# Patient Record
Sex: MALE | Race: WHITE | NOT HISPANIC OR LATINO | Employment: FULL TIME | URBAN - METROPOLITAN AREA
[De-identification: names, ages, dates, MRNs, and addresses within clinical notes are randomized per-mention and may not be internally consistent; named-entity substitution may affect disease eponyms.]

---

## 2019-01-07 ENCOUNTER — HOSPITAL ENCOUNTER (OUTPATIENT)
Dept: URGENT CARE | Facility: CLINIC | Age: 69
Discharge: HOME | End: 2019-01-07
Attending: FAMILY MEDICINE | Admitting: FAMILY MEDICINE

## 2019-09-01 ENCOUNTER — HOSPITAL ENCOUNTER (EMERGENCY)
Facility: HOSPITAL | Age: 69
Discharge: HOME | End: 2019-09-01
Admitting: EMERGENCY MEDICINE

## 2019-09-01 ENCOUNTER — APPOINTMENT (OUTPATIENT)
Dept: CT IMAGING | Facility: HOSPITAL | Age: 69
End: 2019-09-01

## 2019-09-01 VITALS
OXYGEN SATURATION: 98 % | HEIGHT: 75 IN | TEMPERATURE: 97.9 F | BODY MASS INDEX: 29.22 KG/M2 | WEIGHT: 235 LBS | HEART RATE: 70 BPM | DIASTOLIC BLOOD PRESSURE: 82 MMHG | SYSTOLIC BLOOD PRESSURE: 152 MMHG | RESPIRATION RATE: 18 BRPM

## 2019-09-01 DIAGNOSIS — K40.90 UNILATERAL INGUINAL HERNIA WITHOUT OBSTRUCTION OR GANGRENE, RECURRENCE NOT SPECIFIED: Primary | ICD-10-CM

## 2019-09-01 LAB
ALBUMIN SERPL-MCNC: 4.2 G/DL (ref 3.5–4.8)
ALBUMIN/GLOB SERPL: 1.6 G/DL (ref 1–1.7)
ALP SERPL-CCNC: 64 U/L (ref 32–91)
ALT SERPL W P-5'-P-CCNC: 19 U/L (ref 17–63)
ANION GAP SERPL CALCULATED.3IONS-SCNC: 15.8 MMOL/L (ref 5–15)
AST SERPL-CCNC: 23 U/L (ref 15–41)
BASOPHILS # BLD AUTO: 0.1 10*3/MM3 (ref 0–0.2)
BASOPHILS NFR BLD AUTO: 1.3 % (ref 0–1.5)
BILIRUB SERPL-MCNC: 0.9 MG/DL (ref 0.3–1.2)
BILIRUB UR QL STRIP: NEGATIVE
BUN BLD-MCNC: 12 MG/DL (ref 8–20)
BUN/CREAT SERPL: 12 (ref 6.2–20.3)
CALCIUM SPEC-SCNC: 8.8 MG/DL (ref 8.9–10.3)
CHLORIDE SERPL-SCNC: 103 MMOL/L (ref 101–111)
CLARITY UR: CLEAR
CO2 SERPL-SCNC: 23 MMOL/L (ref 22–32)
COLOR UR: YELLOW
CREAT BLD-MCNC: 1 MG/DL (ref 0.7–1.2)
DEPRECATED RDW RBC AUTO: 45.1 FL (ref 37–54)
EOSINOPHIL # BLD AUTO: 0.1 10*3/MM3 (ref 0–0.4)
EOSINOPHIL NFR BLD AUTO: 2.1 % (ref 0.3–6.2)
ERYTHROCYTE [DISTWIDTH] IN BLOOD BY AUTOMATED COUNT: 14.1 % (ref 12.3–15.4)
GFR SERPL CREATININE-BSD FRML MDRD: 74 ML/MIN/1.73
GLOBULIN UR ELPH-MCNC: 2.6 GM/DL (ref 2.5–3.8)
GLUCOSE BLD-MCNC: 106 MG/DL (ref 65–99)
GLUCOSE UR STRIP-MCNC: NEGATIVE MG/DL
HCT VFR BLD AUTO: 44.4 % (ref 37.5–51)
HGB BLD-MCNC: 15.3 G/DL (ref 13–17.7)
HGB UR QL STRIP.AUTO: NEGATIVE
HOLD SPECIMEN: NORMAL
HOLD SPECIMEN: NORMAL
KETONES UR QL STRIP: NEGATIVE
LEUKOCYTE ESTERASE UR QL STRIP.AUTO: NEGATIVE
LIPASE SERPL-CCNC: 31 U/L (ref 22–51)
LYMPHOCYTES # BLD AUTO: 1.5 10*3/MM3 (ref 0.7–3.1)
LYMPHOCYTES NFR BLD AUTO: 22.1 % (ref 19.6–45.3)
MCH RBC QN AUTO: 31.1 PG (ref 26.6–33)
MCHC RBC AUTO-ENTMCNC: 34.4 G/DL (ref 31.5–35.7)
MCV RBC AUTO: 90.6 FL (ref 79–97)
MONOCYTES # BLD AUTO: 0.5 10*3/MM3 (ref 0.1–0.9)
MONOCYTES NFR BLD AUTO: 7.6 % (ref 5–12)
NEUTROPHILS # BLD AUTO: 4.5 10*3/MM3 (ref 1.7–7)
NEUTROPHILS NFR BLD AUTO: 66.9 % (ref 42.7–76)
NITRITE UR QL STRIP: NEGATIVE
NRBC BLD AUTO-RTO: 0.1 /100 WBC (ref 0–0.2)
PH UR STRIP.AUTO: 7 [PH] (ref 5–8)
PLATELET # BLD AUTO: 194 10*3/MM3 (ref 140–450)
PMV BLD AUTO: 7.6 FL (ref 6–12)
POTASSIUM BLD-SCNC: 3.8 MMOL/L (ref 3.6–5.1)
PROT SERPL-MCNC: 6.8 G/DL (ref 6.1–7.9)
PROT UR QL STRIP: NEGATIVE
RBC # BLD AUTO: 4.91 10*6/MM3 (ref 4.14–5.8)
SODIUM BLD-SCNC: 138 MMOL/L (ref 136–144)
SP GR UR STRIP: 1.03 (ref 1–1.03)
UROBILINOGEN UR QL STRIP: NORMAL
WBC NRBC COR # BLD: 6.7 10*3/MM3 (ref 3.4–10.8)
WHOLE BLOOD HOLD SPECIMEN: NORMAL
WHOLE BLOOD HOLD SPECIMEN: NORMAL

## 2019-09-01 PROCEDURE — 85025 COMPLETE CBC W/AUTO DIFF WBC: CPT | Performed by: NURSE PRACTITIONER

## 2019-09-01 PROCEDURE — 74177 CT ABD & PELVIS W/CONTRAST: CPT

## 2019-09-01 PROCEDURE — 83690 ASSAY OF LIPASE: CPT | Performed by: NURSE PRACTITIONER

## 2019-09-01 PROCEDURE — 81003 URINALYSIS AUTO W/O SCOPE: CPT | Performed by: NURSE PRACTITIONER

## 2019-09-01 PROCEDURE — 80053 COMPREHEN METABOLIC PANEL: CPT | Performed by: NURSE PRACTITIONER

## 2019-09-01 PROCEDURE — 0 IOPAMIDOL PER 1 ML: Performed by: NURSE PRACTITIONER

## 2019-09-01 PROCEDURE — 99283 EMERGENCY DEPT VISIT LOW MDM: CPT

## 2019-09-01 RX ORDER — SODIUM CHLORIDE 0.9 % (FLUSH) 0.9 %
10 SYRINGE (ML) INJECTION AS NEEDED
Status: DISCONTINUED | OUTPATIENT
Start: 2019-09-01 | End: 2019-09-01 | Stop reason: HOSPADM

## 2019-09-01 RX ADMIN — IOPAMIDOL 100 ML: 755 INJECTION, SOLUTION INTRAVENOUS at 15:38

## 2019-09-01 NOTE — ED PROVIDER NOTES
"Keanu   Is a 69-year-old male that states he woke and noticed a \"bulge\" in his right lower quadrant.  He states it is not painful.  He states that he called Dr. Curtis's office and was sent here for evaluation.  He has no pain no nausea no vomiting no fever no chills.            Review of Systems   Constitutional: Negative for chills, fatigue and fever.   HENT: Negative for congestion, tinnitus and trouble swallowing.    Eyes: Negative for photophobia, discharge and redness.   Respiratory: Negative for cough and shortness of breath.    Cardiovascular: Negative for chest pain and palpitations.   Gastrointestinal: Negative for abdominal pain, diarrhea, nausea and vomiting.        Right lower quadrant abdominal wall bulge   Genitourinary: Negative for dysuria, frequency and urgency.   Musculoskeletal: Negative for back pain, joint swelling and myalgias.   Skin: Negative for rash.   Neurological: Negative for dizziness and headaches.   Psychiatric/Behavioral: Negative for confusion.   All other systems reviewed and are negative.      Past Medical History:   Diagnosis Date   • Vertigo        Allergies   Allergen Reactions   • Codeine Paresthesia       Past Surgical History:   Procedure Laterality Date   • ABDOMINAL SURGERY     • COLON SURGERY     • HERNIA REPAIR         History reviewed. No pertinent family history.    Social History     Socioeconomic History   • Marital status:      Spouse name: Not on file   • Number of children: Not on file   • Years of education: Not on file   • Highest education level: Not on file   Tobacco Use   • Smoking status: Former Smoker     Last attempt to quit: 2009     Years since quitting: 10.6   • Smokeless tobacco: Never Used   Substance and Sexual Activity   • Alcohol use: No     Frequency: Never   • Drug use: No           Objective   Physical Exam   Constitutional: He is oriented to person, place, and time. He appears well-developed and well-nourished.   HENT:   Head: " Normocephalic and atraumatic.   Eyes: Conjunctivae and EOM are normal. Pupils are equal, round, and reactive to light.   Neck: Normal range of motion. Neck supple.   Cardiovascular: Normal rate, regular rhythm, normal heart sounds and intact distal pulses.   Pulmonary/Chest: Effort normal and breath sounds normal.   Abdominal: Soft. Bowel sounds are normal.       Musculoskeletal: Normal range of motion.   Neurological: He is alert and oriented to person, place, and time. GCS eye subscore is 4. GCS verbal subscore is 5. GCS motor subscore is 6.   Skin: Skin is warm and dry.   Psychiatric: He has a normal mood and affect.   Vitals reviewed.      Procedures           ED Course  ED Course as of Sep 01 1622   Sun Sep 01, 2019   1619 Dr. Perry happened to be in the emergency room and the case was discussed with her and she reviewed the CAT scan and the patient will be discharged home to follow-up in the office  [KW]      ED Course User Index  [KW] Hattie Ramirez, MALENA      Labs Reviewed   COMPREHENSIVE METABOLIC PANEL - Abnormal; Notable for the following components:       Result Value    Glucose 106 (*)     Calcium 8.8 (*)     Anion Gap 15.8 (*)     All other components within normal limits    Narrative:     The MDRD GFR formula is only valid for adults with stable renal function between ages 18 and 70.   LIPASE - Normal   URINALYSIS W/ MICROSCOPIC IF INDICATED (NO CULTURE) - Normal    Narrative:     Urine microscopic not indicated.   CBC WITH AUTO DIFFERENTIAL - Normal   RAINBOW DRAW    Narrative:     The following orders were created for panel order [place] Draw.  Procedure                               Abnormality         Status                     ---------                               -----------         ------                     Light Blue Top[[id number]]                                   Final result               Green Top (Gel)[[id number]]                                  Final result                Lavender Top[[id number]]                                     Final result               Gold Top - SST[[id number]]                                   Final result                 Please view results for these tests on the individual orders.   CBC AND DIFFERENTIAL    Narrative:     The following orders were created for panel order CBC & Differential.  Procedure                               Abnormality         Status                     ---------                               -----------         ------                     CBC Auto Differential[[id number]]        Normal              Final result                 Please view results for these tests on the individual orders.   LIGHT BLUE TOP   GREEN TOP   LAVENDER TOP   GOLD TOP - SST     Medications   sodium chloride 0.9 % flush 10 mL (not administered)   iopamidol (ISOVUE-370) 76 % injection 100 mL (100 mL Intravenous Given 9/1/19 1538)     Ct Abdomen Pelvis With Contrast    Result Date: 9/1/2019  1. 1. Patient reports bulging in the right side today. CT shows no corresponding finding. 2. There is a small fat-containing left inguinal hernia. 3. There is a fluid-filled loop of bowel that measures 4 cm in the right pelvis, which appears to be a distended segment of ileum. There are surgical sutures around this, in the appearance may be due to prior surgery. There are also surgical sutures around the sigmoid colon. 4. Small adrenal nodules are statistically most likely adenomas. 5. Incidental 4findings as reported above.  Electronically Signed By-Yusra Del Angel On:9/1/2019 4:10 PM This report was finalized on [id number] by  Yusra Del Angel .                [facility]      Final diagnoses:   Unilateral inguinal hernia without obstruction or gangrene, recurrence not specified            Hattie Ramirez, APRN  09/01/19 [id number]

## 2019-09-01 NOTE — DISCHARGE INSTRUCTIONS
Follow-up with Dr. Perry in the office for further evaluation treatment    Return for increased pain nausea vomiting fever chills or worsening symptoms

## 2019-11-01 ENCOUNTER — TRANSCRIBE ORDERS (OUTPATIENT)
Dept: ADMINISTRATIVE | Facility: HOSPITAL | Age: 69
End: 2019-11-01

## 2019-11-01 DIAGNOSIS — R42 DIZZINESS AND GIDDINESS: Primary | ICD-10-CM

## 2019-11-06 ENCOUNTER — HOSPITAL ENCOUNTER (OUTPATIENT)
Dept: RESPIRATORY THERAPY | Facility: HOSPITAL | Age: 69
Discharge: HOME | End: 2019-11-06
Admitting: INTERNAL MEDICINE

## 2019-11-06 DIAGNOSIS — R42 DIZZINESS AND GIDDINESS: ICD-10-CM

## 2019-11-06 LAB
BH CV STRESS BP STAGE 1: NORMAL
BH CV STRESS BP STAGE 3: NORMAL
BH CV STRESS DURATION MIN STAGE 1: 3
BH CV STRESS DURATION MIN STAGE 2: 2
BH CV STRESS DURATION MIN STAGE 3: 2
BH CV STRESS DURATION SEC STAGE 1: 0
BH CV STRESS DURATION SEC STAGE 2: 0
BH CV STRESS DURATION SEC STAGE 3: 0
BH CV STRESS GRADE STAGE 1: 10
BH CV STRESS GRADE STAGE 2: 12
BH CV STRESS GRADE STAGE 3: 14
BH CV STRESS HR STAGE 1: 127
BH CV STRESS HR STAGE 2: 139
BH CV STRESS HR STAGE 3: 162
BH CV STRESS METS STAGE 1: 5
BH CV STRESS METS STAGE 2: 7.5
BH CV STRESS METS STAGE 3: 10
BH CV STRESS PROTOCOL 1: NORMAL
BH CV STRESS RECOVERY BP: NORMAL MMHG
BH CV STRESS RECOVERY HR: 112 BPM
BH CV STRESS SPEED STAGE 1: 1.7
BH CV STRESS SPEED STAGE 2: 2.5
BH CV STRESS SPEED STAGE 3: 3.4
BH CV STRESS STAGE 1: 1
BH CV STRESS STAGE 2: 2
BH CV STRESS STAGE 3: 3
MAXIMAL PREDICTED HEART RATE: 149 BPM
PERCENT MAX PREDICTED HR: 108.72 %
STRESS BASELINE BP: NORMAL MMHG
STRESS BASELINE HR: 106 BPM
STRESS PERCENT HR: 128 %
STRESS POST ESTIMATED WORKLOAD: 10.1 METS
STRESS POST EXERCISE DUR MIN: 7 MIN
STRESS POST EXERCISE DUR SEC: 0 SEC
STRESS POST PEAK BP: NORMAL MMHG
STRESS POST PEAK HR: 162 BPM
STRESS TARGET HR: 127 BPM

## 2019-11-06 PROCEDURE — 93017 CV STRESS TEST TRACING ONLY: CPT

## 2019-11-06 PROCEDURE — 93016 CV STRESS TEST SUPVJ ONLY: CPT | Performed by: INTERNAL MEDICINE

## 2019-11-06 PROCEDURE — 93018 CV STRESS TEST I&R ONLY: CPT | Performed by: INTERNAL MEDICINE

## 2020-03-05 ENCOUNTER — APPOINTMENT (OUTPATIENT)
Dept: GENERAL RADIOLOGY | Facility: HOSPITAL | Age: 70
End: 2020-03-05

## 2020-03-05 ENCOUNTER — HOSPITAL ENCOUNTER (OUTPATIENT)
Facility: HOSPITAL | Age: 70
Setting detail: OBSERVATION
Discharge: HOME | End: 2020-03-06
Attending: EMERGENCY MEDICINE | Admitting: HOSPITALIST

## 2020-03-05 DIAGNOSIS — I48.91 ATRIAL FIBRILLATION WITH RAPID VENTRICULAR RESPONSE (HCC): Primary | ICD-10-CM

## 2020-03-05 PROBLEM — E03.9 HYPOTHYROIDISM (ACQUIRED): Status: ACTIVE | Noted: 2020-03-05

## 2020-03-05 PROBLEM — I10 ESSENTIAL HYPERTENSION: Chronic | Status: ACTIVE | Noted: 2020-03-05

## 2020-03-05 LAB
ANION GAP SERPL CALCULATED.3IONS-SCNC: 16 MMOL/L (ref 5–15)
BASOPHILS # BLD AUTO: 0.1 10*3/MM3 (ref 0–0.2)
BASOPHILS NFR BLD AUTO: 0.8 % (ref 0–1.5)
BUN BLD-MCNC: 11 MG/DL (ref 8–23)
BUN/CREAT SERPL: 11.5 (ref 7–25)
CALCIUM SPEC-SCNC: 9.6 MG/DL (ref 8.6–10.5)
CHLORIDE SERPL-SCNC: 105 MMOL/L (ref 98–107)
CO2 SERPL-SCNC: 22 MMOL/L (ref 22–29)
CREAT BLD-MCNC: 0.96 MG/DL (ref 0.76–1.27)
DEPRECATED RDW RBC AUTO: 42.4 FL (ref 37–54)
EOSINOPHIL # BLD AUTO: 0.1 10*3/MM3 (ref 0–0.4)
EOSINOPHIL NFR BLD AUTO: 1.1 % (ref 0.3–6.2)
ERYTHROCYTE [DISTWIDTH] IN BLOOD BY AUTOMATED COUNT: 13.5 % (ref 12.3–15.4)
GFR SERPL CREATININE-BSD FRML MDRD: 77 ML/MIN/1.73
GLUCOSE BLD-MCNC: 151 MG/DL (ref 65–99)
GLUCOSE BLDC GLUCOMTR-MCNC: 113 MG/DL (ref 70–105)
HCT VFR BLD AUTO: 51.2 % (ref 37.5–51)
HGB BLD-MCNC: 17.6 G/DL (ref 13–17.7)
HOLD SPECIMEN: NORMAL
HOLD SPECIMEN: NORMAL
LYMPHOCYTES # BLD AUTO: 1.5 10*3/MM3 (ref 0.7–3.1)
LYMPHOCYTES NFR BLD AUTO: 16.8 % (ref 19.6–45.3)
MCH RBC QN AUTO: 31.2 PG (ref 26.6–33)
MCHC RBC AUTO-ENTMCNC: 34.3 G/DL (ref 31.5–35.7)
MCV RBC AUTO: 90.9 FL (ref 79–97)
MONOCYTES # BLD AUTO: 0.7 10*3/MM3 (ref 0.1–0.9)
MONOCYTES NFR BLD AUTO: 7.4 % (ref 5–12)
NEUTROPHILS # BLD AUTO: 6.7 10*3/MM3 (ref 1.7–7)
NEUTROPHILS NFR BLD AUTO: 73.9 % (ref 42.7–76)
NRBC BLD AUTO-RTO: 0.1 /100 WBC (ref 0–0.2)
PLATELET # BLD AUTO: 246 10*3/MM3 (ref 140–450)
PMV BLD AUTO: 8.5 FL (ref 6–12)
POTASSIUM BLD-SCNC: 3.6 MMOL/L (ref 3.5–5.2)
RBC # BLD AUTO: 5.63 10*6/MM3 (ref 4.14–5.8)
SODIUM BLD-SCNC: 143 MMOL/L (ref 136–145)
T4 FREE SERPL-MCNC: 1.31 NG/DL (ref 0.93–1.7)
TROPONIN T SERPL-MCNC: <0.01 NG/ML (ref 0–0.03)
TSH SERPL DL<=0.05 MIU/L-ACNC: 4.67 UIU/ML (ref 0.27–4.2)
WBC NRBC COR # BLD: 9.1 10*3/MM3 (ref 3.4–10.8)
WHOLE BLOOD HOLD SPECIMEN: NORMAL
WHOLE BLOOD HOLD SPECIMEN: NORMAL

## 2020-03-05 PROCEDURE — 93005 ELECTROCARDIOGRAM TRACING: CPT

## 2020-03-05 PROCEDURE — 84439 ASSAY OF FREE THYROXINE: CPT | Performed by: EMERGENCY MEDICINE

## 2020-03-05 PROCEDURE — 96365 THER/PROPH/DIAG IV INF INIT: CPT

## 2020-03-05 PROCEDURE — 99285 EMERGENCY DEPT VISIT HI MDM: CPT

## 2020-03-05 PROCEDURE — 25010000002 ENOXAPARIN PER 10 MG: Performed by: EMERGENCY MEDICINE

## 2020-03-05 PROCEDURE — 99219 PR INITIAL OBSERVATION CARE/DAY 50 MINUTES: CPT | Performed by: PHYSICIAN ASSISTANT

## 2020-03-05 PROCEDURE — 93005 ELECTROCARDIOGRAM TRACING: CPT | Performed by: EMERGENCY MEDICINE

## 2020-03-05 PROCEDURE — G0378 HOSPITAL OBSERVATION PER HR: HCPCS

## 2020-03-05 PROCEDURE — 71045 X-RAY EXAM CHEST 1 VIEW: CPT

## 2020-03-05 PROCEDURE — 96366 THER/PROPH/DIAG IV INF ADDON: CPT

## 2020-03-05 PROCEDURE — 84484 ASSAY OF TROPONIN QUANT: CPT | Performed by: EMERGENCY MEDICINE

## 2020-03-05 PROCEDURE — 85025 COMPLETE CBC W/AUTO DIFF WBC: CPT | Performed by: EMERGENCY MEDICINE

## 2020-03-05 PROCEDURE — 80048 BASIC METABOLIC PNL TOTAL CA: CPT | Performed by: EMERGENCY MEDICINE

## 2020-03-05 PROCEDURE — 96376 TX/PRO/DX INJ SAME DRUG ADON: CPT

## 2020-03-05 PROCEDURE — 84443 ASSAY THYROID STIM HORMONE: CPT | Performed by: EMERGENCY MEDICINE

## 2020-03-05 PROCEDURE — 96372 THER/PROPH/DIAG INJ SC/IM: CPT

## 2020-03-05 PROCEDURE — 82962 GLUCOSE BLOOD TEST: CPT

## 2020-03-05 RX ORDER — ONDANSETRON 4 MG/1
4 TABLET, FILM COATED ORAL EVERY 6 HOURS PRN
Status: DISCONTINUED | OUTPATIENT
Start: 2020-03-05 | End: 2020-03-06 | Stop reason: HOSPADM

## 2020-03-05 RX ORDER — ONDANSETRON 2 MG/ML
4 INJECTION INTRAMUSCULAR; INTRAVENOUS EVERY 6 HOURS PRN
Status: DISCONTINUED | OUTPATIENT
Start: 2020-03-05 | End: 2020-03-06 | Stop reason: HOSPADM

## 2020-03-05 RX ORDER — CALCIUM GLUCONATE 20 MG/ML
2 INJECTION, SOLUTION INTRAVENOUS AS NEEDED
Status: DISCONTINUED | OUTPATIENT
Start: 2020-03-05 | End: 2020-03-06 | Stop reason: HOSPADM

## 2020-03-05 RX ORDER — MAGNESIUM SULFATE HEPTAHYDRATE 40 MG/ML
2 INJECTION, SOLUTION INTRAVENOUS AS NEEDED
Status: DISCONTINUED | OUTPATIENT
Start: 2020-03-05 | End: 2020-03-06 | Stop reason: HOSPADM

## 2020-03-05 RX ORDER — LEVOTHYROXINE SODIUM 0.03 MG/1
25 TABLET ORAL EVERY EVENING
COMMUNITY
End: 2020-11-01 | Stop reason: SDUPTHER

## 2020-03-05 RX ORDER — SODIUM CHLORIDE 0.9 % (FLUSH) 0.9 %
10 SYRINGE (ML) INJECTION EVERY 12 HOURS SCHEDULED
Status: DISCONTINUED | OUTPATIENT
Start: 2020-03-06 | End: 2020-03-06 | Stop reason: HOSPADM

## 2020-03-05 RX ORDER — NITROGLYCERIN 0.4 MG/1
0.4 TABLET SUBLINGUAL
Status: DISCONTINUED | OUTPATIENT
Start: 2020-03-05 | End: 2020-03-06 | Stop reason: HOSPADM

## 2020-03-05 RX ORDER — DOCUSATE SODIUM 100 MG/1
100 CAPSULE, LIQUID FILLED ORAL 2 TIMES DAILY PRN
Status: DISCONTINUED | OUTPATIENT
Start: 2020-03-05 | End: 2020-03-06 | Stop reason: HOSPADM

## 2020-03-05 RX ORDER — SODIUM CHLORIDE 0.9 % (FLUSH) 0.9 %
10 SYRINGE (ML) INJECTION AS NEEDED
Status: DISCONTINUED | OUTPATIENT
Start: 2020-03-05 | End: 2020-03-06 | Stop reason: HOSPADM

## 2020-03-05 RX ORDER — LISINOPRIL 5 MG/1
10 TABLET ORAL EVERY EVENING
Status: DISCONTINUED | OUTPATIENT
Start: 2020-03-06 | End: 2020-03-06

## 2020-03-05 RX ORDER — ACETAMINOPHEN 325 MG/1
650 TABLET ORAL EVERY 4 HOURS PRN
Status: DISCONTINUED | OUTPATIENT
Start: 2020-03-05 | End: 2020-03-06 | Stop reason: HOSPADM

## 2020-03-05 RX ORDER — KETOROLAC TROMETHAMINE 15 MG/ML
15 INJECTION, SOLUTION INTRAMUSCULAR; INTRAVENOUS EVERY 6 HOURS PRN
Status: DISCONTINUED | OUTPATIENT
Start: 2020-03-05 | End: 2020-03-06 | Stop reason: HOSPADM

## 2020-03-05 RX ORDER — CALCIUM GLUCONATE 20 MG/ML
1 INJECTION, SOLUTION INTRAVENOUS AS NEEDED
Status: DISCONTINUED | OUTPATIENT
Start: 2020-03-05 | End: 2020-03-06 | Stop reason: HOSPADM

## 2020-03-05 RX ORDER — POTASSIUM CHLORIDE 20 MEQ/1
40 TABLET, EXTENDED RELEASE ORAL AS NEEDED
Status: DISCONTINUED | OUTPATIENT
Start: 2020-03-05 | End: 2020-03-06 | Stop reason: HOSPADM

## 2020-03-05 RX ORDER — DILTIAZEM HCL IN NACL,ISO-OSM 125 MG/125
5-15 PLASTIC BAG, INJECTION (ML) INTRAVENOUS CONTINUOUS
Status: DISCONTINUED | OUTPATIENT
Start: 2020-03-05 | End: 2020-03-06 | Stop reason: HOSPADM

## 2020-03-05 RX ORDER — POTASSIUM CHLORIDE 1.5 G/1.77G
40 POWDER, FOR SOLUTION ORAL AS NEEDED
Status: DISCONTINUED | OUTPATIENT
Start: 2020-03-05 | End: 2020-03-06 | Stop reason: HOSPADM

## 2020-03-05 RX ORDER — ACETAMINOPHEN 160 MG/5ML
650 SOLUTION ORAL EVERY 4 HOURS PRN
Status: DISCONTINUED | OUTPATIENT
Start: 2020-03-05 | End: 2020-03-06 | Stop reason: HOSPADM

## 2020-03-05 RX ORDER — LEVOTHYROXINE SODIUM 0.03 MG/1
25 TABLET ORAL EVERY EVENING
Status: DISCONTINUED | OUTPATIENT
Start: 2020-03-06 | End: 2020-03-06 | Stop reason: HOSPADM

## 2020-03-05 RX ORDER — CHOLECALCIFEROL (VITAMIN D3) 125 MCG
5 CAPSULE ORAL NIGHTLY PRN
Status: DISCONTINUED | OUTPATIENT
Start: 2020-03-05 | End: 2020-03-06 | Stop reason: HOSPADM

## 2020-03-05 RX ORDER — ACETAMINOPHEN 650 MG/1
650 SUPPOSITORY RECTAL EVERY 4 HOURS PRN
Status: DISCONTINUED | OUTPATIENT
Start: 2020-03-05 | End: 2020-03-06 | Stop reason: HOSPADM

## 2020-03-05 RX ORDER — MAGNESIUM SULFATE HEPTAHYDRATE 40 MG/ML
4 INJECTION, SOLUTION INTRAVENOUS AS NEEDED
Status: DISCONTINUED | OUTPATIENT
Start: 2020-03-05 | End: 2020-03-06 | Stop reason: HOSPADM

## 2020-03-05 RX ORDER — ALUMINA, MAGNESIA, AND SIMETHICONE 2400; 2400; 240 MG/30ML; MG/30ML; MG/30ML
15 SUSPENSION ORAL EVERY 6 HOURS PRN
Status: DISCONTINUED | OUTPATIENT
Start: 2020-03-05 | End: 2020-03-06 | Stop reason: HOSPADM

## 2020-03-05 RX ADMIN — ENOXAPARIN SODIUM 110 MG: 120 INJECTION SUBCUTANEOUS at 18:23

## 2020-03-05 RX ADMIN — SODIUM CHLORIDE 5 MG/HR: 900 INJECTION, SOLUTION INTRAVENOUS at 17:43

## 2020-03-05 RX ADMIN — SODIUM CHLORIDE 5 MG/HR: 900 INJECTION, SOLUTION INTRAVENOUS at 14:44

## 2020-03-05 RX ADMIN — SODIUM CHLORIDE 500 ML: 900 INJECTION, SOLUTION INTRAVENOUS at 17:46

## 2020-03-05 NOTE — H&P
"      [facility] Medicine Services      Patient Name: Filipe Altamirano  : 1950  MRN: [id number]  Primary Care Physician: Frantz Saleem MD  Date of admission: 3/5/2020    Patient Care Team:  Frantz Saleem MD as PCP - General (Internal Medicine)          Subjective   History Present Illness     Chief Complaint:   Chief Complaint   Patient presents with   • Palpitations       Mr. Altamirano is a 70 y.o. male with past medical history of hypertension and hypothyroidism who with past medical history of presents to [facility] complaining of palpitations.  Patient reports that over the past 10 days he has been experiencing symptoms of lightheadedness and \"feeling off\".  He notes that around 1400 hrs. on 3/5/2020 he began experience a sensation of tachycardia and palpitations without any specific pain.  Patient states he was not exerting himself at the time of onset.  He proceeded to the urgent care where he had an EKG which showed A. fib with RVR and was sent to the ED.  Patient denies any shortness of air, nausea, vomiting or recent fever.  He confirms compliance with all of his medications including his thyroid replacement therapy which he started in 2020.    In the ED patient found to have labs significant for a troponin of 0.010, remainder of CMP and CBC generally unremarkable.  TSH: 4.670 with free T4: 1.31.  Chest x-ray shows no acute cardiopulmonary abnormality.  EKG shows A. fib with RVR with no obvious ST changes.  Stress test from 2019 shows patient did report some dyspnea during the test however no evidence of myocardial ischemia is reported by EKG.         History of Present Illness    Review of Systems   Constitution: Negative.   HENT: Negative.    Eyes: Negative.    Cardiovascular: Positive for palpitations.   Respiratory: Negative.    Endocrine: Negative.    Skin: Negative.    Musculoskeletal: Negative.    Gastrointestinal: Negative.    Genitourinary: " Negative.    Neurological: Negative.    Psychiatric/Behavioral: Negative.            Personal History     Past Medical History:   Past Medical History:   Diagnosis Date   • Disease of thyroid gland    • Hyperlipidemia    • Hypertension    • Vertigo        Surgical History:      Past Surgical History:   Procedure Laterality Date   • ABDOMINAL SURGERY     • COLON SURGERY     • HERNIA REPAIR             Family History: family history is not on file. Otherwise pertinent FHx was reviewed and unremarkable.     Social History:  reports that he quit smoking about 11 years ago. He has never used smokeless tobacco. He reports that he does not drink alcohol or use drugs.      Medications:  Prior to Admission medications    Medication Sig Start Date End Date Taking? Authorizing Provider   levothyroxine (SYNTHROID, LEVOTHROID) 25 MCG tablet Take 25 mcg by mouth Every Evening.   Yes Provider, MD Austin   lisinopril (PRINIVIL,ZESTRIL) 10 MG tablet Take 10 mg by mouth Every Evening.   Yes Emergency, Nurse VANDANA Hatfield   levothyroxine (SYNTHROID, LEVOTHROID) 25 MCG tablet levothyroxine 25 mcg tablet   Take 1 tablet every day by oral route.   take at night, the same time, on an empty stomach with a full 8oz glass of water  3/5/20  Emergency, Nurse Liu RN       Allergies:    Allergies   Allergen Reactions   • Codeine Paresthesia       Objective   Objective     Vital Signs  Temp:  [97.8 °F (36.6 °C)-98.4 °F (36.9 °C)] 98.4 °F (36.9 °C)  Heart Rate:  [] 110  Resp:  [18-20] 18  BP: (101-160)/(60-84) 115/60  SpO2:  [93 %-98 %] 97 %  on   ;      Body mass index is 28.87 kg/m².    Physical Exam   Constitutional: He is oriented to person, place, and time. He appears well-developed and well-nourished. No distress.   HENT:   Head: Normocephalic and atraumatic.   Right Ear: External ear normal.   Left Ear: External ear normal.   Nose: Nose normal.   Eyes: Conjunctivae and EOM are normal.   Neck: Normal range of motion. No JVD present.     Cardiovascular: Normal rate, normal heart sounds and intact distal pulses.   Pulmonary/Chest: Effort normal and breath sounds normal.   Abdominal: Soft. Bowel sounds are normal.   Musculoskeletal: Normal range of motion.   Neurological: He is alert and oriented to person, place, and time.   Skin: Skin is warm and dry.   Psychiatric: He has a normal mood and affect. His behavior is normal. Judgment and thought content normal.       Results Review:  I have personally reviewed most recent cardiac tracings, lab results and radiology images and interpretations and agree with findings, most notably: Troponin, TSH, chest x-ray and EKG.    Results from last 7 days   Lab Units 03/05/20  1550   WBC 10*3/mm3 9.10   HEMOGLOBIN g/dL 17.6   HEMATOCRIT % 51.2*   PLATELETS 10*3/mm3 246     Results from last 7 days   Lab Units 03/05/20  1550   SODIUM mmol/L 143   POTASSIUM mmol/L 3.6   CHLORIDE mmol/L 105   CO2 mmol/L 22.0   BUN mg/dL 11   CREATININE mg/dL 0.96   GLUCOSE mg/dL 151*   CALCIUM mg/dL 9.6   TROPONIN T ng/mL <0.010     Estimated Creatinine Clearance: 91.2 mL/min (by C-G formula based on SCr of 0.96 mg/dL).  Brief Urine Lab Results  (Last result in the past 365 days)      Color   Clarity   Blood   Leuk Est   Nitrite   Protein   CREAT   Urine HCG        09/01/19 1458 Yellow Clear Negative Negative Negative Negative               Microbiology Results (last 10 days)     ** No results found for the last 240 hours. **          ECG/EMG Results (most recent)     Procedure Component Value Units Date/Time    ECG 12 Lead [[id number]] Collected:  03/05/20 1531     Updated:  03/05/20 1533    Narrative:       HEART RATE= 142  bpm  RR Interval= 423  ms  NJ Interval=   ms  P Horizontal Axis=   deg  P Front Axis=   deg  QRSD Interval= 100  ms  QT Interval= 319  ms  QRS Axis= 66  deg  T Wave Axis= -50  deg  - ABNORMAL ECG -  Atrial fibrillation  Repolarization abnormality, prob rate related  No previous ECG available for  comparison  Electronically Signed By:   Date and Time of Study: 2020-03-05 15:31:42                    Xr Chest 1 View    Result Date: 3/5/2020  No acute cardiopulmonary abnormality.  Electronically Signed By-Krishan Ritchie On:3/5/2020 5:50 PM This report was finalized on [id number] by  Krishan Ritchie, .        Estimated Creatinine Clearance: 91.2 mL/min (by C-G formula based on SCr of 0.96 mg/dL).    Assessment/Plan   Assessment/Plan       Active Hospital Problems    Diagnosis  POA   • **Atrial fibrillation with rapid ventricular response (CMS/HCC) [I48.91]  Yes     Priority: High   • Essential hypertension [I10]  Yes     Priority: Medium   • Hypothyroidism (acquired) [E03.9]  Yes     Priority: Low      Resolved Hospital Problems   No resolved problems to display.     1.  A. fib with RVR  -EKG shows A. fib with RVR with no obvious ST changes.   -Chest x-ray shows no acute cardiopulmonary changes.  -Cardizem started in the ED with improvement of rate to between , continue  -TSH: 4.670  -Check INR and PT  -Lovenox ordered in the ED pharmacy to dose, continue  -Continue cardiac monitoring  -Cardiology consulted    2.  Hypertension  -Well controlled with a blood pressure on admission of 134/78  - Continue Lisinopril  - Monitor while admittied    3.  Hypothyroidism  -TSH: 4.670, free T4: 1.31  -Continue levothyroxine            VTE Prophylaxis -full dose Lovenox  Mechanical Order History:     None      Pharmalogical Order History:     Ordered     Dose Route Frequency Stop    03/05/20 1800  enoxaparin (LOVENOX) syringe 110 mg      1 mg/kg SC Once 03/05/20 1823          CODE STATUS: Full  Code Status and Medical Interventions:   Ordered at: 03/05/20 1920     Level Of Support Discussed With:    Patient     Code Status:    CPR     Medical Interventions (Level of Support Prior to Arrest):    Full       Admission Status:  I believe this patient meets observation criteria.      I discussed the patient's findings  and my recommendations with patient.        Electronically signed by Emilio Basilio PA-C, 03/05/20, 6:55 PM.  [facility] Hospitalist Team

## 2020-03-05 NOTE — ED NOTES
Pt HR has improved, BP has dropped but within normal limits     Kathi Godfrey, RN  03/05/20 5568

## 2020-03-05 NOTE — ED PROVIDER NOTES
Subjective   History of Present Illness  Palpitations  70-year-old male complains of heart racing palpitations over the last 2 hours.  He states he felt slightly lightheaded off and on over last few days.  He denies chest pain or shortness of breath.  He reports no relieving or exacerbating factors.  States the palpitations came on at rest.  Review of Systems   Constitutional: Negative.    HENT: Negative.    Eyes: Negative.    Respiratory: Negative.    Cardiovascular: Positive for palpitations.   Gastrointestinal: Negative.    Genitourinary: Negative.    Musculoskeletal: Negative.    Skin: Negative.    Neurological: Negative.    Hematological: Negative.    Psychiatric/Behavioral: Negative.        Past Medical History:   Diagnosis Date   • Disease of thyroid gland    • Hyperlipidemia    • Hypertension    • Vertigo        Allergies   Allergen Reactions   • Codeine Paresthesia       Past Surgical History:   Procedure Laterality Date   • ABDOMINAL SURGERY     • COLON SURGERY     • HERNIA REPAIR         No family history on file.    Social History     Socioeconomic History   • Marital status:      Spouse name: Not on file   • Number of children: Not on file   • Years of education: Not on file   • Highest education level: Not on file   Tobacco Use   • Smoking status: Former Smoker     Last attempt to quit:      Years since quittin.1   • Smokeless tobacco: Never Used   Substance and Sexual Activity   • Alcohol use: No     Frequency: Never   • Drug use: No     Prior to Admission medications    Medication Sig Start Date End Date Taking? Authorizing Provider   levothyroxine (SYNTHROID, LEVOTHROID) 25 MCG tablet levothyroxine 25 mcg tablet   Take 1 tablet every day by oral route.   take at night, the same time, on an empty stomach with a full 8oz glass of water    Emergency, Nurse Liu, RN   lisinopril (PRINIVIL,ZESTRIL) 10 MG tablet lisinopril 10 mg tablet   TAKE ONE TABLET BY MOUTH DAILY    Emergency, Nurse  "Epic, RN     /69   Pulse 112   Temp 98.4 °F (36.9 °C) (Oral)   Resp 18   Ht 190.5 cm (75\")   Wt 105 kg (231 lb)   SpO2 93%   BMI 28.87 kg/m²       Objective   Physical Exam  General: Well-developed well-appearing, no acute distress, alert and appropriate  Eyes: Pupils round , sclera nonicteric  HEENT: Mucous membranes moist, no mucosal swelling  Neck: Supple, no nuchal rigidity, no lymphadenopathy  Respirations: Respirations nonlabored, equal breath sounds bilaterally, clear lungs  Heart rapid rate, irregular rhythm, no murmurs rubs or gallops,   Abdomen soft nontender nondistended, no hepatosplenomegaly, no hernia,   Extremities no clubbing cyanosis or edema, calves are symmetric and nontender  Neuro cranial nerves grossly intact, no focal limb deficits  Psych oriented, pleasant affect  Skin no rash, brisk cap refill  Procedures           ED Course      My EKG interpretation, atrial fibrillation with rapid ventricular response rate 142           Results for orders placed or performed during the hospital encounter of 03/05/20   Basic Metabolic Panel   Result Value Ref Range    Glucose 151 (H) 65 - 99 mg/dL    BUN 11 8 - 23 mg/dL    Creatinine 0.96 0.76 - 1.27 mg/dL    Sodium 143 136 - 145 mmol/L    Potassium 3.6 3.5 - 5.2 mmol/L    Chloride 105 98 - 107 mmol/L    CO2 22.0 22.0 - 29.0 mmol/L    Calcium 9.6 8.6 - 10.5 mg/dL    eGFR Non African Amer 77 >60 mL/min/1.73    BUN/Creatinine Ratio 11.5 7.0 - 25.0    Anion Gap 16.0 (H) 5.0 - 15.0 mmol/L   Troponin   Result Value Ref Range    Troponin T <0.010 0.000 - 0.030 ng/mL   TSH   Result Value Ref Range    TSH 4.670 (H) 0.270 - 4.200 uIU/mL   T4, Free   Result Value Ref Range    Free T4 1.31 0.93 - 1.70 ng/dL   CBC Auto Differential   Result Value Ref Range    WBC 9.10 3.40 - 10.80 10*3/mm3    RBC 5.63 4.14 - 5.80 10*6/mm3    Hemoglobin 17.6 13.0 - 17.7 g/dL    Hematocrit 51.2 (H) 37.5 - 51.0 %    MCV 90.9 79.0 - 97.0 fL    MCH 31.2 26.6 - 33.0 pg    MCHC " 34.3 31.5 - 35.7 g/dL    RDW 13.5 12.3 - 15.4 %    RDW-SD 42.4 37.0 - 54.0 fl    MPV 8.5 6.0 - 12.0 fL    Platelets 246 140 - 450 10*3/mm3    Neutrophil % 73.9 42.7 - 76.0 %    Lymphocyte % 16.8 (L) 19.6 - 45.3 %    Monocyte % 7.4 5.0 - 12.0 %    Eosinophil % 1.1 0.3 - 6.2 %    Basophil % 0.8 0.0 - 1.5 %    Neutrophils, Absolute 6.70 1.70 - 7.00 10*3/mm3    Lymphocytes, Absolute 1.50 0.70 - 3.10 10*3/mm3    Monocytes, Absolute 0.70 0.10 - 0.90 10*3/mm3    Eosinophils, Absolute 0.10 0.00 - 0.40 10*3/mm3    Basophils, Absolute 0.10 0.00 - 0.20 10*3/mm3    nRBC 0.1 0.0 - 0.2 /100 WBC   Light Blue Top   Result Value Ref Range    Extra Tube hold for add-on    Green Top (Gel)   Result Value Ref Range    Extra Tube Hold for add-ons.    Lavender Top   Result Value Ref Range    Extra Tube hold for add-on    Gold Top - SST   Result Value Ref Range    Extra Tube Hold for add-ons.      Xr Chest 1 View    Result Date: 3/5/2020  No acute cardiopulmonary abnormality.  Electronically Signed By-Krishan Ritchie On:3/5/2020 5:50 PM This report was finalized on [id number] by  Krishan Ritchie, .                                 MDM  Patient presents with palpitations that was found to be caused by atrial fibrillation with rapid ventricular response.  This is a new onset A. fib.  He was not having ischemic chest pain.  His troponin was normal.  No particular trigger was identified.  His TSH was mildly elevated he is on Synthroid.  Patient was started on Cardizem bolus and drip titrated to effect and his heart rate was improved.  He had persistent A. fib.  He was ordered subcu Lovenox.  Hospitalist service was paged and patient will be admitted for further care and evaluation.  Final diagnoses:   Atrial fibrillation with rapid ventricular response (CMS/HCC)            Finn Apodaca MD  03/05/20 5139

## 2020-03-06 VITALS
RESPIRATION RATE: 18 BRPM | BODY MASS INDEX: 29.25 KG/M2 | OXYGEN SATURATION: 97 % | HEART RATE: 67 BPM | DIASTOLIC BLOOD PRESSURE: 64 MMHG | TEMPERATURE: 98.1 F | WEIGHT: 235.23 LBS | HEIGHT: 75 IN | SYSTOLIC BLOOD PRESSURE: 115 MMHG

## 2020-03-06 LAB
ALBUMIN SERPL-MCNC: 4.2 G/DL (ref 3.5–5.2)
ALBUMIN/GLOB SERPL: 1.7 G/DL
ALP SERPL-CCNC: 65 U/L (ref 39–117)
ALT SERPL W P-5'-P-CCNC: 19 U/L (ref 1–41)
ANION GAP SERPL CALCULATED.3IONS-SCNC: 11 MMOL/L (ref 5–15)
AST SERPL-CCNC: 17 U/L (ref 1–40)
BASOPHILS # BLD AUTO: 0.1 10*3/MM3 (ref 0–0.2)
BASOPHILS NFR BLD AUTO: 1 % (ref 0–1.5)
BILIRUB SERPL-MCNC: 0.7 MG/DL (ref 0.2–1.2)
BUN BLD-MCNC: 12 MG/DL (ref 8–23)
BUN/CREAT SERPL: 11.9 (ref 7–25)
CALCIUM SPEC-SCNC: 9.4 MG/DL (ref 8.6–10.5)
CHLORIDE SERPL-SCNC: 107 MMOL/L (ref 98–107)
CHOLEST SERPL-MCNC: 187 MG/DL (ref 0–200)
CK SERPL-CCNC: 112 U/L (ref 20–200)
CO2 SERPL-SCNC: 26 MMOL/L (ref 22–29)
CREAT BLD-MCNC: 1.01 MG/DL (ref 0.76–1.27)
DEPRECATED RDW RBC AUTO: 44.2 FL (ref 37–54)
EOSINOPHIL # BLD AUTO: 0.2 10*3/MM3 (ref 0–0.4)
EOSINOPHIL NFR BLD AUTO: 2 % (ref 0.3–6.2)
ERYTHROCYTE [DISTWIDTH] IN BLOOD BY AUTOMATED COUNT: 13.9 % (ref 12.3–15.4)
GFR SERPL CREATININE-BSD FRML MDRD: 73 ML/MIN/1.73
GLOBULIN UR ELPH-MCNC: 2.5 GM/DL
GLUCOSE BLD-MCNC: 104 MG/DL (ref 65–99)
HCT VFR BLD AUTO: 47.3 % (ref 37.5–51)
HDLC SERPL-MCNC: 38 MG/DL (ref 40–60)
HGB BLD-MCNC: 16.2 G/DL (ref 13–17.7)
INR PPP: 1.08 (ref 0.9–1.1)
INR PPP: 1.11 (ref 0.9–1.1)
LDLC SERPL CALC-MCNC: 116 MG/DL (ref 0–100)
LDLC/HDLC SERPL: 3.06 {RATIO}
LYMPHOCYTES # BLD AUTO: 2.1 10*3/MM3 (ref 0.7–3.1)
LYMPHOCYTES NFR BLD AUTO: 26.5 % (ref 19.6–45.3)
MAGNESIUM SERPL-MCNC: 2.1 MG/DL (ref 1.6–2.4)
MCH RBC QN AUTO: 31.2 PG (ref 26.6–33)
MCHC RBC AUTO-ENTMCNC: 34.3 G/DL (ref 31.5–35.7)
MCV RBC AUTO: 91 FL (ref 79–97)
MONOCYTES # BLD AUTO: 0.7 10*3/MM3 (ref 0.1–0.9)
MONOCYTES NFR BLD AUTO: 9.1 % (ref 5–12)
NEUTROPHILS # BLD AUTO: 4.9 10*3/MM3 (ref 1.7–7)
NEUTROPHILS NFR BLD AUTO: 61.4 % (ref 42.7–76)
NRBC BLD AUTO-RTO: 0.1 /100 WBC (ref 0–0.2)
NT-PROBNP SERPL-MCNC: 211.5 PG/ML (ref 5–900)
PHOSPHATE SERPL-MCNC: 3.5 MG/DL (ref 2.5–4.5)
PLATELET # BLD AUTO: 226 10*3/MM3 (ref 140–450)
PMV BLD AUTO: 7.9 FL (ref 6–12)
POTASSIUM BLD-SCNC: 3.9 MMOL/L (ref 3.5–5.2)
POTASSIUM BLD-SCNC: 4.2 MMOL/L (ref 3.5–5.2)
PROT SERPL-MCNC: 6.7 G/DL (ref 6–8.5)
PROTHROMBIN TIME: 11.2 SECONDS (ref 9.6–11.7)
PROTHROMBIN TIME: 11.4 SECONDS (ref 9.6–11.7)
RBC # BLD AUTO: 5.2 10*6/MM3 (ref 4.14–5.8)
SODIUM BLD-SCNC: 144 MMOL/L (ref 136–145)
TRIGL SERPL-MCNC: 163 MG/DL (ref 0–150)
TROPONIN T SERPL-MCNC: <0.01 NG/ML (ref 0–0.03)
TROPONIN T SERPL-MCNC: <0.01 NG/ML (ref 0–0.03)
TSH SERPL DL<=0.05 MIU/L-ACNC: 3.82 UIU/ML (ref 0.27–4.2)
VLDLC SERPL-MCNC: 32.6 MG/DL
WBC NRBC COR # BLD: 8 10*3/MM3 (ref 3.4–10.8)

## 2020-03-06 PROCEDURE — 83735 ASSAY OF MAGNESIUM: CPT | Performed by: PHYSICIAN ASSISTANT

## 2020-03-06 PROCEDURE — 25010000002 INFLUENZA VAC SPLIT QUAD 0.5 ML SUSPENSION PREFILLED SYRINGE: Performed by: PHYSICIAN ASSISTANT

## 2020-03-06 PROCEDURE — 90686 IIV4 VACC NO PRSV 0.5 ML IM: CPT | Performed by: PHYSICIAN ASSISTANT

## 2020-03-06 PROCEDURE — 93005 ELECTROCARDIOGRAM TRACING: CPT | Performed by: HOSPITALIST

## 2020-03-06 PROCEDURE — 99203 OFFICE O/P NEW LOW 30 MIN: CPT | Performed by: INTERNAL MEDICINE

## 2020-03-06 PROCEDURE — 80053 COMPREHEN METABOLIC PANEL: CPT | Performed by: PHYSICIAN ASSISTANT

## 2020-03-06 PROCEDURE — 85610 PROTHROMBIN TIME: CPT | Performed by: PHYSICIAN ASSISTANT

## 2020-03-06 PROCEDURE — 84443 ASSAY THYROID STIM HORMONE: CPT | Performed by: PHYSICIAN ASSISTANT

## 2020-03-06 PROCEDURE — 83880 ASSAY OF NATRIURETIC PEPTIDE: CPT | Performed by: PHYSICIAN ASSISTANT

## 2020-03-06 PROCEDURE — G0008 ADMIN INFLUENZA VIRUS VAC: HCPCS | Performed by: PHYSICIAN ASSISTANT

## 2020-03-06 PROCEDURE — 84132 ASSAY OF SERUM POTASSIUM: CPT | Performed by: PHYSICIAN ASSISTANT

## 2020-03-06 PROCEDURE — 82550 ASSAY OF CK (CPK): CPT | Performed by: PHYSICIAN ASSISTANT

## 2020-03-06 PROCEDURE — 80061 LIPID PANEL: CPT | Performed by: PHYSICIAN ASSISTANT

## 2020-03-06 PROCEDURE — 84100 ASSAY OF PHOSPHORUS: CPT | Performed by: PHYSICIAN ASSISTANT

## 2020-03-06 PROCEDURE — 85025 COMPLETE CBC W/AUTO DIFF WBC: CPT | Performed by: PHYSICIAN ASSISTANT

## 2020-03-06 PROCEDURE — 99217 PR OBSERVATION CARE DISCHARGE MANAGEMENT: CPT | Performed by: HOSPITALIST

## 2020-03-06 PROCEDURE — G0378 HOSPITAL OBSERVATION PER HR: HCPCS

## 2020-03-06 PROCEDURE — 96372 THER/PROPH/DIAG INJ SC/IM: CPT

## 2020-03-06 PROCEDURE — 96366 THER/PROPH/DIAG IV INF ADDON: CPT

## 2020-03-06 PROCEDURE — 25010000002 ENOXAPARIN PER 10 MG: Performed by: PHYSICIAN ASSISTANT

## 2020-03-06 PROCEDURE — 84484 ASSAY OF TROPONIN QUANT: CPT | Performed by: PHYSICIAN ASSISTANT

## 2020-03-06 RX ORDER — LISINOPRIL 5 MG/1
5 TABLET ORAL EVERY EVENING
Qty: 30 TABLET | Refills: 2 | Status: SHIPPED | OUTPATIENT
Start: 2020-03-06 | End: 2020-07-22 | Stop reason: DRUGHIGH

## 2020-03-06 RX ORDER — WARFARIN SODIUM 5 MG/1
5 TABLET ORAL
Status: DISCONTINUED | OUTPATIENT
Start: 2020-03-06 | End: 2020-03-06 | Stop reason: HOSPADM

## 2020-03-06 RX ORDER — LISINOPRIL 5 MG/1
5 TABLET ORAL EVERY EVENING
Status: DISCONTINUED | OUTPATIENT
Start: 2020-03-06 | End: 2020-03-06 | Stop reason: HOSPADM

## 2020-03-06 RX ORDER — WARFARIN SODIUM 5 MG/1
TABLET ORAL
Qty: 20 TABLET | Refills: 0 | Status: SHIPPED | OUTPATIENT
Start: 2020-03-06 | End: 2020-08-06 | Stop reason: SDUPTHER

## 2020-03-06 RX ADMIN — Medication 10 ML: at 08:59

## 2020-03-06 RX ADMIN — ENOXAPARIN SODIUM 110 MG: 120 INJECTION SUBCUTANEOUS at 12:23

## 2020-03-06 RX ADMIN — INFLUENZA A VIRUS A/BRISBANE/02/2018 IVR-190 (H1N1) ANTIGEN (PROPIOLACTONE INACTIVATED), INFLUENZA A VIRUS A/KANSAS/14/2017 X-327 (H3N2) ANTIGEN (PROPIOLACTONE INACTIVATED), INFLUENZA B VIRUS B/MARYLAND/15/2016 ANTIGEN (PROPIOLACTONE INACTIVATED), INFLUENZA B VIRUS B/PHUKET/3073/2013 BVR-1B ANTIGEN (PROPIOLACTONE INACTIVATED) 0.5 ML: 15; 15; 15; 15 INJECTION, SUSPENSION INTRAMUSCULAR at 17:52

## 2020-03-06 NOTE — PROGRESS NOTES
"Pharmacy Consult - Lovenox    Filipe Altamirano has been consulted for pharmacy to dose enoxaparin for A. Fib with RVR         Relevant clinical data and objective history reviewed:  70 y.o. male 190.5 cm (75\") 107 kg (235 lb 3.7 oz)    Home Anticoagulation: none    Past Medical History:   Diagnosis Date    Disease of thyroid gland     Hyperlipidemia     Hypertension     Vertigo      is allergic to codeine.    Lab Results   Component Value Date    WBC 9.10 03/05/2020    HGB 17.6 03/05/2020    HCT 51.2 (H) 03/05/2020    MCV 90.9 03/05/2020     03/05/2020     Lab Results   Component Value Date    GLUCOSE 151 (H) 03/05/2020    CALCIUM 9.6 03/05/2020     03/05/2020    K 3.6 03/05/2020    CO2 22.0 03/05/2020     03/05/2020    BUN 11 03/05/2020    CREATININE 0.96 03/05/2020    EGFRIFNONA 77 03/05/2020    BCR 11.5 03/05/2020    ANIONGAP 16.0 (H) 03/05/2020       Estimated Creatinine Clearance: 92 mL/min (by C-G formula based on SCr of 0.96 mg/dL).    Active Inpatient Anticoagulation Orders: none    Assessment/Plan    1) Will start patient on Lovenox 110 mg (1 mg/kg) subcutaneous every 12 hours.     2) Will monitor patient's renal function every 24 hours, platelets at least every 3 days, and adjust as needed.     3) Pharmacy will discontinue the Pharmacy to Dose Lovenox consult at this time. Renal function will continue to be monitored and dosing adjustments will be made by pharmacy based on renal function if necessary     Thank you for allowing us to participate in this patient's care.      Bety Florentino, [facility]  03/06/20 12:34 AM    "

## 2020-03-06 NOTE — PROGRESS NOTES
Discharge Planning Assessment   Samuel     Patient Name: Filipe Altamirano  MRN: [id number]  Today's Date: 3/6/2020    Admit Date: 3/5/2020    Discharge Needs Assessment     Row Name 03/06/20 1451       Living Environment    Lives With  spouse    Name(s) of Who Lives With Patient  Patricia    Current Living Arrangements  home/apartment/condo    Primary Care Provided by  self    Able to Return to Prior Arrangements  yes       Resource/Environmental Concerns    Resource/Environmental Concerns  none    Transportation Concerns  car, none       Transition Planning    Patient/Family Anticipates Transition to  home with family    Patient/Family Anticipated Services at Transition  none    Transportation Anticipated  car, drives self;family or friend will provide       Discharge Needs Assessment    Readmission Within the Last 30 Days  no previous admission in last 30 days    Concerns to be Addressed  no discharge needs identified    Equipment Currently Used at Home  none    Anticipated Changes Related to Illness  none    Equipment Needed After Discharge  none        Discharge Plan     Row Name 03/06/20 1452       Plan    Plan  Routine d/c to home           Demographic Summary     Row Name 03/06/20 1450       General Information    Admission Type  observation    Arrived From  emergency department    Required Notices Provided  Observation Status Notice    Referral Source  admission list    Reason for Consult  discharge planning    Preferred Language  English        Functional Status     Row Name 03/06/20 1451       Functional Status, IADL    Medications  independent    Meal Preparation  independent    Housekeeping  independent    Laundry  independent    Shopping  independent        DC Barriers - Pending Cardiology Consult     [facility]  To check pricing of Eliquis 5 mg BID. Spoke to Karen and she states that cost to patient is $482. Checked on price of Xarelto 20mg daily and cost is the same.  Patient informed and secure chat message sent to MD Yulia Mondragon RN, CM  Office Phone [phone]  Cell [phone]

## 2020-03-06 NOTE — PLAN OF CARE
Patient reports no chest pain. He is off the cardizem drip and is in normal sinus rhythm.    Patient is steady on his feet and his skin is intact.    Our main plan for the day is to complete the cardiology consult that is ordered.

## 2020-03-10 ENCOUNTER — ANTICOAGULATION VISIT (OUTPATIENT)
Dept: CARDIOLOGY | Facility: CLINIC | Age: 70
End: 2020-03-10

## 2020-03-10 VITALS
BODY MASS INDEX: 31.12 KG/M2 | DIASTOLIC BLOOD PRESSURE: 70 MMHG | SYSTOLIC BLOOD PRESSURE: 130 MMHG | WEIGHT: 249 LBS | HEART RATE: 56 BPM

## 2020-03-10 DIAGNOSIS — Z79.01 LONG TERM (CURRENT) USE OF ANTICOAGULANTS: ICD-10-CM

## 2020-03-10 DIAGNOSIS — I48.0 PAROXYSMAL ATRIAL FIBRILLATION (HCC): ICD-10-CM

## 2020-03-10 PROBLEM — I48.91 ATRIAL FIBRILLATION: Status: ACTIVE | Noted: 2020-03-10

## 2020-03-10 LAB — INR PPP: 1.1 (ref 0.9–1.1)

## 2020-03-10 PROCEDURE — 36416 COLLJ CAPILLARY BLOOD SPEC: CPT | Performed by: INTERNAL MEDICINE

## 2020-03-10 PROCEDURE — 85610 PROTHROMBIN TIME: CPT | Performed by: INTERNAL MEDICINE

## 2020-03-13 ENCOUNTER — OFFICE VISIT (OUTPATIENT)
Dept: CARDIOLOGY | Facility: CLINIC | Age: 70
End: 2020-03-13

## 2020-03-13 ENCOUNTER — ANTICOAGULATION VISIT (OUTPATIENT)
Dept: CARDIOLOGY | Facility: CLINIC | Age: 70
End: 2020-03-13

## 2020-03-13 ENCOUNTER — TELEPHONE (OUTPATIENT)
Dept: CARDIOLOGY | Facility: CLINIC | Age: 70
End: 2020-03-13

## 2020-03-13 VITALS
DIASTOLIC BLOOD PRESSURE: 82 MMHG | HEART RATE: 80 BPM | BODY MASS INDEX: 30.71 KG/M2 | OXYGEN SATURATION: 98 % | SYSTOLIC BLOOD PRESSURE: 140 MMHG | HEIGHT: 75 IN | WEIGHT: 247 LBS

## 2020-03-13 VITALS
BODY MASS INDEX: 30.87 KG/M2 | DIASTOLIC BLOOD PRESSURE: 82 MMHG | SYSTOLIC BLOOD PRESSURE: 140 MMHG | HEART RATE: 80 BPM | WEIGHT: 247 LBS

## 2020-03-13 DIAGNOSIS — I48.0 PAROXYSMAL ATRIAL FIBRILLATION (HCC): ICD-10-CM

## 2020-03-13 DIAGNOSIS — I10 ESSENTIAL HYPERTENSION: Primary | Chronic | ICD-10-CM

## 2020-03-13 DIAGNOSIS — R00.2 PALPITATIONS: ICD-10-CM

## 2020-03-13 DIAGNOSIS — E03.9 HYPOTHYROIDISM (ACQUIRED): ICD-10-CM

## 2020-03-13 DIAGNOSIS — R06.09 DYSPNEA ON EXERTION: ICD-10-CM

## 2020-03-13 DIAGNOSIS — I48.91 ATRIAL FIBRILLATION WITH RAPID VENTRICULAR RESPONSE (HCC): ICD-10-CM

## 2020-03-13 DIAGNOSIS — Z79.01 LONG TERM (CURRENT) USE OF ANTICOAGULANTS: ICD-10-CM

## 2020-03-13 LAB — INR PPP: 1.5 (ref 0.9–1.1)

## 2020-03-13 PROCEDURE — 85610 PROTHROMBIN TIME: CPT | Performed by: INTERNAL MEDICINE

## 2020-03-13 PROCEDURE — 99213 OFFICE O/P EST LOW 20 MIN: CPT | Performed by: INTERNAL MEDICINE

## 2020-03-13 PROCEDURE — 93010 ELECTROCARDIOGRAM REPORT: CPT | Performed by: INTERNAL MEDICINE

## 2020-03-13 PROCEDURE — 36416 COLLJ CAPILLARY BLOOD SPEC: CPT | Performed by: INTERNAL MEDICINE

## 2020-03-13 RX ORDER — WARFARIN SODIUM 5 MG/1
TABLET ORAL
Qty: 45 TABLET | Refills: 2 | Status: SHIPPED | OUTPATIENT
Start: 2020-03-13 | End: 2020-06-11

## 2020-03-13 NOTE — TELEPHONE ENCOUNTER
Pt needs refill on Warfarin 5 mg tabs. Send to [facility]. #45 tabs. Will send in RX. [email or web address]

## 2020-03-13 NOTE — PROGRESS NOTES
"    Subjective:     Encounter Date:03/13/2020      Patient ID: Filipe Altamirano is a 70 y.o. male.    Chief Complaint: hosp follow up atrial fibrillation paroxysmal  History of Present Illness    70-year-old white male patient with previous history of hypertension dyslipidemia history of hypothyroidism no cardiac issues had a negative stress test November 2019 now admitted to the hospital with an episode of palpitation and found to have new onset atrial fibrillation with rapid ventricular rate 2020  Patient subsequently converted into sinus rhythm somewhat spontaneously  Patient is staying in sinus rhythm without any further symptoms denies of any chest pain shortness of breath syncopal episodes lower extremity edema PND orthopnea  Patient is currently taking low-dose beta-blockers and warfarin  We will schedule the echocardiogram follow-up in 3 months with EKG    The following portions of the patient's history were reviewed and updated as appropriate: Allergies current medications past family history past medical history past social history past surgical history problem list and review of systems  Past Medical History:   Diagnosis Date   • Disease of thyroid gland    • Hyperlipidemia    • Hypertension    • Vertigo      Past Surgical History:   Procedure Laterality Date   • ABDOMINAL SURGERY     • COLON SURGERY     • HERNIA REPAIR       /82 (BP Location: Left arm, Patient Position: Sitting, Cuff Size: Adult)   Pulse 80   Ht 190.5 cm (75\")   Wt 112 kg (247 lb)   SpO2 98%   BMI 30.87 kg/m²   History reviewed. No pertinent family history.    Current Outpatient Medications:   •  levothyroxine (SYNTHROID, LEVOTHROID) 25 MCG tablet, Take 25 mcg by mouth Every Evening., Disp: , Rfl:   •  lisinopril (PRINIVIL,ZESTRIL) 5 MG tablet, Take 1 tablet by mouth Every Evening., Disp: 30 tablet, Rfl: 2  •  metoprolol tartrate (LOPRESSOR) 25 MG tablet, Take 1 tablet by mouth Every 12 (Twelve) Hours., Disp: 60 tablet, Rfl: " 2  •  warfarin (COUMADIN) 5 MG tablet, Take daily, repeat INR in 5 days  Indications: Atrial Fibrillation, Disp: 20 tablet, Rfl: 0  Social History     Socioeconomic History   • Marital status:      Spouse name: Not on file   • Number of children: Not on file   • Years of education: Not on file   • Highest education level: Not on file   Tobacco Use   • Smoking status: Former Smoker     Last attempt to quit:      Years since quittin.2   • Smokeless tobacco: Never Used   Substance and Sexual Activity   • Alcohol use: No     Frequency: Never   • Drug use: No     Allergies   Allergen Reactions   • Codeine Paresthesia     Review of Systems   Constitution: Negative for fever and malaise/fatigue.   HENT: Negative for congestion and hearing loss.    Eyes: Negative for double vision and visual disturbance.   Cardiovascular: Negative for chest pain, claudication, dyspnea on exertion, leg swelling and syncope.   Respiratory: Negative for cough and shortness of breath.    Endocrine: Negative for cold intolerance.   Skin: Negative for color change and rash.   Musculoskeletal: Negative for arthritis and joint pain.   Gastrointestinal: Negative for abdominal pain and heartburn.   Genitourinary: Negative for hematuria.   Neurological: Negative for excessive daytime sleepiness and dizziness.   Psychiatric/Behavioral: Negative for depression. The patient is not nervous/anxious.    All other systems reviewed and are negative.             Objective:     Physical Exam   Constitutional: He is oriented to person, place, and time. He appears well-developed and well-nourished. He is cooperative.   HENT:   Head: Normocephalic and atraumatic.   Mouth/Throat: Uvula is midline and oropharynx is clear and moist. No oral lesions.   Eyes: Conjunctivae are normal. No scleral icterus.   Neck: Trachea normal. Neck supple. No JVD present. Carotid bruit is not present. No thyromegaly present.   Cardiovascular: Normal rate, regular  rhythm, S1 normal, S2 normal, normal heart sounds, intact distal pulses and normal pulses. PMI is not displaced. Exam reveals no gallop and no friction rub.   No murmur heard.  Pulmonary/Chest: Effort normal and breath sounds normal.   Abdominal: Soft. Bowel sounds are normal.   Musculoskeletal: Normal range of motion.   Neurological: He is alert and oriented to person, place, and time. He has normal strength.   No focal deficits   Skin: Skin is warm. No cyanosis.   Psychiatric: He has a normal mood and affect.       Procedures    Lab Review:       Assessment:          Diagnosis Plan   1. Essential hypertension  Adult Transthoracic Echo Complete W/ Cont if Necessary Per Protocol   2. Atrial fibrillation with rapid ventricular response (CMS/HCC)  Adult Transthoracic Echo Complete W/ Cont if Necessary Per Protocol   3. Hypothyroidism (acquired)  Adult Transthoracic Echo Complete W/ Cont if Necessary Per Protocol   4. Paroxysmal atrial fibrillation (CMS/HCC)  Adult Transthoracic Echo Complete W/ Cont if Necessary Per Protocol   5. Palpitations  Adult Transthoracic Echo Complete W/ Cont if Necessary Per Protocol   6. Dyspnea on exertion  Adult Transthoracic Echo Complete W/ Cont if Necessary Per Protocol          Plan:       Continue aggressive control of hypertension  Patient will continue anticoagulation currently in sinus rhythm we will continue beta-blockers  Check the echocardiogram follow-up in 3 months

## 2020-03-17 ENCOUNTER — HOSPITAL ENCOUNTER (OUTPATIENT)
Dept: CARDIOLOGY | Facility: HOSPITAL | Age: 70
Discharge: HOME | End: 2020-03-17
Admitting: INTERNAL MEDICINE

## 2020-03-17 VITALS
DIASTOLIC BLOOD PRESSURE: 62 MMHG | HEIGHT: 75 IN | SYSTOLIC BLOOD PRESSURE: 134 MMHG | WEIGHT: 231 LBS | BODY MASS INDEX: 28.72 KG/M2

## 2020-03-17 DIAGNOSIS — I10 ESSENTIAL HYPERTENSION: ICD-10-CM

## 2020-03-17 DIAGNOSIS — R06.09 DYSPNEA ON EXERTION: ICD-10-CM

## 2020-03-17 DIAGNOSIS — E03.9 HYPOTHYROIDISM (ACQUIRED): ICD-10-CM

## 2020-03-17 DIAGNOSIS — R00.2 PALPITATIONS: ICD-10-CM

## 2020-03-17 DIAGNOSIS — I48.0 PAROXYSMAL ATRIAL FIBRILLATION (HCC): ICD-10-CM

## 2020-03-17 DIAGNOSIS — I48.91 ATRIAL FIBRILLATION WITH RAPID VENTRICULAR RESPONSE (HCC): ICD-10-CM

## 2020-03-17 PROCEDURE — 93306 TTE W/DOPPLER COMPLETE: CPT

## 2020-03-18 LAB
BH CV ECHO MEAS - AI DEC SLOPE: 179.6 CM/SEC^2
BH CV ECHO MEAS - AI DEC TIME: 2.5 SEC
BH CV ECHO MEAS - AI MAX PG: 81.9 MMHG
BH CV ECHO MEAS - AI MAX VEL: 452.6 CM/SEC
BH CV ECHO MEAS - AI P1/2T: 738.2 MSEC
BH CV ECHO MEAS - AO MAX PG (FULL): 0.53 MMHG
BH CV ECHO MEAS - AO MAX PG: 6.2 MMHG
BH CV ECHO MEAS - AO MEAN PG (FULL): 0.58 MMHG
BH CV ECHO MEAS - AO MEAN PG: 3.7 MMHG
BH CV ECHO MEAS - AO ROOT AREA: 11.7 CM^2
BH CV ECHO MEAS - AO ROOT DIAM: 3.9 CM
BH CV ECHO MEAS - AO V2 MAX: 124.1 CM/SEC
BH CV ECHO MEAS - AO V2 MEAN: 92.6 CM/SEC
BH CV ECHO MEAS - AO V2 VTI: 30.8 CM
BH CV ECHO MEAS - AVA(I,A): 3.4 CM^2
BH CV ECHO MEAS - AVA(I,D): 3.4 CM^2
BH CV ECHO MEAS - AVA(V,A): 3.6 CM^2
BH CV ECHO MEAS - AVA(V,D): 3.6 CM^2
BH CV ECHO MEAS - EDV(CUBED): 116.2 ML
BH CV ECHO MEAS - EDV(MOD-SP4): 140.4 ML
BH CV ECHO MEAS - EDV(TEICH): 111.7 ML
BH CV ECHO MEAS - EF(CUBED): 56.9 %
BH CV ECHO MEAS - EF(MOD-SP4): 65.8 %
BH CV ECHO MEAS - EF(TEICH): 48.4 %
BH CV ECHO MEAS - ESV(CUBED): 50.1 ML
BH CV ECHO MEAS - ESV(MOD-SP4): 48 ML
BH CV ECHO MEAS - ESV(TEICH): 57.6 ML
BH CV ECHO MEAS - FS: 24.5 %
BH CV ECHO MEAS - IVS/LVPW: 1.2
BH CV ECHO MEAS - IVSD: 0.87 CM
BH CV ECHO MEAS - LA DIMENSION: 3.7 CM
BH CV ECHO MEAS - LA/AO: 0.95
BH CV ECHO MEAS - LV MASS(C)D: 129.6 GRAMS
BH CV ECHO MEAS - LV MAX PG: 5.6 MMHG
BH CV ECHO MEAS - LV MEAN PG: 3.1 MMHG
BH CV ECHO MEAS - LV V1 MAX: 118.6 CM/SEC
BH CV ECHO MEAS - LV V1 MEAN: 84.2 CM/SEC
BH CV ECHO MEAS - LV V1 VTI: 28 CM
BH CV ECHO MEAS - LVIDD: 4.9 CM
BH CV ECHO MEAS - LVIDS: 3.7 CM
BH CV ECHO MEAS - LVOT AREA: 3.7 CM^2
BH CV ECHO MEAS - LVOT DIAM: 2.2 CM
BH CV ECHO MEAS - LVPWD: 0.72 CM
BH CV ECHO MEAS - MV A MAX VEL: 93.1 CM/SEC
BH CV ECHO MEAS - MV DEC SLOPE: 506 CM/SEC^2
BH CV ECHO MEAS - MV DEC TIME: 0.17 SEC
BH CV ECHO MEAS - MV E MAX VEL: 84.6 CM/SEC
BH CV ECHO MEAS - MV E/A: 0.91
BH CV ECHO MEAS - MV MAX PG: 4.4 MMHG
BH CV ECHO MEAS - MV MEAN PG: 1.4 MMHG
BH CV ECHO MEAS - MV V2 MAX: 104.8 CM/SEC
BH CV ECHO MEAS - MV V2 MEAN: 54.9 CM/SEC
BH CV ECHO MEAS - MV V2 VTI: 27.8 CM
BH CV ECHO MEAS - MVA(VTI): 3.8 CM^2
BH CV ECHO MEAS - PULM DIAS VEL: 43.7 CM/SEC
BH CV ECHO MEAS - PULM S/D: 1.4
BH CV ECHO MEAS - PULM SYS VEL: 62.3 CM/SEC
BH CV ECHO MEAS - RAP SYSTOLE: 3 MMHG
BH CV ECHO MEAS - RVSP: 24.2 MMHG
BH CV ECHO MEAS - SV(AO): 361.2 ML
BH CV ECHO MEAS - SV(CUBED): 66.1 ML
BH CV ECHO MEAS - SV(LVOT): 104.2 ML
BH CV ECHO MEAS - SV(MOD-SP4): 92.4 ML
BH CV ECHO MEAS - SV(TEICH): 54.1 ML
BH CV ECHO MEAS - TR MAX VEL: 230.2 CM/SEC
LV EF 2D ECHO EST: 55 %
MAXIMAL PREDICTED HEART RATE: 148 BPM
STRESS TARGET HR: 126 BPM

## 2020-03-18 PROCEDURE — 93306 TTE W/DOPPLER COMPLETE: CPT | Performed by: INTERNAL MEDICINE

## 2020-03-26 NOTE — DISCHARGE SUMMARY
[facility] Medicine Services  DISCHARGE SUMMARY        Prepared For PCP:  Frantz Saleem MD    Patient Name: Filipe Altamirano  : 1950  MRN: [id number]      Date of Admission:   3/5/2020    Date of Discharge:  3/6/2020     Length of stay:  LOS: 0 days     Hospital Course     Presenting Problem:   Atrial fibrillation with rapid ventricular response (CMS/HCC) [I48.91]      Active Hospital Problems    Diagnosis  POA   • **Atrial fibrillation with rapid ventricular response (CMS/HCC) [I48.91]  Yes   • Essential hypertension [I10]  Yes   • Hypothyroidism (acquired) [E03.9]  Yes      Resolved Hospital Problems   No resolved problems to display.       Active Hospital Problems:  No notes have been filed under this hospital service.  Service: Hospitalist      Resolved Hospital Problems:  No notes have been filed under this hospital service.  Service: Hospitalist        Hospital Course:  Filipe Altamirano is a 70 y.o. male admitted with:     1.  A. fib with RVR  -EKG shows A. fib with RVR with no obvious ST changes.   -Chest x-ray shows no acute cardiopulmonary changes.  -Cardizem started in the ED with improvement of rate to between , continue  -TSH: 4.670  -Check INR and PT  -Lovenox ordered in the ED pharmacy to dose, continue  -Continue cardiac monitoring  -Cardiology consulted     2.  Hypertension  -Well controlled with a blood pressure on admission of 134/78  - Continue Lisinopril  - Monitor while admittied     3.  Hypothyroidism  -TSH: 4.670, free T4: 1.31  -Continue levothyroxine        VTE Prophylaxis -full dose Lovenox      Recommendation for Outpatient Providers:             Reasons For Change In Medications and Indications for New Medications:        Day of Discharge     HPI:   No complaints     Vital Signs:   Vitals:    20 1420   BP: 115/64   Pulse:    Resp: 18   Temp: 98.1 °F (36.7 °C)   SpO2:      Stable    Physical Exam:  Physical Exam  Unchanged from previously  documented physical exam by hospitalist provider     Pertinent  and/or Most Recent Results               Invalid input(s): PROT, LABALBU        Invalid input(s): TG, LDLCALC, LDLREALC        Brief Urine Lab Results  (Last result in the past 365 days)      Color   Clarity   Blood   Leuk Est   Nitrite   Protein   CREAT   Urine HCG        09/01/19 1458 Yellow Clear Negative Negative Negative Negative               Microbiology Results Abnormal     None          Xr Chest 1 View    Result Date: 3/5/2020  Impression: No acute cardiopulmonary abnormality.  Electronically Signed By-Krishan Ritchie On:3/5/2020 5:50 PM This report was finalized on [id number] by  Krishan Ritchie, .                             Test Results Pending at Discharge        Procedures Performed           Consults:   Consults     Date and Time Order Name Status Description    3/5/2020 4984 Inpatient Cardiology Consult Completed     3/5/2020 1801 Hospitalist (on-call MD unless specified) Completed             Discharge Details        Discharge Medications      New Medications      Instructions Start Date   metoprolol tartrate 25 MG tablet  Commonly known as:  LOPRESSOR   25 mg, Oral, Every 12 Hours Scheduled      warfarin 5 MG tablet  Commonly known as:  COUMADIN   Take daily, repeat INR in 5 days         Changes to Medications      Instructions Start Date   lisinopril 5 MG tablet  Commonly known as:  PRINIVIL,ZESTRIL  What changed:    · medication strength  · how much to take   5 mg, Oral, Every Evening         Continue These Medications      Instructions Start Date   levothyroxine 25 MCG tablet  Commonly known as:  SYNTHROID, LEVOTHROID   25 mcg, Oral, Every Evening             Allergies   Allergen Reactions   • Codeine Paresthesia         Discharge Disposition:  Home or Self Care    Diet:  Hospital:No active diet order        Discharge Activity:     Activity as tolerated     CODE STATUS:    Code Status and Medical Interventions:   Ordered at:  03/05/20 1920     Level Of Support Discussed With:    Patient     Code Status:    CPR     Medical Interventions (Level of Support Prior to Arrest):    Full         Follow-up Appointments  Future Appointments   Date Time Provider Department Center   3/26/2020  9:45 AM [facility]   7/10/2020 10:40 AM Fracisco Villareal MD [facility]             Condition on Discharge:      Stable          Electronically signed by Padmini Corrales MD, 03/26/20, 3:05 AM.    Time: I spent  30  minutes on this discharge activity which included face-to-face encounter with the patient/reviewing the data in the system/coordination of the care with the nursing staff as well as consultants/documentation/entering orders.

## 2020-05-13 ENCOUNTER — CLINICAL SUPPORT (OUTPATIENT)
Dept: CARDIOLOGY | Facility: CLINIC | Age: 70
End: 2020-05-13

## 2020-05-13 ENCOUNTER — TELEMEDICINE (OUTPATIENT)
Dept: CARDIOLOGY | Facility: CLINIC | Age: 70
End: 2020-05-13

## 2020-05-13 VITALS
DIASTOLIC BLOOD PRESSURE: 72 MMHG | HEIGHT: 74 IN | WEIGHT: 235 LBS | BODY MASS INDEX: 30.16 KG/M2 | SYSTOLIC BLOOD PRESSURE: 110 MMHG

## 2020-05-13 VITALS — DIASTOLIC BLOOD PRESSURE: 72 MMHG | SYSTOLIC BLOOD PRESSURE: 110 MMHG

## 2020-05-13 DIAGNOSIS — I10 ESSENTIAL HYPERTENSION: Chronic | ICD-10-CM

## 2020-05-13 DIAGNOSIS — I10 ESSENTIAL HYPERTENSION: ICD-10-CM

## 2020-05-13 DIAGNOSIS — I48.91 ATRIAL FIBRILLATION WITH RAPID VENTRICULAR RESPONSE (HCC): Primary | ICD-10-CM

## 2020-05-13 DIAGNOSIS — E03.9 HYPOTHYROIDISM (ACQUIRED): ICD-10-CM

## 2020-05-13 DIAGNOSIS — I48.0 PAROXYSMAL ATRIAL FIBRILLATION (HCC): Primary | ICD-10-CM

## 2020-05-13 PROCEDURE — 93000 ELECTROCARDIOGRAM COMPLETE: CPT | Performed by: INTERNAL MEDICINE

## 2020-05-13 PROCEDURE — 99214 OFFICE O/P EST MOD 30 MIN: CPT | Performed by: INTERNAL MEDICINE

## 2020-05-13 NOTE — PROGRESS NOTES
You have chosen to receive care through a telehealth visit.  Do you consent to use a video/audio connection for your medical care today? Yes      Subjective:     Encounter Date:05/13/2020      Patient ID: Filipe Altamirano is a 70 y.o. male.    Chief Complaint: Paroxysmal atrial fibrillation  History of Present Illness     70-year-old white male patient with previous history of hypertension dyslipidemia history of hypothyroidism no cardiac issues had a negative stress test November 2019 now admitted to the hospital with an episode of palpitation and found to have new onset atrial fibrillation with rapid ventricular rate 2020  Patient subsequently converted into sinus rhythm somewhat spontaneously  Patient is staying in sinus rhythm without any further symptoms denies of any chest pain shortness of breath syncopal episodes lower extremity edema PND orthopnea  Patient is currently taking low-dose beta-blockers and warfarin  Patient echocardiogram March 2020 EF 55% mild to moderate aortic insufficiency mild mitral insufficiency noted  Patient called me this morning he went into atrial fibrillation came to the office and EKG showed atrial fibrillation  Advised him to increase metoprolol to 25 mg 3 times daily and he did a video call today  During the video call he rechecked his Apple Watch and now he is back in sinus rhythm  I will follow him in 3 months I gave him the options of ablation he would like to continue current medications  Also discussed with the patient about options of possible antiarrhythmic medications  The following portions of the patient's history were reviewed and updated as appropriate: Allergies current medications past family history past medical history past social history past surgical history problem list and review of systems  Past Medical History:   Diagnosis Date   • Atrial fibrillation (CMS/HCC)    • Disease of thyroid gland    • Hyperlipidemia    • Hypertension    • Vertigo      Past Surgical  History:   Procedure Laterality Date   • ABDOMINAL SURGERY     • COLON SURGERY     • HERNIA REPAIR       /70   Family History   Problem Relation Age of Onset   • No Known Problems Mother    • No Known Problems Father    • Arrhythmia Brother        Current Outpatient Medications:   •  levothyroxine (SYNTHROID, LEVOTHROID) 25 MCG tablet, Take 25 mcg by mouth Every Evening., Disp: , Rfl:   •  lisinopril (PRINIVIL,ZESTRIL) 5 MG tablet, Take 1 tablet by mouth Every Evening. (Patient taking differently: Take 5 mg by mouth 2 (Two) Times a Day.), Disp: 30 tablet, Rfl: 2  •  metoprolol tartrate (LOPRESSOR) 25 MG tablet, Take 1 tablet by mouth Every 12 (Twelve) Hours., Disp: 60 tablet, Rfl: 2  •  warfarin (COUMADIN) 5 MG tablet, Take daily, repeat INR in 5 days  Indications: Atrial Fibrillation, Disp: 20 tablet, Rfl: 0  •  warfarin (COUMADIN) 5 MG tablet, Take 1 1/2 tabs by mouth daily or as directed., Disp: 45 tablet, Rfl: 2  Social History     Socioeconomic History   • Marital status:      Spouse name: Not on file   • Number of children: Not on file   • Years of education: Not on file   • Highest education level: Not on file   Tobacco Use   • Smoking status: Former Smoker     Last attempt to quit: 2009     Years since quittin.3   • Smokeless tobacco: Never Used   Substance and Sexual Activity   • Alcohol use: No     Frequency: Never   • Drug use: No   • Sexual activity: Defer     Allergies   Allergen Reactions   • Codeine Paresthesia     Review of Systems   Constitution: Negative for fever and malaise/fatigue.   HENT: Negative for congestion and hearing loss.    Eyes: Negative for double vision and visual disturbance.   Cardiovascular: Negative for chest pain, claudication, dyspnea on exertion, leg swelling and syncope.   Respiratory: Negative for cough and shortness of breath.    Endocrine: Negative for cold intolerance.   Skin: Negative for color change and rash.   Musculoskeletal: Negative for arthritis  and joint pain.   Gastrointestinal: Negative for abdominal pain and heartburn.   Genitourinary: Negative for hematuria.   Neurological: Negative for excessive daytime sleepiness and dizziness.   Psychiatric/Behavioral: Negative for depression. The patient is not nervous/anxious.    All other systems reviewed and are negative.             Objective:     Physical Exam  Patient is alert oriented not in any acute distress    ECG 12 Lead  Date/Time: 5/13/2020 1:22 PM  Performed by: Fracisco Villareal MD  Authorized by: Fracisco Villareal MD   Comments: EKG today atrial fibrillation with controlled ventricular rate compared to the last EKG atrial fibrillation is now present            Lab Review:       Assessment:          Diagnosis Plan   1. Paroxysmal atrial fibrillation (CMS/HCC)     2. Essential hypertension     3. Hypothyroidism (acquired)            Plan:       Atrial fibrillation with recurrent episodes I will check his electrolytes and TSH  Patient also will continue metoprolol increase the dose to 3 times daily continue anticoagulation  Continue Synthroid check the TSH

## 2020-05-14 ENCOUNTER — LAB (OUTPATIENT)
Dept: LAB | Facility: HOSPITAL | Age: 70
End: 2020-05-14

## 2020-05-14 DIAGNOSIS — I10 ESSENTIAL HYPERTENSION: ICD-10-CM

## 2020-05-14 DIAGNOSIS — I48.0 PAROXYSMAL ATRIAL FIBRILLATION (HCC): ICD-10-CM

## 2020-05-14 DIAGNOSIS — E03.9 HYPOTHYROIDISM (ACQUIRED): ICD-10-CM

## 2020-05-14 LAB
ANION GAP SERPL CALCULATED.3IONS-SCNC: 11.1 MMOL/L (ref 5–15)
BUN BLD-MCNC: 13 MG/DL (ref 8–23)
BUN/CREAT SERPL: 13.4 (ref 7–25)
CALCIUM SPEC-SCNC: 9.3 MG/DL (ref 8.6–10.5)
CHLORIDE SERPL-SCNC: 102 MMOL/L (ref 98–107)
CO2 SERPL-SCNC: 28.9 MMOL/L (ref 22–29)
CREAT BLD-MCNC: 0.97 MG/DL (ref 0.76–1.27)
GFR SERPL CREATININE-BSD FRML MDRD: 76 ML/MIN/1.73
GLUCOSE BLD-MCNC: 93 MG/DL (ref 65–99)
MAGNESIUM SERPL-MCNC: 2.3 MG/DL (ref 1.6–2.4)
POTASSIUM BLD-SCNC: 4.4 MMOL/L (ref 3.5–5.2)
SODIUM BLD-SCNC: 142 MMOL/L (ref 136–145)
TSH SERPL DL<=0.05 MIU/L-ACNC: 4.48 UIU/ML (ref 0.27–4.2)

## 2020-05-14 PROCEDURE — 84443 ASSAY THYROID STIM HORMONE: CPT

## 2020-05-14 PROCEDURE — 83735 ASSAY OF MAGNESIUM: CPT

## 2020-05-14 PROCEDURE — 80048 BASIC METABOLIC PNL TOTAL CA: CPT

## 2020-05-14 PROCEDURE — 36415 COLL VENOUS BLD VENIPUNCTURE: CPT

## 2020-06-11 RX ORDER — WARFARIN SODIUM 5 MG/1
TABLET ORAL
Qty: 45 TABLET | Refills: 1 | Status: SHIPPED | OUTPATIENT
Start: 2020-06-11 | End: 2020-08-06

## 2020-07-10 ENCOUNTER — OFFICE VISIT (OUTPATIENT)
Dept: CARDIOLOGY | Facility: CLINIC | Age: 70
End: 2020-07-10

## 2020-07-10 VITALS
HEART RATE: 65 BPM | OXYGEN SATURATION: 94 % | HEIGHT: 75 IN | BODY MASS INDEX: 30.96 KG/M2 | SYSTOLIC BLOOD PRESSURE: 122 MMHG | WEIGHT: 249 LBS | DIASTOLIC BLOOD PRESSURE: 78 MMHG

## 2020-07-10 DIAGNOSIS — E03.9 HYPOTHYROIDISM (ACQUIRED): ICD-10-CM

## 2020-07-10 DIAGNOSIS — Z79.01 LONG TERM (CURRENT) USE OF ANTICOAGULANTS: ICD-10-CM

## 2020-07-10 DIAGNOSIS — I10 ESSENTIAL HYPERTENSION: Primary | Chronic | ICD-10-CM

## 2020-07-10 DIAGNOSIS — I48.91 ATRIAL FIBRILLATION WITH RAPID VENTRICULAR RESPONSE (HCC): ICD-10-CM

## 2020-07-10 DIAGNOSIS — I48.0 PAROXYSMAL ATRIAL FIBRILLATION (HCC): ICD-10-CM

## 2020-07-10 PROCEDURE — 99214 OFFICE O/P EST MOD 30 MIN: CPT | Performed by: INTERNAL MEDICINE

## 2020-07-10 RX ORDER — CLINDAMYCIN PHOSPHATE 11.9 MG/ML
SOLUTION TOPICAL
COMMUNITY
Start: 2020-07-08 | End: 2020-11-01 | Stop reason: SDUPTHER

## 2020-07-10 RX ORDER — TRIAMCINOLONE ACETONIDE 1 MG/G
CREAM TOPICAL
COMMUNITY
Start: 2020-06-18 | End: 2020-11-01 | Stop reason: SDUPTHER

## 2020-07-10 NOTE — PROGRESS NOTES
"    Subjective:     Encounter Date:07/10/2020      Patient ID: Filipe Altamirano is a 70 y.o. male.    Chief Complaint: Follow-up atrial fibrillation  History of Present Illness      70-year-old white male patient with previous history of hypertension dyslipidemia history of hypothyroidism no cardiac issues had a negative stress test November 2019 now admitted to the hospital with an episode of palpitation and found to have new onset atrial fibrillation with rapid ventricular rate 2020  Patient subsequently converted into sinus rhythm somewhat spontaneously  Patient is staying in sinus rhythm without any further symptoms denies of any chest pain shortness of breath syncopal episodes lower extremity edema PND orthopnea  Patient is currently taking  beta-blockers and warfarin    March 2020 echocardiogram EF 55% mild to moderate aortic insufficiency mild mitral insufficiency  Patient denies of any active symptoms we will continue current medications  TSH minimally elevated  The following portions of the patient's history were reviewed and updated as appropriate: Allergies current medications past family history past medical history past social history past surgical history problem list and review of systems  Past Medical History:   Diagnosis Date   • Atrial fibrillation (CMS/HCC)    • Disease of thyroid gland    • Hyperlipidemia    • Hypertension    • Vertigo      Past Surgical History:   Procedure Laterality Date   • ABDOMINAL SURGERY     • COLON SURGERY     • HERNIA REPAIR       /78 (BP Location: Right arm, Patient Position: Sitting, Cuff Size: Large Adult)   Pulse 65   Ht 190.5 cm (75\")   Wt 113 kg (249 lb)   SpO2 94%   BMI 31.12 kg/m²   Family History   Problem Relation Age of Onset   • No Known Problems Mother    • No Known Problems Father    • Arrhythmia Brother        Current Outpatient Medications:   •  ciclopirox (LOPROX) 0.77 % cream, , Disp: , Rfl:   •  clindamycin (CLEOCIN T) 1 % external solution, " , Disp: , Rfl:   •  levothyroxine (SYNTHROID, LEVOTHROID) 25 MCG tablet, Take 25 mcg by mouth Every Evening., Disp: , Rfl:   •  lisinopril (PRINIVIL,ZESTRIL) 5 MG tablet, Take 1 tablet by mouth Every Evening., Disp: 30 tablet, Rfl: 2  •  metoprolol tartrate (LOPRESSOR) 25 MG tablet, Take 1 tablet by mouth 3 (Three) Times a Day., Disp: 270 tablet, Rfl: 1  •  triamcinolone (KENALOG) 0.1 % cream, , Disp: , Rfl:   •  warfarin (COUMADIN) 5 MG tablet, Take daily, repeat INR in 5 days  Indications: Atrial Fibrillation, Disp: 20 tablet, Rfl: 0  •  warfarin (COUMADIN) 5 MG tablet, TAKE ONE AND ONE-HALF (1  1/2) TABLET BY MOUTH DAILY OR AS DIRECTED, Disp: 45 tablet, Rfl: 1  Social History     Socioeconomic History   • Marital status:      Spouse name: Not on file   • Number of children: Not on file   • Years of education: Not on file   • Highest education level: Not on file   Tobacco Use   • Smoking status: Former Smoker     Last attempt to quit:      Years since quittin.5   • Smokeless tobacco: Never Used   Substance and Sexual Activity   • Alcohol use: No     Frequency: Never   • Drug use: No   • Sexual activity: Defer     Allergies   Allergen Reactions   • Codeine Paresthesia     Review of Systems   Constitution: Negative for fever and malaise/fatigue.   HENT: Negative for congestion and hearing loss.    Eyes: Negative for double vision and visual disturbance.   Cardiovascular: Negative for chest pain, claudication, dyspnea on exertion, leg swelling, palpitations and syncope.   Respiratory: Negative for cough and shortness of breath.    Endocrine: Negative for cold intolerance.   Skin: Negative for color change and rash.   Musculoskeletal: Negative for arthritis and joint pain.   Gastrointestinal: Negative for abdominal pain and heartburn.   Genitourinary: Negative for hematuria.   Neurological: Negative for excessive daytime sleepiness and dizziness.   Psychiatric/Behavioral: Negative for depression. The  patient is not nervous/anxious.    All other systems reviewed and are negative.             Objective:     Physical Exam   Constitutional: He is oriented to person, place, and time. He appears well-developed and well-nourished. He is cooperative.   HENT:   Head: Normocephalic and atraumatic.   Mouth/Throat: Uvula is midline and oropharynx is clear and moist. No oral lesions.   Eyes: Conjunctivae are normal. No scleral icterus.   Neck: Trachea normal. Neck supple. No JVD present. Carotid bruit is not present. No thyromegaly present.   Cardiovascular: Normal rate, regular rhythm, S1 normal, S2 normal, normal heart sounds, intact distal pulses and normal pulses. PMI is not displaced. Exam reveals no gallop and no friction rub.   No murmur heard.  Pulmonary/Chest: Effort normal and breath sounds normal.   Abdominal: Soft. Bowel sounds are normal.   Musculoskeletal: Normal range of motion.   Neurological: He is alert and oriented to person, place, and time. He has normal strength.   No focal deficits   Skin: Skin is warm. No cyanosis.   Psychiatric: He has a normal mood and affect.       Procedures    Lab Review:       Assessment:          Diagnosis Plan   1. Essential hypertension  CK    Comprehensive Metabolic Panel    Lipid Panel    TSH   2. Atrial fibrillation with rapid ventricular response (CMS/HCC)  CK    Comprehensive Metabolic Panel    Lipid Panel    TSH   3. Hypothyroidism (acquired)  CK    Comprehensive Metabolic Panel    Lipid Panel    TSH   4. Long term (current) use of anticoagulants  CK    Comprehensive Metabolic Panel    Lipid Panel    TSH   5. Paroxysmal atrial fibrillation (CMS/HCC)            Plan:       Paroxysmal atrial ablation mostly staying in sinus rhythm  Continue anticoagulation  Hypertension stable  Hypothyroidism on replacement follow-up with PCP

## 2020-07-22 ENCOUNTER — TELEMEDICINE (OUTPATIENT)
Dept: CARDIOLOGY | Facility: CLINIC | Age: 70
End: 2020-07-22

## 2020-07-22 VITALS
SYSTOLIC BLOOD PRESSURE: 129 MMHG | DIASTOLIC BLOOD PRESSURE: 80 MMHG | HEIGHT: 75 IN | HEART RATE: 67 BPM | WEIGHT: 249 LBS | BODY MASS INDEX: 30.96 KG/M2

## 2020-07-22 DIAGNOSIS — I48.0 PAROXYSMAL ATRIAL FIBRILLATION (HCC): Primary | ICD-10-CM

## 2020-07-22 DIAGNOSIS — E03.9 HYPOTHYROIDISM (ACQUIRED): ICD-10-CM

## 2020-07-22 DIAGNOSIS — I10 ESSENTIAL HYPERTENSION: ICD-10-CM

## 2020-07-22 PROCEDURE — 99214 OFFICE O/P EST MOD 30 MIN: CPT | Performed by: INTERNAL MEDICINE

## 2020-07-22 RX ORDER — DILTIAZEM HYDROCHLORIDE 120 MG/1
120 CAPSULE, COATED, EXTENDED RELEASE ORAL DAILY
Qty: 90 CAPSULE | Refills: 1 | Status: SHIPPED | OUTPATIENT
Start: 2020-07-22 | End: 2020-07-31 | Stop reason: SINTOL

## 2020-07-22 RX ORDER — LOSARTAN POTASSIUM 25 MG/1
25 TABLET ORAL DAILY
Qty: 90 TABLET | Refills: 1 | Status: SHIPPED | OUTPATIENT
Start: 2020-07-22 | End: 2020-11-01 | Stop reason: SDUPTHER

## 2020-07-22 NOTE — PROGRESS NOTES
"You have chosen to receive care through a telehealth visit.  Do you consent to use a video/audio connection for your medical care today? Yes      Subjective:     Encounter Date:07/22/2020      Patient ID: Filipe Altamirano is a 70 y.o. male.    Chief Complaint: Body Aches, acute visit  History of Present Illness       70-year-old white male patient with previous history of hypertension dyslipidemia history of hypothyroidism no cardiac issues had a negative stress test November 2019     Patient was admitted beginning of 2020 with new onset atrial fibrillation      Patient subsequently converted into sinus rhythm somewhat spontaneously  Patient is doing video visit today  Patient is staying in sinus rhythm without any further symptoms denies of any chest pain shortness of breath syncopal episodes lower extremity edema PND orthopnea  Patient is currently taking  beta-blockers and warfarin     March 2020 echocardiogram EF 55% mild to moderate aortic insufficiency mild mitral insufficiency  Patient is complaining of not feeling very well taking beta-blockers and lisinopril he would like to change them  Will wean off the beta-blocker and start Cardizem  mg every day and start the losartan 25 mg every day stop the lisinopril 5 mg every day  Follow-up in 6 to 8 weeks    The following portions of the patient's history were reviewed and updated as appropriate: Allergies current medications past family history past medical history past social history past surgical history problem list and review of systems  Past Medical History:   Diagnosis Date   • Atrial fibrillation (CMS/HCC)    • Disease of thyroid gland    • Hyperlipidemia    • Hypertension    • Vertigo      Past Surgical History:   Procedure Laterality Date   • ABDOMINAL SURGERY     • COLON SURGERY     • HERNIA REPAIR       /80   Pulse 67   Ht 190.5 cm (75\")   Wt 113 kg (249 lb)   BMI 31.12 kg/m²   Family History   Problem Relation Age of Onset   • No Known " Problems Mother    • No Known Problems Father    • Arrhythmia Brother        Current Outpatient Medications:   •  ciclopirox (LOPROX) 0.77 % cream, , Disp: , Rfl:   •  clindamycin (CLEOCIN T) 1 % external solution, , Disp: , Rfl:   •  levothyroxine (SYNTHROID, LEVOTHROID) 25 MCG tablet, Take 25 mcg by mouth Every Evening., Disp: , Rfl:   •  triamcinolone (KENALOG) 0.1 % cream, , Disp: , Rfl:   •  warfarin (COUMADIN) 5 MG tablet, Take daily, repeat INR in 5 days  Indications: Atrial Fibrillation, Disp: 20 tablet, Rfl: 0  •  warfarin (COUMADIN) 5 MG tablet, TAKE ONE AND ONE-HALF (1  1/2) TABLET BY MOUTH DAILY OR AS DIRECTED, Disp: 45 tablet, Rfl: 1  •  dilTIAZem CD (CARDIZEM CD) 120 MG 24 hr capsule, Take 1 capsule by mouth Daily., Disp: 90 capsule, Rfl: 1  •  losartan (Cozaar) 25 MG tablet, Take 1 tablet by mouth Daily., Disp: 90 tablet, Rfl: 1  Social History     Socioeconomic History   • Marital status:      Spouse name: Not on file   • Number of children: Not on file   • Years of education: Not on file   • Highest education level: Not on file   Tobacco Use   • Smoking status: Former Smoker     Last attempt to quit:      Years since quittin.5   • Smokeless tobacco: Never Used   Substance and Sexual Activity   • Alcohol use: No     Frequency: Never   • Drug use: No   • Sexual activity: Defer     Allergies   Allergen Reactions   • Codeine Paresthesia     Review of Systems   Constitution: Negative for fever and malaise/fatigue.   HENT: Negative for congestion and hearing loss.    Eyes: Negative for double vision and visual disturbance.   Cardiovascular: Negative for chest pain, claudication, dyspnea on exertion, leg swelling and syncope.   Respiratory: Negative for cough and shortness of breath.    Endocrine: Negative for cold intolerance.   Skin: Negative for color change and rash.   Musculoskeletal: Negative for arthritis and joint pain.   Gastrointestinal: Negative for abdominal pain and heartburn.    Genitourinary: Negative for hematuria.   Neurological: Negative for excessive daytime sleepiness and dizziness.   Psychiatric/Behavioral: Negative for depression. The patient is not nervous/anxious.    All other systems reviewed and are negative.             Objective:     Physical Exam  Patient is alert oriented not in any acute distress vitals were noted blood pressure stable heart rate stable  Procedures    Lab Review:       Assessment:          Diagnosis Plan   1. Paroxysmal atrial fibrillation (CMS/HCC)     2. Essential hypertension     3. Hypothyroidism (acquired)            Plan:       Paroxysmal atrial fibrillation currently stable continue current medications  Hypertension patient not tolerating current medications we will switch to losartan and Cardizem  Continue anticoagulation continue thyroid replacement follow-up in 6 to 8 weeks

## 2020-07-28 ENCOUNTER — HOSPITAL ENCOUNTER (EMERGENCY)
Facility: HOSPITAL | Age: 70
Discharge: HOME | End: 2020-07-28
Attending: EMERGENCY MEDICINE | Admitting: EMERGENCY MEDICINE

## 2020-07-28 ENCOUNTER — TELEPHONE (OUTPATIENT)
Dept: CARDIOLOGY | Facility: CLINIC | Age: 70
End: 2020-07-28

## 2020-07-28 VITALS
HEIGHT: 75 IN | DIASTOLIC BLOOD PRESSURE: 64 MMHG | WEIGHT: 248.68 LBS | HEART RATE: 68 BPM | BODY MASS INDEX: 30.92 KG/M2 | SYSTOLIC BLOOD PRESSURE: 110 MMHG | TEMPERATURE: 97.8 F | OXYGEN SATURATION: 97 % | RESPIRATION RATE: 16 BRPM

## 2020-07-28 DIAGNOSIS — I48.0 PAROXYSMAL ATRIAL FIBRILLATION (HCC): Primary | ICD-10-CM

## 2020-07-28 LAB
ANION GAP SERPL CALCULATED.3IONS-SCNC: 11 MMOL/L (ref 5–15)
BASOPHILS # BLD AUTO: 0.1 10*3/MM3 (ref 0–0.2)
BASOPHILS NFR BLD AUTO: 1.3 % (ref 0–1.5)
BUN SERPL-MCNC: 15 MG/DL (ref 8–23)
BUN SERPL-MCNC: ABNORMAL MG/DL
BUN/CREAT SERPL: ABNORMAL
CALCIUM SPEC-SCNC: 9 MG/DL (ref 8.6–10.5)
CHLORIDE SERPL-SCNC: 104 MMOL/L (ref 98–107)
CO2 SERPL-SCNC: 25 MMOL/L (ref 22–29)
CREAT SERPL-MCNC: 0.94 MG/DL (ref 0.76–1.27)
DEPRECATED RDW RBC AUTO: 45.1 FL (ref 37–54)
EOSINOPHIL # BLD AUTO: 0.2 10*3/MM3 (ref 0–0.4)
EOSINOPHIL NFR BLD AUTO: 2.8 % (ref 0.3–6.2)
ERYTHROCYTE [DISTWIDTH] IN BLOOD BY AUTOMATED COUNT: 14 % (ref 12.3–15.4)
GFR SERPL CREATININE-BSD FRML MDRD: 79 ML/MIN/1.73
GLUCOSE SERPL-MCNC: 114 MG/DL (ref 65–99)
HCT VFR BLD AUTO: 43.6 % (ref 37.5–51)
HGB BLD-MCNC: 14.4 G/DL (ref 13–17.7)
HOLD SPECIMEN: NORMAL
INR PPP: 1.29 (ref 2–3)
LYMPHOCYTES # BLD AUTO: 1.6 10*3/MM3 (ref 0.7–3.1)
LYMPHOCYTES NFR BLD AUTO: 17.7 % (ref 19.6–45.3)
MAGNESIUM SERPL-MCNC: 1.8 MG/DL (ref 1.6–2.4)
MCH RBC QN AUTO: 30.3 PG (ref 26.6–33)
MCHC RBC AUTO-ENTMCNC: 33.1 G/DL (ref 31.5–35.7)
MCV RBC AUTO: 91.7 FL (ref 79–97)
MONOCYTES # BLD AUTO: 0.8 10*3/MM3 (ref 0.1–0.9)
MONOCYTES NFR BLD AUTO: 8.7 % (ref 5–12)
NEUTROPHILS NFR BLD AUTO: 6.3 10*3/MM3 (ref 1.7–7)
NEUTROPHILS NFR BLD AUTO: 69.5 % (ref 42.7–76)
NRBC BLD AUTO-RTO: 0.1 /100 WBC (ref 0–0.2)
PLATELET # BLD AUTO: 264 10*3/MM3 (ref 140–450)
PMV BLD AUTO: 7.1 FL (ref 6–12)
POTASSIUM SERPL-SCNC: 4.1 MMOL/L (ref 3.5–5.2)
PROTHROMBIN TIME: 13 SECONDS (ref 19.4–28.5)
RBC # BLD AUTO: 4.75 10*6/MM3 (ref 4.14–5.8)
SODIUM SERPL-SCNC: 140 MMOL/L (ref 136–145)
TROPONIN T SERPL-MCNC: <0.01 NG/ML (ref 0–0.03)
TSH SERPL DL<=0.05 MIU/L-ACNC: 7.84 UIU/ML (ref 0.27–4.2)
WBC # BLD AUTO: 9 10*3/MM3 (ref 3.4–10.8)

## 2020-07-28 PROCEDURE — 93005 ELECTROCARDIOGRAM TRACING: CPT | Performed by: EMERGENCY MEDICINE

## 2020-07-28 PROCEDURE — 96374 THER/PROPH/DIAG INJ IV PUSH: CPT

## 2020-07-28 PROCEDURE — 84484 ASSAY OF TROPONIN QUANT: CPT | Performed by: EMERGENCY MEDICINE

## 2020-07-28 PROCEDURE — 85025 COMPLETE CBC W/AUTO DIFF WBC: CPT | Performed by: EMERGENCY MEDICINE

## 2020-07-28 PROCEDURE — 99283 EMERGENCY DEPT VISIT LOW MDM: CPT

## 2020-07-28 PROCEDURE — 85610 PROTHROMBIN TIME: CPT | Performed by: EMERGENCY MEDICINE

## 2020-07-28 PROCEDURE — 84443 ASSAY THYROID STIM HORMONE: CPT | Performed by: EMERGENCY MEDICINE

## 2020-07-28 PROCEDURE — 83735 ASSAY OF MAGNESIUM: CPT | Performed by: EMERGENCY MEDICINE

## 2020-07-28 PROCEDURE — 80048 BASIC METABOLIC PNL TOTAL CA: CPT | Performed by: EMERGENCY MEDICINE

## 2020-07-28 RX ORDER — DILTIAZEM HYDROCHLORIDE 5 MG/ML
20 INJECTION INTRAVENOUS ONCE
Status: COMPLETED | OUTPATIENT
Start: 2020-07-28 | End: 2020-07-28

## 2020-07-28 RX ADMIN — DILTIAZEM HYDROCHLORIDE 20 MG: 5 INJECTION INTRAVENOUS at 01:57

## 2020-07-28 RX ADMIN — SODIUM CHLORIDE 1000 ML: 900 INJECTION, SOLUTION INTRAVENOUS at 02:44

## 2020-07-28 NOTE — TELEPHONE ENCOUNTER
Was at [facility] for 6 hrs last night with afib.  Made apt for this Friday. Patient asking if safe to wait until Friday for apt, and if he needs to do anything?

## 2020-07-28 NOTE — ED PROVIDER NOTES
Subjective   Pleasant 70-year-old male with history of paroxysmal atrial fibrillation complains of fast palpitations onset at rest midnight.  No associated symptoms.  Symptoms are moderate, no clear aggravating factors.  Of note, patient did have his rate control switch from metoprolol to diltiazem recently.  Patient states that a switch was made secondary to bad dreams thought to be attributed to the metoprolol.          Review of Systems   Cardiovascular: Positive for palpitations.   All other systems reviewed and are negative.      Past Medical History:   Diagnosis Date   • Atrial fibrillation (CMS/HCC)    • Disease of thyroid gland    • Hyperlipidemia    • Hypertension    • Vertigo        Allergies   Allergen Reactions   • Codeine Paresthesia       Past Surgical History:   Procedure Laterality Date   • ABDOMINAL SURGERY     • COLON SURGERY     • HERNIA REPAIR         Family History   Problem Relation Age of Onset   • No Known Problems Mother    • No Known Problems Father    • Arrhythmia Brother        Social History     Socioeconomic History   • Marital status:      Spouse name: Not on file   • Number of children: Not on file   • Years of education: Not on file   • Highest education level: Not on file   Tobacco Use   • Smoking status: Former Smoker     Last attempt to quit: 2009     Years since quittin.5   • Smokeless tobacco: Never Used   Substance and Sexual Activity   • Alcohol use: No     Frequency: Never   • Drug use: No   • Sexual activity: Defer           Objective   Physical Exam   Constitutional: He is oriented to person, place, and time. He appears well-developed and well-nourished.   HENT:   Head: Normocephalic and atraumatic.   Mouth/Throat: Oropharynx is clear and moist.   Eyes: Pupils are equal, round, and reactive to light. Conjunctivae are normal.   Neck: Normal range of motion. Neck supple.   Cardiovascular:   Irregular, no murmur   Pulmonary/Chest: Effort normal and breath sounds  normal.   Abdominal: Soft. Bowel sounds are normal. He exhibits no distension. There is no tenderness.   Musculoskeletal: Normal range of motion. He exhibits no edema or tenderness.   Neurological: He is alert and oriented to person, place, and time.   Skin: Skin is warm and dry. Capillary refill takes less than 2 seconds.   Psychiatric: He has a normal mood and affect. His behavior is normal.       Procedures           ED Course  ED Course as of Jul 28 0453   Tue Jul 28, 2020   0127 EKG interpretation:  Afib with RVR, occ PVC, no STEMI    [JR]      ED Course User Index  [JR] Modesto Yan MD                                           MDM  Number of Diagnoses or Management Options  Paroxysmal atrial fibrillation (CMS/[facility]):   Diagnosis management comments: Results for orders placed or performed during the hospital encounter of 07/28/20  -Basic Metabolic Panel       Result                      Value             Ref Range           Glucose                     114 (H)           65 - 99 mg/dL       BUN                                                               Creatinine                  0.94              0.76 - 1.27 *       Sodium                      140               136 - 145 mm*       Potassium                   4.1               3.5 - 5.2 mm*       Chloride                    104               98 - 107 mmo*       CO2                         25.0              22.0 - 29.0 *       Calcium                     9.0               8.6 - 10.5 m*       eGFR Non African Amer       79                >60 mL/min/1*       BUN/Creatinine Ratio                                              Anion Gap                   11.0              5.0 - 15.0 m*  -Troponin       Result                      Value             Ref Range           Troponin T                  <0.010            0.000 - 0.03*  -Magnesium       Result                      Value             Ref Range           Magnesium                   1.8               1.6 - 2.4  mg*  -TSH       Result                      Value             Ref Range           TSH                         7.840 (H)         0.270 - 4.20*  -Protime-INR       Result                      Value             Ref Range           Protime                     13.0 (L)          19.4 - 28.5 *       INR                         1.29 (L)          2.00 - 3.00    -CBC Auto Differential       Result                      Value             Ref Range           WBC                         9.00              3.40 - 10.80*       RBC                         4.75              4.14 - 5.80 *       Hemoglobin                  14.4              13.0 - 17.7 *       Hematocrit                  43.6              37.5 - 51.0 %       MCV                         91.7              79.0 - 97.0 *       MCH                         30.3              26.6 - 33.0 *       MCHC                        33.1              31.5 - 35.7 *       RDW                         14.0              12.3 - 15.4 %       RDW-SD                      45.1              37.0 - 54.0 *       MPV                         7.1               6.0 - 12.0 fL       Platelets                   264               140 - 450 10*       Neutrophil %                69.5              42.7 - 76.0 %       Lymphocyte %                17.7 (L)          19.6 - 45.3 %       Monocyte %                  8.7               5.0 - 12.0 %        Eosinophil %                2.8               0.3 - 6.2 %         Basophil %                  1.3               0.0 - 1.5 %         Neutrophils, Absolute       6.30              1.70 - 7.00 *       Lymphocytes, Absolute       1.60              0.70 - 3.10 *       Monocytes, Absolute         0.80              0.10 - 0.90 *       Eosinophils, Absolute       0.20              0.00 - 0.40 *       Basophils, Absolute         0.10              0.00 - 0.20 *       nRBC                        0.1               0.0 - 0.2 /1*  -BUN       Result                      Value              Ref Range           BUN                         15                8 - 23 mg/dL   -Gold Top - SST       Result                      Value             Ref Range           Extra Tube                                                    Hold for add-ons.    Patient well, rate controlled after 1 dose of IV Cardizem.  Patient states he missed 1 dose of his warfarin and will resume his normal regimen follow-up closely with his cardiologist.       Amount and/or Complexity of Data Reviewed  Clinical lab tests: reviewed  Tests in the radiology section of CPT®: reviewed        Final diagnoses:   Paroxysmal atrial fibrillation (CMS/HCC)            Modesto Yan MD  07/28/20 0453

## 2020-07-31 ENCOUNTER — OFFICE VISIT (OUTPATIENT)
Dept: CARDIOLOGY | Facility: CLINIC | Age: 70
End: 2020-07-31

## 2020-07-31 VITALS
DIASTOLIC BLOOD PRESSURE: 86 MMHG | HEIGHT: 63 IN | WEIGHT: 249 LBS | BODY MASS INDEX: 44.12 KG/M2 | OXYGEN SATURATION: 97 % | HEART RATE: 66 BPM | SYSTOLIC BLOOD PRESSURE: 142 MMHG

## 2020-07-31 DIAGNOSIS — I10 ESSENTIAL HYPERTENSION: Primary | Chronic | ICD-10-CM

## 2020-07-31 DIAGNOSIS — I48.91 ATRIAL FIBRILLATION WITH RAPID VENTRICULAR RESPONSE (HCC): ICD-10-CM

## 2020-07-31 PROCEDURE — 93000 ELECTROCARDIOGRAM COMPLETE: CPT | Performed by: INTERNAL MEDICINE

## 2020-07-31 PROCEDURE — 99213 OFFICE O/P EST LOW 20 MIN: CPT | Performed by: INTERNAL MEDICINE

## 2020-08-06 RX ORDER — WARFARIN SODIUM 5 MG/1
TABLET ORAL
Qty: 45 TABLET | Refills: 0 | Status: SHIPPED | OUTPATIENT
Start: 2020-08-06 | End: 2020-09-01

## 2020-08-06 NOTE — TELEPHONE ENCOUNTER
Called patient, left message on his machine, patient had low INR 1.29 at hospital, needs apt to check INR so dose can be adjusted.

## 2020-08-07 ENCOUNTER — ANTICOAGULATION VISIT (OUTPATIENT)
Dept: CARDIOLOGY | Facility: CLINIC | Age: 70
End: 2020-08-07

## 2020-08-07 VITALS
HEART RATE: 54 BPM | DIASTOLIC BLOOD PRESSURE: 81 MMHG | SYSTOLIC BLOOD PRESSURE: 142 MMHG | WEIGHT: 250.5 LBS | BODY MASS INDEX: 44.37 KG/M2

## 2020-08-07 DIAGNOSIS — Z79.01 LONG TERM (CURRENT) USE OF ANTICOAGULANTS: ICD-10-CM

## 2020-08-07 DIAGNOSIS — I48.0 PAROXYSMAL ATRIAL FIBRILLATION (HCC): ICD-10-CM

## 2020-08-07 LAB — INR PPP: 1.9 (ref 0.9–1.1)

## 2020-08-07 PROCEDURE — 36416 COLLJ CAPILLARY BLOOD SPEC: CPT | Performed by: INTERNAL MEDICINE

## 2020-08-07 PROCEDURE — 85610 PROTHROMBIN TIME: CPT | Performed by: INTERNAL MEDICINE

## 2020-08-13 ENCOUNTER — TELEPHONE (OUTPATIENT)
Dept: CARDIOLOGY | Facility: CLINIC | Age: 70
End: 2020-08-13

## 2020-08-13 NOTE — TELEPHONE ENCOUNTER
PCP asked patient to call and make sure Dr. Oswald is ok with him taking meloxican for his muscle pains.

## 2020-08-13 NOTE — TELEPHONE ENCOUNTER
Meloxicam can cause stomach problems so patient has to be careful  If there is no other alternative he needs to check with PCP regarding taking something like Protonix for stomach protection

## 2020-08-14 NOTE — TELEPHONE ENCOUNTER
Called and advsd info per Dr. Gunter  Pt verbally understood and stated he isn't going to take the meds.

## 2020-08-17 ENCOUNTER — LAB (OUTPATIENT)
Dept: LAB | Facility: HOSPITAL | Age: 70
End: 2020-08-17

## 2020-08-17 ENCOUNTER — TRANSCRIBE ORDERS (OUTPATIENT)
Dept: ADMINISTRATIVE | Facility: HOSPITAL | Age: 70
End: 2020-08-17

## 2020-08-17 DIAGNOSIS — M79.10 MUSCLE ACHE: Primary | ICD-10-CM

## 2020-08-17 DIAGNOSIS — M79.10 MUSCLE ACHE: ICD-10-CM

## 2020-08-17 LAB
25(OH)D3 SERPL-MCNC: 21.7 NG/ML (ref 30–100)
ALBUMIN SERPL-MCNC: 4 G/DL (ref 3.5–5.2)
ALBUMIN/GLOB SERPL: 1.3 G/DL
ALP SERPL-CCNC: 83 U/L (ref 39–117)
ALT SERPL W P-5'-P-CCNC: 24 U/L (ref 1–41)
ANION GAP SERPL CALCULATED.3IONS-SCNC: 7.3 MMOL/L (ref 5–15)
AST SERPL-CCNC: 22 U/L (ref 1–40)
BILIRUB SERPL-MCNC: 0.4 MG/DL (ref 0–1.2)
BUN SERPL-MCNC: 13 MG/DL (ref 8–23)
BUN/CREAT SERPL: 14.3 (ref 7–25)
CALCIUM SPEC-SCNC: 9.5 MG/DL (ref 8.6–10.5)
CHLORIDE SERPL-SCNC: 100 MMOL/L (ref 98–107)
CHROMATIN AB SERPL-ACNC: <10 IU/ML (ref 0–14)
CO2 SERPL-SCNC: 30.7 MMOL/L (ref 22–29)
CREAT SERPL-MCNC: 0.91 MG/DL (ref 0.76–1.27)
GFR SERPL CREATININE-BSD FRML MDRD: 82 ML/MIN/1.73
GLOBULIN UR ELPH-MCNC: 3.1 GM/DL
GLUCOSE SERPL-MCNC: 92 MG/DL (ref 65–99)
POTASSIUM SERPL-SCNC: 4.7 MMOL/L (ref 3.5–5.2)
PROT SERPL-MCNC: 7.1 G/DL (ref 6–8.5)
SODIUM SERPL-SCNC: 138 MMOL/L (ref 136–145)
VIT B12 BLD-MCNC: <150 PG/ML (ref 211–946)

## 2020-08-17 PROCEDURE — 86431 RHEUMATOID FACTOR QUANT: CPT

## 2020-08-17 PROCEDURE — 86038 ANTINUCLEAR ANTIBODIES: CPT

## 2020-08-17 PROCEDURE — 82306 VITAMIN D 25 HYDROXY: CPT

## 2020-08-17 PROCEDURE — 82607 VITAMIN B-12: CPT

## 2020-08-17 PROCEDURE — 36415 COLL VENOUS BLD VENIPUNCTURE: CPT

## 2020-08-17 PROCEDURE — 84207 ASSAY OF VITAMIN B-6: CPT

## 2020-08-17 PROCEDURE — 80053 COMPREHEN METABOLIC PANEL: CPT

## 2020-08-19 LAB — ANA SER QL: NEGATIVE

## 2020-08-20 LAB — VIT B6 SERPL-MCNC: 8.8 UG/L (ref 5.3–46.7)

## 2020-09-01 RX ORDER — WARFARIN SODIUM 5 MG/1
TABLET ORAL
Qty: 45 TABLET | Refills: 0 | Status: SHIPPED | OUTPATIENT
Start: 2020-09-01 | End: 2020-09-28

## 2020-09-04 ENCOUNTER — ANTICOAGULATION VISIT (OUTPATIENT)
Dept: CARDIOLOGY | Facility: CLINIC | Age: 70
End: 2020-09-04

## 2020-09-04 VITALS
SYSTOLIC BLOOD PRESSURE: 155 MMHG | HEART RATE: 57 BPM | BODY MASS INDEX: 44.99 KG/M2 | DIASTOLIC BLOOD PRESSURE: 85 MMHG | WEIGHT: 254 LBS

## 2020-09-04 DIAGNOSIS — Z79.01 LONG TERM (CURRENT) USE OF ANTICOAGULANTS: ICD-10-CM

## 2020-09-04 DIAGNOSIS — I48.0 PAROXYSMAL ATRIAL FIBRILLATION (HCC): ICD-10-CM

## 2020-09-04 LAB — INR PPP: 2.1 (ref 0.9–1.1)

## 2020-09-04 PROCEDURE — 36416 COLLJ CAPILLARY BLOOD SPEC: CPT | Performed by: INTERNAL MEDICINE

## 2020-09-04 PROCEDURE — 85610 PROTHROMBIN TIME: CPT | Performed by: INTERNAL MEDICINE

## 2020-09-27 ENCOUNTER — HOSPITAL ENCOUNTER (EMERGENCY)
Facility: HOSPITAL | Age: 70
Discharge: HOME | End: 2020-09-27
Attending: EMERGENCY MEDICINE | Admitting: EMERGENCY MEDICINE

## 2020-09-27 VITALS
BODY MASS INDEX: 30.98 KG/M2 | TEMPERATURE: 97.6 F | DIASTOLIC BLOOD PRESSURE: 63 MMHG | HEART RATE: 95 BPM | OXYGEN SATURATION: 95 % | RESPIRATION RATE: 16 BRPM | SYSTOLIC BLOOD PRESSURE: 100 MMHG | HEIGHT: 75 IN | WEIGHT: 249.12 LBS

## 2020-09-27 DIAGNOSIS — I48.91 ATRIAL FIBRILLATION WITH RVR (HCC): Primary | ICD-10-CM

## 2020-09-27 LAB
ANION GAP SERPL CALCULATED.3IONS-SCNC: 9 MMOL/L (ref 5–15)
BUN SERPL-MCNC: 18 MG/DL (ref 8–23)
BUN SERPL-MCNC: ABNORMAL MG/DL
BUN/CREAT SERPL: ABNORMAL
CALCIUM SPEC-SCNC: 8.8 MG/DL (ref 8.6–10.5)
CHLORIDE SERPL-SCNC: 104 MMOL/L (ref 98–107)
CO2 SERPL-SCNC: 27 MMOL/L (ref 22–29)
CREAT SERPL-MCNC: 1.06 MG/DL (ref 0.76–1.27)
GFR SERPL CREATININE-BSD FRML MDRD: 69 ML/MIN/1.73
GLUCOSE SERPL-MCNC: 117 MG/DL (ref 65–99)
INR PPP: 1.55 (ref 2–3)
POTASSIUM SERPL-SCNC: 3.9 MMOL/L (ref 3.5–5.2)
PROTHROMBIN TIME: 16.7 SECONDS (ref 19.4–28.5)
SODIUM SERPL-SCNC: 140 MMOL/L (ref 136–145)
WHOLE BLOOD HOLD SPECIMEN: NORMAL

## 2020-09-27 PROCEDURE — 93005 ELECTROCARDIOGRAM TRACING: CPT | Performed by: EMERGENCY MEDICINE

## 2020-09-27 PROCEDURE — 96376 TX/PRO/DX INJ SAME DRUG ADON: CPT

## 2020-09-27 PROCEDURE — 93005 ELECTROCARDIOGRAM TRACING: CPT

## 2020-09-27 PROCEDURE — 96374 THER/PROPH/DIAG INJ IV PUSH: CPT

## 2020-09-27 PROCEDURE — 99283 EMERGENCY DEPT VISIT LOW MDM: CPT

## 2020-09-27 PROCEDURE — 80048 BASIC METABOLIC PNL TOTAL CA: CPT | Performed by: EMERGENCY MEDICINE

## 2020-09-27 PROCEDURE — 85610 PROTHROMBIN TIME: CPT | Performed by: EMERGENCY MEDICINE

## 2020-09-27 RX ORDER — SODIUM CHLORIDE 0.9 % (FLUSH) 0.9 %
10 SYRINGE (ML) INJECTION AS NEEDED
Status: DISCONTINUED | OUTPATIENT
Start: 2020-09-27 | End: 2020-09-27 | Stop reason: HOSPADM

## 2020-09-27 RX ORDER — METOPROLOL TARTRATE 5 MG/5ML
5 INJECTION INTRAVENOUS ONCE
Status: COMPLETED | OUTPATIENT
Start: 2020-09-27 | End: 2020-09-27

## 2020-09-27 RX ADMIN — METOPROLOL TARTRATE 5 MG: 1 INJECTION, SOLUTION INTRAVENOUS at 18:35

## 2020-09-27 RX ADMIN — METOPROLOL TARTRATE 5 MG: 1 INJECTION, SOLUTION INTRAVENOUS at 17:36

## 2020-09-28 RX ORDER — WARFARIN SODIUM 5 MG/1
TABLET ORAL
Qty: 45 TABLET | Refills: 2 | Status: SHIPPED | OUTPATIENT
Start: 2020-09-28 | End: 2020-11-01 | Stop reason: SDUPTHER

## 2020-09-30 ENCOUNTER — TRANSCRIBE ORDERS (OUTPATIENT)
Dept: PHYSICAL THERAPY | Facility: CLINIC | Age: 70
End: 2020-09-30

## 2020-09-30 DIAGNOSIS — M25.511 RIGHT SHOULDER PAIN, UNSPECIFIED CHRONICITY: Primary | ICD-10-CM

## 2020-10-05 ENCOUNTER — TREATMENT (OUTPATIENT)
Dept: PHYSICAL THERAPY | Facility: CLINIC | Age: 70
End: 2020-10-05

## 2020-10-05 DIAGNOSIS — M25.511 RIGHT SHOULDER PAIN, UNSPECIFIED CHRONICITY: Primary | ICD-10-CM

## 2020-10-05 PROCEDURE — 97110 THERAPEUTIC EXERCISES: CPT | Performed by: PHYSICAL THERAPIST

## 2020-10-05 PROCEDURE — 97162 PT EVAL MOD COMPLEX 30 MIN: CPT | Performed by: PHYSICAL THERAPIST

## 2020-10-05 NOTE — PROGRESS NOTES
"  Physical Therapy Initial Evaluation and Plan of Care    Patient: Filipe Altamirano   : 1950  Diagnosis/ICD-10 Code:  Right shoulder pain, unspecified chronicity [M25.511]  Referring practitioner: Modesto De Dios MD  Date of Initial Visit: 10/5/2020  Today's Date: 10/5/2020  Patient seen for 1 sessions           Subjective Questionnaire: QuickDASH:  23%      Subjective Evaluation    History of Present Illness  Mechanism of injury: Patient presents to physical therapy with cc of right shoulder pain.  Reports that he was picking up a 50# bag of corn and loading it into a shopping cart when he felt a \"pop\", which occurred last Monday.  Reports seeing MD and having x-rays which were negative.  Reports pain in top of shoulder and feels it into right bicep.  Denies [employer].  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  Wishes to have less pain in right shoulder.     Pain  Current pain rating: 3  Quality: dull ache  Relieving factors: medications  Aggravating factors: overhead activity, keyboarding, lifting and outstretched reach  Progression: no change    Hand dominance: right    Diagnostic Tests  X-ray: normal    Patient Goals  Patient goals for therapy: decreased pain, increased motion, increased strength and return to sport/leisure activities             Objective          Tenderness     Right Shoulder  Tenderness in the biceps tendon (proximal) and infraspinatus tendon.     Neurological Testing     Sensation     Shoulder   Left Shoulder   Intact: light touch    Right Shoulder   Intact: light touch    Active Range of Motion   Left Shoulder   Flexion: 150 degrees   Abduction: 145 degrees   External rotation BTH: C7   Internal rotation BTB: T10     Right Shoulder   Flexion: 150 degrees   Abduction: 135 degrees   External rotation BTH: T2   Internal rotation BTB: T10     Strength/Myotome Testing     Left Shoulder   Normal muscle strength    Right " Shoulder     Planes of Motion   Flexion: 5   Abduction: 4+   External rotation at 0°: 4+   Internal rotation at 0°: 4+     Isolated Muscles   Biceps: 5   Supraspinatus: 4+   Triceps: 5     Tests     Right Shoulder   Positive empty can and painful arc.   Negative Speed's and Yergason's.           Assessment & Plan     Assessment  Impairments: abnormal or restricted ROM, impaired physical strength, lacks appropriate home exercise program and pain with function  Assessment details: Patient presents to physical therapy with s/s congruent with MD diagnosis of right shoulder pain.  Patient demonstrates mild weakness in right shoulder, pain with resisted testing, and mild deficits in AROM in right shoulder.  Mild postural deficits noted as well (forward head, rounded shoulders).  Patient would benefit from PT intervention in order to address these deficits so that he may complete work and ADL activities without pain or limitation.   Prognosis: good  Functional Limitations: carrying objects, lifting, uncomfortable because of pain and reaching overhead  Goals  Plan Goals: In two weeks, patient will report at least 25% reduction in pain level.    In two weeks, patient will demonstrate at least 50% improvement in AROM in right shoulder.     In four weeks, patient will demonstrate 5/5 muscular strength in R shoulder.   In four weeks, patient will demonstrate full AROM in right shoulder without pain.   In four weeks, patient will demonstrate proper technique with HEP.   In four weeks, patient will demonstrate decreased perceived disability by decreasing score on QuickDASH by at least 12%.     Plan  Therapy options: will be seen for skilled physical therapy services  Planned modality interventions: thermotherapy (hydrocollator packs), TENS and cryotherapy  Planned therapy interventions: manual therapy, neuromuscular re-education, soft tissue mobilization, strengthening, stretching, joint mobilization, home exercise program and  functional ROM exercises  Duration in visits: 20  Plan details: 1-3 times per week        Manual Therapy:         mins  67710;  Therapeutic Exercise:    25     mins  02273;     Neuromuscular Malou:        mins  78403;    Therapeutic Activity:          mins  75445;     Gait Training:           mins  34149;     Ultrasound:          mins  25546;    Electrical Stimulation:         mins  46257 ( );  Dry Needling          mins self-pay    Timed Treatment:   25   mins   Total Treatment:     45   mins    PT SIGNATURE: Gianfranco Han, EVELIA   DATE TREATMENT INITIATED: 10/5/2020    Initial Certification  Certification Period: 1/3/2021  I certify that the therapy services are furnished while this patient is under my care.  The services outlined above are required by this patient, and will be reviewed every 90 days.     PHYSICIAN: Modesto De Dios MD      DATE:     Please sign and return via fax to [phone].. Thank you, [facility].

## 2020-10-13 ENCOUNTER — ANTICOAGULATION VISIT (OUTPATIENT)
Dept: CARDIOLOGY | Facility: CLINIC | Age: 70
End: 2020-10-13

## 2020-10-13 VITALS
DIASTOLIC BLOOD PRESSURE: 70 MMHG | HEART RATE: 54 BPM | BODY MASS INDEX: 32 KG/M2 | WEIGHT: 256 LBS | SYSTOLIC BLOOD PRESSURE: 136 MMHG

## 2020-10-13 DIAGNOSIS — I48.0 PAROXYSMAL ATRIAL FIBRILLATION (HCC): ICD-10-CM

## 2020-10-13 DIAGNOSIS — Z79.01 LONG TERM (CURRENT) USE OF ANTICOAGULANTS: ICD-10-CM

## 2020-10-13 LAB — INR PPP: 1.8 (ref 0.9–1.1)

## 2020-10-13 PROCEDURE — 36416 COLLJ CAPILLARY BLOOD SPEC: CPT | Performed by: INTERNAL MEDICINE

## 2020-10-13 PROCEDURE — 85610 PROTHROMBIN TIME: CPT | Performed by: INTERNAL MEDICINE

## 2020-10-29 ENCOUNTER — LAB (OUTPATIENT)
Dept: LAB | Facility: HOSPITAL | Age: 70
End: 2020-10-29

## 2020-10-29 DIAGNOSIS — E03.9 HYPOTHYROIDISM (ACQUIRED): ICD-10-CM

## 2020-10-29 DIAGNOSIS — I10 ESSENTIAL HYPERTENSION: Chronic | ICD-10-CM

## 2020-10-29 DIAGNOSIS — I48.91 ATRIAL FIBRILLATION WITH RAPID VENTRICULAR RESPONSE (HCC): ICD-10-CM

## 2020-10-29 DIAGNOSIS — Z79.01 LONG TERM (CURRENT) USE OF ANTICOAGULANTS: ICD-10-CM

## 2020-10-29 LAB
ALBUMIN SERPL-MCNC: 4.6 G/DL (ref 3.5–5.2)
ALBUMIN/GLOB SERPL: 1.8 G/DL
ALP SERPL-CCNC: 81 U/L (ref 39–117)
ALT SERPL W P-5'-P-CCNC: 32 U/L (ref 1–41)
ANION GAP SERPL CALCULATED.3IONS-SCNC: 7.9 MMOL/L (ref 5–15)
AST SERPL-CCNC: 28 U/L (ref 1–40)
BILIRUB SERPL-MCNC: 0.5 MG/DL (ref 0–1.2)
BUN SERPL-MCNC: 15 MG/DL (ref 8–23)
BUN/CREAT SERPL: 15.2 (ref 7–25)
CALCIUM SPEC-SCNC: 9.6 MG/DL (ref 8.6–10.5)
CHLORIDE SERPL-SCNC: 101 MMOL/L (ref 98–107)
CHOLEST SERPL-MCNC: 200 MG/DL (ref 0–200)
CK SERPL-CCNC: 120 U/L (ref 20–200)
CO2 SERPL-SCNC: 31.1 MMOL/L (ref 22–29)
CREAT SERPL-MCNC: 0.99 MG/DL (ref 0.76–1.27)
GFR SERPL CREATININE-BSD FRML MDRD: 74 ML/MIN/1.73
GLOBULIN UR ELPH-MCNC: 2.5 GM/DL
GLUCOSE SERPL-MCNC: 78 MG/DL (ref 65–99)
HDLC SERPL-MCNC: 47 MG/DL (ref 40–60)
LDLC SERPL CALC-MCNC: 111 MG/DL (ref 0–100)
LDLC/HDLC SERPL: 2.23 {RATIO}
POTASSIUM SERPL-SCNC: 4.9 MMOL/L (ref 3.5–5.2)
PROT SERPL-MCNC: 7.1 G/DL (ref 6–8.5)
SODIUM SERPL-SCNC: 140 MMOL/L (ref 136–145)
TRIGL SERPL-MCNC: 242 MG/DL (ref 0–150)
TSH SERPL DL<=0.05 MIU/L-ACNC: 5.34 UIU/ML (ref 0.27–4.2)
VLDLC SERPL-MCNC: 42 MG/DL (ref 5–40)

## 2020-10-29 PROCEDURE — 80053 COMPREHEN METABOLIC PANEL: CPT

## 2020-10-29 PROCEDURE — 80061 LIPID PANEL: CPT

## 2020-10-29 PROCEDURE — 36415 COLL VENOUS BLD VENIPUNCTURE: CPT

## 2020-10-29 PROCEDURE — 84443 ASSAY THYROID STIM HORMONE: CPT

## 2020-10-29 PROCEDURE — 82550 ASSAY OF CK (CPK): CPT

## 2020-11-02 ENCOUNTER — OFFICE VISIT (OUTPATIENT)
Dept: CARDIOLOGY | Facility: CLINIC | Age: 70
End: 2020-11-02

## 2020-11-02 VITALS
BODY MASS INDEX: 32.95 KG/M2 | SYSTOLIC BLOOD PRESSURE: 143 MMHG | HEART RATE: 57 BPM | DIASTOLIC BLOOD PRESSURE: 86 MMHG | TEMPERATURE: 97.6 F | HEIGHT: 75 IN | OXYGEN SATURATION: 97 % | WEIGHT: 265 LBS

## 2020-11-02 DIAGNOSIS — E78.2 MIXED HYPERLIPIDEMIA: ICD-10-CM

## 2020-11-02 DIAGNOSIS — I10 ESSENTIAL HYPERTENSION: Primary | Chronic | ICD-10-CM

## 2020-11-02 DIAGNOSIS — E03.9 HYPOTHYROIDISM (ACQUIRED): ICD-10-CM

## 2020-11-02 DIAGNOSIS — Z79.01 LONG TERM (CURRENT) USE OF ANTICOAGULANTS: ICD-10-CM

## 2020-11-02 DIAGNOSIS — I48.0 PAROXYSMAL ATRIAL FIBRILLATION (HCC): ICD-10-CM

## 2020-11-02 PROCEDURE — 99214 OFFICE O/P EST MOD 30 MIN: CPT | Performed by: INTERNAL MEDICINE

## 2020-11-02 RX ORDER — ACETAMINOPHEN 160 MG
TABLET,DISINTEGRATING ORAL
COMMUNITY

## 2020-11-02 NOTE — PROGRESS NOTES
"    Subjective:     Encounter Date:11/02/2020      Patient ID: Filipe Altamirano is a 70 y.o. male.    Chief Complaint: HTN, AFIB f/u w labs  History of Present Illness     70-year-old white male patient with previous history of hypertension dyslipidemia history of hypothyroidism no cardiac issues had a negative stress test November 2019      Patient was admitted beginning of 2020 with new onset atrial fibrillation        Patient subsequently converted into sinus rhythm somewhat spontaneously     Patient is staying in sinus rhythm without any further symptoms denies of any chest pain shortness of breath syncopal episodes lower extremity edema PND orthopnea  Patient is currently taking  beta-blockers and warfarin     March 2020 echocardiogram EF 55% mild to moderate aortic insufficiency mild mitral insufficiency    Patient tried to stop the beta-blockers but he went back into atrial fibrillation so he restarted beta-blocker and now doing well     Recent labs TSH mildly elevated so advised him to follow-up with PCP regarding Synthroid dosing his lipids showed elevated triglycerides mildly elevated LDL he would like to diet control  Follow-up in 6 months with labs and EKG      The following portions of the patient's history were reviewed and updated as appropriate: Allergies current medications past family history past medical history past social history past surgical history problem list and review of systems  Past Medical History:   Diagnosis Date   • Atrial fibrillation (CMS/HCC)    • Disease of thyroid gland    • Hyperlipidemia    • Hypertension    • Vertigo      Past Surgical History:   Procedure Laterality Date   • ABDOMINAL SURGERY     • COLON SURGERY     • HERNIA REPAIR       /86 (BP Location: Left arm, Patient Position: Sitting, Cuff Size: Large Adult)   Pulse 57   Temp 97.6 °F (36.4 °C) (Infrared)   Ht 190.5 cm (75\")   Wt 120 kg (265 lb)   SpO2 97%   BMI 33.12 kg/m²   Family History   Problem " Relation Age of Onset   • No Known Problems Mother    • No Known Problems Father    • Arrhythmia Brother        Current Outpatient Medications:   •  Cholecalciferol (Vitamin D3) 50 MCG (2000 UT) capsule, Vitamin D3 50 mcg (2,000 unit) capsule  Take 3 capsules by mouth once daily X 8 weeks. Then take 1 capsule once daily by mouth X 4 weeks., Disp: , Rfl:   •  Cyanocobalamin 1000 MCG/ML kit, 1 mL., Disp: , Rfl:   •  levothyroxine (SYNTHROID, LEVOTHROID) 25 MCG tablet, levothyroxine 25 mcg tablet  TAKE ONE and 1/2 TABLETS BY MOUTH DAILY for a total of 37.5 mcg daily., Disp: , Rfl:   •  losartan (COZAAR) 25 MG tablet, losartan 25 mg tablet  Take 1 tablet every day by oral route., Disp: , Rfl:   •  metoprolol tartrate (LOPRESSOR) 25 MG tablet, Take 25 mg by mouth 3 (Three) Times a Day., Disp: , Rfl:   •  warfarin (COUMADIN) 5 MG tablet, warfarin 5 mg tablet  Take 1 tablet every day by oral route., Disp: , Rfl:   •  dilTIAZem CD (Cartia XT) 120 MG 24 hr capsule, Cartia  mg capsule,extended release, Disp: , Rfl:   •  HYDROcodone-acetaminophen (NORCO) 5-325 MG per tablet, , Disp: , Rfl:   •  meloxicam (MOBIC) 15 MG tablet, meloxicam 15 mg tablet  Take 1 tablet every day by oral route for 30 days., Disp: , Rfl:   •  methylPREDNISolone (MEDROL) 4 MG dose pack, , Disp: , Rfl:   Social History     Socioeconomic History   • Marital status:      Spouse name: Not on file   • Number of children: Not on file   • Years of education: Not on file   • Highest education level: Not on file   Tobacco Use   • Smoking status: Former Smoker     Quit date:      Years since quittin.8   • Smokeless tobacco: Never Used   Substance and Sexual Activity   • Alcohol use: No     Frequency: Never   • Drug use: No   • Sexual activity: Defer     Allergies   Allergen Reactions   • Codeine Paresthesia     Other reaction(s): Unknown   • Diltiazem Arrhythmia     Racing heart, sleep issues     Review of Systems   Constitution: Negative  for chills, fever and malaise/fatigue.   HENT: Negative for congestion and hearing loss.    Eyes: Negative for double vision and visual disturbance.   Cardiovascular: Negative for chest pain, claudication, dyspnea on exertion, leg swelling, palpitations and syncope.   Respiratory: Negative for cough and shortness of breath.    Endocrine: Negative for cold intolerance.   Skin: Negative for color change and rash.   Musculoskeletal: Negative for arthritis and joint pain.   Gastrointestinal: Negative for abdominal pain and heartburn.   Genitourinary: Negative for hematuria.   Neurological: Negative for excessive daytime sleepiness, dizziness, light-headedness and numbness.   Psychiatric/Behavioral: Negative for depression. The patient is not nervous/anxious.    All other systems reviewed and are negative.             Objective:     Constitutional:       Appearance: Well-developed.   Eyes:      General: No scleral icterus.     Conjunctiva/sclera: Conjunctivae normal.   HENT:      Head: Normocephalic and atraumatic.    Mouth/Throat:      Mouth: No oral lesions.      Pharynx: Uvula midline.   Neck:      Musculoskeletal: Neck supple.      Thyroid: No thyromegaly.      Vascular: No carotid bruit or JVD.      Trachea: Trachea normal.   Pulmonary:      Effort: Pulmonary effort is normal.      Breath sounds: Normal breath sounds.   Cardiovascular:      Normal rate. Regular rhythm.      No gallop.   Pulses:     Intact distal pulses.   Abdominal:      General: Bowel sounds are normal.      Palpations: Abdomen is soft.   Musculoskeletal: Normal range of motion.   Skin:     General: Skin is warm. There is no cyanosis.   Neurological:      Mental Status: Alert and oriented to person, place, and time.      Comments: No focal deficits   Psychiatric:         Behavior: Behavior is cooperative.         Procedures    Lab Review:       Assessment:          Diagnosis Plan   1. Essential hypertension  Comprehensive Metabolic Panel    Lipid  Panel   2. Paroxysmal atrial fibrillation (CMS/HCC)  Comprehensive Metabolic Panel    Lipid Panel   3. Hypothyroidism (acquired)  Comprehensive Metabolic Panel    Lipid Panel   4. Long term (current) use of anticoagulants  Comprehensive Metabolic Panel    Lipid Panel   5. Mixed hyperlipidemia  Comprehensive Metabolic Panel    Lipid Panel          Plan:       Continue aggressive control of hypertension dyslipidemia modify cardiac risk factors aggressively  Patient will continue diet control  Paroxysmal atrial fibrillation staying in sinus rhythm  Hypothyroidism dose needs to be adjusted will be followed by PCP  Continue checking INR regularly

## 2020-11-24 ENCOUNTER — ANTICOAGULATION VISIT (OUTPATIENT)
Dept: CARDIOLOGY | Facility: CLINIC | Age: 70
End: 2020-11-24

## 2020-11-24 VITALS
DIASTOLIC BLOOD PRESSURE: 62 MMHG | HEART RATE: 59 BPM | TEMPERATURE: 97.8 F | SYSTOLIC BLOOD PRESSURE: 138 MMHG | BODY MASS INDEX: 32.5 KG/M2 | WEIGHT: 260 LBS

## 2020-11-24 DIAGNOSIS — Z79.01 LONG TERM (CURRENT) USE OF ANTICOAGULANTS: ICD-10-CM

## 2020-11-24 DIAGNOSIS — I48.0 PAROXYSMAL ATRIAL FIBRILLATION (HCC): ICD-10-CM

## 2020-11-24 LAB — INR PPP: 1.9 (ref 0.9–1.1)

## 2020-11-24 PROCEDURE — 36416 COLLJ CAPILLARY BLOOD SPEC: CPT | Performed by: INTERNAL MEDICINE

## 2020-11-24 PROCEDURE — 85610 PROTHROMBIN TIME: CPT | Performed by: INTERNAL MEDICINE

## 2020-12-23 RX ORDER — WARFARIN SODIUM 5 MG/1
TABLET ORAL
Qty: 45 TABLET | Refills: 2 | Status: SHIPPED | OUTPATIENT
Start: 2020-12-23 | End: 2021-03-19

## 2021-01-13 ENCOUNTER — ANTICOAGULATION VISIT (OUTPATIENT)
Dept: CARDIOLOGY | Facility: CLINIC | Age: 71
End: 2021-01-13

## 2021-01-13 VITALS
SYSTOLIC BLOOD PRESSURE: 157 MMHG | BODY MASS INDEX: 34.5 KG/M2 | WEIGHT: 276 LBS | HEART RATE: 59 BPM | DIASTOLIC BLOOD PRESSURE: 84 MMHG

## 2021-01-13 DIAGNOSIS — I48.0 PAROXYSMAL ATRIAL FIBRILLATION (HCC): ICD-10-CM

## 2021-01-13 DIAGNOSIS — Z79.01 LONG TERM (CURRENT) USE OF ANTICOAGULANTS: ICD-10-CM

## 2021-01-13 LAB — INR PPP: 1.6 (ref 0.9–1.1)

## 2021-01-13 PROCEDURE — 36416 COLLJ CAPILLARY BLOOD SPEC: CPT | Performed by: INTERNAL MEDICINE

## 2021-01-13 PROCEDURE — 85610 PROTHROMBIN TIME: CPT | Performed by: INTERNAL MEDICINE

## 2021-01-18 RX ORDER — LOSARTAN POTASSIUM 25 MG/1
25 TABLET ORAL DAILY
Qty: 90 TABLET | Refills: 2 | Status: SHIPPED | OUTPATIENT
Start: 2021-01-18

## 2021-02-25 ENCOUNTER — ANTICOAGULATION VISIT (OUTPATIENT)
Dept: CARDIOLOGY | Facility: CLINIC | Age: 71
End: 2021-02-25

## 2021-02-25 VITALS — SYSTOLIC BLOOD PRESSURE: 131 MMHG | HEART RATE: 67 BPM | DIASTOLIC BLOOD PRESSURE: 70 MMHG | TEMPERATURE: 96.8 F

## 2021-02-25 DIAGNOSIS — Z79.01 LONG TERM (CURRENT) USE OF ANTICOAGULANTS: ICD-10-CM

## 2021-02-25 DIAGNOSIS — I48.0 PAROXYSMAL ATRIAL FIBRILLATION (HCC): ICD-10-CM

## 2021-02-25 LAB — INR PPP: 1.8 (ref 0.9–1.1)

## 2021-02-25 PROCEDURE — 85610 PROTHROMBIN TIME: CPT | Performed by: INTERNAL MEDICINE

## 2021-02-25 PROCEDURE — 36416 COLLJ CAPILLARY BLOOD SPEC: CPT | Performed by: INTERNAL MEDICINE

## 2021-03-19 DIAGNOSIS — Z79.01 LONG TERM (CURRENT) USE OF ANTICOAGULANTS: Primary | ICD-10-CM

## 2021-03-19 DIAGNOSIS — I48.91 ATRIAL FIBRILLATION WITH RAPID VENTRICULAR RESPONSE (HCC): ICD-10-CM

## 2021-03-19 RX ORDER — WARFARIN SODIUM 5 MG/1
TABLET ORAL
Qty: 54 TABLET | Refills: 1 | Status: SHIPPED | OUTPATIENT
Start: 2021-03-19 | End: 2021-05-14

## 2021-04-09 ENCOUNTER — ANTICOAGULATION VISIT (OUTPATIENT)
Dept: CARDIOLOGY | Facility: CLINIC | Age: 71
End: 2021-04-09

## 2021-04-09 VITALS
SYSTOLIC BLOOD PRESSURE: 145 MMHG | WEIGHT: 256 LBS | BODY MASS INDEX: 32 KG/M2 | DIASTOLIC BLOOD PRESSURE: 72 MMHG | HEART RATE: 65 BPM

## 2021-04-09 DIAGNOSIS — Z79.01 LONG TERM (CURRENT) USE OF ANTICOAGULANTS: ICD-10-CM

## 2021-04-09 DIAGNOSIS — I48.0 PAROXYSMAL ATRIAL FIBRILLATION (HCC): ICD-10-CM

## 2021-04-09 LAB — INR PPP: 2.7 (ref 0.9–1.1)

## 2021-04-09 PROCEDURE — 85610 PROTHROMBIN TIME: CPT | Performed by: INTERNAL MEDICINE

## 2021-04-09 PROCEDURE — 36416 COLLJ CAPILLARY BLOOD SPEC: CPT | Performed by: INTERNAL MEDICINE

## 2021-05-07 ENCOUNTER — ANTICOAGULATION VISIT (OUTPATIENT)
Dept: CARDIOLOGY | Facility: CLINIC | Age: 71
End: 2021-05-07

## 2021-05-07 ENCOUNTER — OFFICE VISIT (OUTPATIENT)
Dept: CARDIOLOGY | Facility: CLINIC | Age: 71
End: 2021-05-07

## 2021-05-07 VITALS
BODY MASS INDEX: 34.32 KG/M2 | SYSTOLIC BLOOD PRESSURE: 126 MMHG | HEIGHT: 75 IN | WEIGHT: 276 LBS | OXYGEN SATURATION: 96 % | HEART RATE: 65 BPM | DIASTOLIC BLOOD PRESSURE: 78 MMHG

## 2021-05-07 VITALS
WEIGHT: 276 LBS | BODY MASS INDEX: 34.5 KG/M2 | DIASTOLIC BLOOD PRESSURE: 78 MMHG | HEART RATE: 65 BPM | SYSTOLIC BLOOD PRESSURE: 126 MMHG

## 2021-05-07 DIAGNOSIS — I48.0 PAROXYSMAL ATRIAL FIBRILLATION (HCC): ICD-10-CM

## 2021-05-07 DIAGNOSIS — Z79.01 LONG TERM (CURRENT) USE OF ANTICOAGULANTS: Primary | ICD-10-CM

## 2021-05-07 DIAGNOSIS — I10 ESSENTIAL HYPERTENSION: Primary | Chronic | ICD-10-CM

## 2021-05-07 DIAGNOSIS — E78.2 MIXED HYPERLIPIDEMIA: ICD-10-CM

## 2021-05-07 DIAGNOSIS — Z79.01 LONG TERM (CURRENT) USE OF ANTICOAGULANTS: ICD-10-CM

## 2021-05-07 PROBLEM — I48.91 ATRIAL FIBRILLATION WITH RAPID VENTRICULAR RESPONSE: Status: RESOLVED | Noted: 2020-03-05 | Resolved: 2021-05-07

## 2021-05-07 PROBLEM — E78.5 HYPERLIPIDEMIA: Status: ACTIVE | Noted: 2019-11-21

## 2021-05-07 LAB — INR PPP: 2.1 (ref 0.9–1.1)

## 2021-05-07 PROCEDURE — 85610 PROTHROMBIN TIME: CPT | Performed by: INTERNAL MEDICINE

## 2021-05-07 PROCEDURE — 99213 OFFICE O/P EST LOW 20 MIN: CPT | Performed by: INTERNAL MEDICINE

## 2021-05-07 PROCEDURE — 93000 ELECTROCARDIOGRAM COMPLETE: CPT | Performed by: INTERNAL MEDICINE

## 2021-05-07 PROCEDURE — 36416 COLLJ CAPILLARY BLOOD SPEC: CPT | Performed by: INTERNAL MEDICINE

## 2021-05-07 NOTE — PROGRESS NOTES
Subjective:     Encounter Date:05/07/2021      Patient ID: Filipe Altamirano is a 71 y.o. male.    Chief Complaint: Hypertension & Hyperlipidemia  History of Present Illness     71-year-old white male patient with previous history of hypertension dyslipidemia history of hypothyroidism no cardiac issues had a negative stress test November 2019      Patient was admitted beginning of 2020 with new onset atrial fibrillation        Patient subsequently converted into sinus rhythm somewhat spontaneously     Patient is staying in sinus rhythm without any further symptoms denies of any chest pain shortness of breath syncopal episodes lower extremity edema PND orthopnea  Patient is currently taking  beta-blockers and warfarin     March 2020 echocardiogram EF 55% mild to moderate aortic insufficiency mild mitral insufficiency    Patient tried to stop the beta-blockers but he went back into atrial fibrillation so he restarted beta-blocker and now doing well     Recent labs TSH mildly elevated so advised him to follow-up with PCP regarding Synthroid dosing his lipids showed elevated triglycerides mildly elevated LDL he would like to diet control  Follow-up in 6 months with labs and EKG    Patient comes back for follow-up EKG today normal sinus rhythm normal axis compared to the last EKG atrial fibrillation no longer seen noted first-degree AV block now present  Recent labs lipids uncontrolled but patient refused to take any statins  Patient is doing well without any symptoms we will continue current medications follow-up with PCP regarding dyslipidemia I will see him back in 6 months the EKG    The following portions of the patient's history were reviewed and updated as appropriate: Allergies current medications past family history past medical history past social history past surgical history problem list and review of systems  Past Medical History:   Diagnosis Date   • Atrial fibrillation (CMS/HCC)    • Disease of thyroid  "gland    • Hyperlipidemia    • Hypertension    • Vertigo      Past Surgical History:   Procedure Laterality Date   • ABDOMINAL SURGERY     • COLON SURGERY     • HERNIA REPAIR       /78 (BP Location: Right arm, Patient Position: Sitting, Cuff Size: Adult)   Pulse 65   Ht 190.5 cm (75\")   Wt 125 kg (276 lb)   SpO2 96%   BMI 34.50 kg/m²   Family History   Problem Relation Age of Onset   • No Known Problems Mother    • No Known Problems Father    • Arrhythmia Brother        Current Outpatient Medications:   •  Cholecalciferol (Vitamin D3) 50 MCG (2000 UT) capsule, Vitamin D3 50 mcg (2,000 unit) capsule  Take 3 capsules by mouth once daily X 8 weeks. Then take 1 capsule once daily by mouth X 4 weeks., Disp: , Rfl:   •  Cyanocobalamin 1000 MCG/ML kit, 1 mL., Disp: , Rfl:   •  levothyroxine (SYNTHROID, LEVOTHROID) 25 MCG tablet, levothyroxine 25 mcg tablet  TAKE ONE and 1/2 TABLETS BY MOUTH DAILY for a total of 37.5 mcg daily., Disp: , Rfl:   •  losartan (COZAAR) 25 MG tablet, Take 1 tablet by mouth Daily., Disp: 90 tablet, Rfl: 2  •  metoprolol tartrate (LOPRESSOR) 25 MG tablet, TAKE ONE TABLET BY MOUTH THREE TIMES A DAY, Disp: 270 tablet, Rfl: 0  •  warfarin (COUMADIN) 5 MG tablet, Take 2 tablet by mouth on Monday and Friday and take one and one half tablets by mouth all other days or as directed, Disp: 54 tablet, Rfl: 1  Social History     Socioeconomic History   • Marital status:      Spouse name: Not on file   • Number of children: Not on file   • Years of education: Not on file   • Highest education level: Not on file   Tobacco Use   • Smoking status: Former Smoker     Quit date:      Years since quittin.3   • Smokeless tobacco: Never Used   Vaping Use   • Vaping Use: Never used   Substance and Sexual Activity   • Alcohol use: No   • Drug use: No   • Sexual activity: Defer     Allergies   Allergen Reactions   • Codeine Paresthesia     Other reaction(s): Unknown   • Diltiazem Arrhythmia     " Racing heart, sleep issues     Review of Systems   Constitutional: Negative for fever and malaise/fatigue.   Cardiovascular: Negative for chest pain, dyspnea on exertion and palpitations.   Respiratory: Negative for cough and shortness of breath.    Skin: Negative for rash.   Gastrointestinal: Negative for abdominal pain, nausea and vomiting.   Neurological: Negative for focal weakness and headaches.   All other systems reviewed and are negative.             Objective:     Physical Exam  Vitals reviewed neck no JVP elevation lungs bilateral mostly clear heart sounds S1-S2 regular extremities no edema bilateral pulses present equal    ECG 12 Lead    Date/Time: 5/7/2021 10:16 AM  Performed by: Fracisco Villareal MD  Authorized by: Fracisco Villareal MD   Comments: Sinus rhythm normal axis compared to the last EKG A. fib no longer seen first-degree AV block present            Lab Review:       Assessment:          Diagnosis Plan   1. Essential hypertension     2. Paroxysmal atrial fibrillation (CMS/HCC)     3. Long term (current) use of anticoagulants     4. Mixed hyperlipidemia            Plan:       MDM  Number of Diagnoses or Management Options  Essential hypertension: established, improving  Long term (current) use of anticoagulants: established, improving  Mixed hyperlipidemia: established, worsening  Paroxysmal atrial fibrillation (CMS/HCC): established, improving     Amount and/or Complexity of Data Reviewed  Clinical lab tests: reviewed  Review and summarize past medical records: yes    Risk of Complications, Morbidity, and/or Mortality  Presenting problems: moderate  Management options: moderate    Patient Progress  Patient progress: stable

## 2021-05-14 DIAGNOSIS — I48.91 ATRIAL FIBRILLATION WITH RAPID VENTRICULAR RESPONSE (HCC): ICD-10-CM

## 2021-05-14 DIAGNOSIS — Z79.01 LONG TERM (CURRENT) USE OF ANTICOAGULANTS: ICD-10-CM

## 2021-05-14 RX ORDER — WARFARIN SODIUM 5 MG/1
TABLET ORAL
Qty: 54 TABLET | Refills: 0 | Status: SHIPPED | OUTPATIENT
Start: 2021-05-14 | End: 2021-06-08

## 2021-06-08 DIAGNOSIS — Z79.01 LONG TERM (CURRENT) USE OF ANTICOAGULANTS: ICD-10-CM

## 2021-06-08 DIAGNOSIS — I48.91 ATRIAL FIBRILLATION WITH RAPID VENTRICULAR RESPONSE (HCC): ICD-10-CM

## 2021-06-08 RX ORDER — WARFARIN SODIUM 5 MG/1
TABLET ORAL
Qty: 45 TABLET | Refills: 1 | Status: SHIPPED | OUTPATIENT
Start: 2021-06-08 | End: 2021-07-19

## 2021-06-18 ENCOUNTER — ANTICOAGULATION VISIT (OUTPATIENT)
Dept: CARDIOLOGY | Facility: CLINIC | Age: 71
End: 2021-06-18

## 2021-06-18 VITALS — DIASTOLIC BLOOD PRESSURE: 74 MMHG | HEART RATE: 49 BPM | SYSTOLIC BLOOD PRESSURE: 131 MMHG

## 2021-06-18 DIAGNOSIS — Z79.01 LONG TERM (CURRENT) USE OF ANTICOAGULANTS: Primary | ICD-10-CM

## 2021-06-18 LAB — INR PPP: 2.2 (ref 0.9–1.1)

## 2021-06-18 PROCEDURE — 36416 COLLJ CAPILLARY BLOOD SPEC: CPT | Performed by: INTERNAL MEDICINE

## 2021-06-18 PROCEDURE — 85610 PROTHROMBIN TIME: CPT | Performed by: INTERNAL MEDICINE

## 2021-07-19 DIAGNOSIS — I48.91 ATRIAL FIBRILLATION WITH RAPID VENTRICULAR RESPONSE (HCC): ICD-10-CM

## 2021-07-19 DIAGNOSIS — Z79.01 LONG TERM (CURRENT) USE OF ANTICOAGULANTS: ICD-10-CM

## 2021-07-19 RX ORDER — WARFARIN SODIUM 5 MG/1
TABLET ORAL
Qty: 45 TABLET | Refills: 1 | Status: SHIPPED | OUTPATIENT
Start: 2021-07-19 | End: 2021-09-10

## 2021-07-28 ENCOUNTER — ANTICOAGULATION VISIT (OUTPATIENT)
Dept: CARDIOLOGY | Facility: CLINIC | Age: 71
End: 2021-07-28

## 2021-07-28 VITALS — SYSTOLIC BLOOD PRESSURE: 141 MMHG | DIASTOLIC BLOOD PRESSURE: 78 MMHG | HEART RATE: 57 BPM

## 2021-07-28 DIAGNOSIS — Z79.01 LONG TERM (CURRENT) USE OF ANTICOAGULANTS: Primary | ICD-10-CM

## 2021-07-28 LAB — INR PPP: 3.5 (ref 0.9–1.1)

## 2021-07-28 PROCEDURE — 85610 PROTHROMBIN TIME: CPT | Performed by: INTERNAL MEDICINE

## 2021-07-28 PROCEDURE — 36416 COLLJ CAPILLARY BLOOD SPEC: CPT | Performed by: INTERNAL MEDICINE

## 2021-08-12 ENCOUNTER — TELEPHONE (OUTPATIENT)
Dept: CARDIOLOGY | Facility: CLINIC | Age: 71
End: 2021-08-12

## 2021-08-12 NOTE — TELEPHONE ENCOUNTER
I called Dr. Ruiz's office ([phone])for patient and requested clearance request be faxed to us. Patient is needing tooth pulled, and is on blood thinners.

## 2021-08-17 ENCOUNTER — ANTICOAGULATION VISIT (OUTPATIENT)
Dept: CARDIOLOGY | Facility: CLINIC | Age: 71
End: 2021-08-17

## 2021-08-17 VITALS — SYSTOLIC BLOOD PRESSURE: 139 MMHG | DIASTOLIC BLOOD PRESSURE: 73 MMHG | HEART RATE: 63 BPM

## 2021-08-17 DIAGNOSIS — Z79.01 LONG TERM (CURRENT) USE OF ANTICOAGULANTS: Primary | ICD-10-CM

## 2021-08-17 LAB — INR PPP: 2 (ref 0.9–1.1)

## 2021-08-17 PROCEDURE — 36416 COLLJ CAPILLARY BLOOD SPEC: CPT | Performed by: INTERNAL MEDICINE

## 2021-08-17 PROCEDURE — 85610 PROTHROMBIN TIME: CPT | Performed by: INTERNAL MEDICINE

## 2021-08-30 NOTE — TELEPHONE ENCOUNTER
Rx Refill Note  Requested Prescriptions     Pending Prescriptions Disp Refills   • metoprolol tartrate (LOPRESSOR) 25 MG tablet [Pharmacy Med Name: METOPROLOL TARTRATE 25 MG TAB] 270 tablet 0     Sig: TAKE ONE TABLET BY MOUTH THREE TIMES A DAY      Last office visit with prescribing clinician: 5/7/2021      Next office visit with prescribing clinician: 11/12/2021     SCANNED - LABS (04/20/2021)         Taniya Naik MA  08/30/21, 08:50 EDT

## 2021-09-10 DIAGNOSIS — Z79.01 LONG TERM (CURRENT) USE OF ANTICOAGULANTS: ICD-10-CM

## 2021-09-10 DIAGNOSIS — I48.91 ATRIAL FIBRILLATION WITH RAPID VENTRICULAR RESPONSE (HCC): ICD-10-CM

## 2021-09-10 RX ORDER — WARFARIN SODIUM 5 MG/1
TABLET ORAL
Qty: 50 TABLET | Refills: 1 | Status: SHIPPED | OUTPATIENT
Start: 2021-09-10 | End: 2021-10-28 | Stop reason: SDUPTHER

## 2021-09-10 NOTE — TELEPHONE ENCOUNTER
Rx Refill Note  Requested Prescriptions     Pending Prescriptions Disp Refills   • warfarin (COUMADIN) 5 MG tablet [Pharmacy Med Name: WARFARIN SODIUM 5 MG TABLET] 45 tablet 1     Sig: TAKE 2 TABLETS BY MOUTH DAILY ON MONDAY AND FRIDAY; TAKE 1 AND 1/2 TABLETS BY MOUTH ALL OTHER DAYS, OR AS DIRECTED      Last office visit with prescribing clinician: 5/7/2021      Next office visit with prescribing clinician: 11/12/2021              Anticoagulation Visit (08/17/2021)    Priyanka Castro LPN  09/10/21, 08:53 EDT

## 2021-09-28 ENCOUNTER — ANTICOAGULATION VISIT (OUTPATIENT)
Dept: CARDIOLOGY | Facility: CLINIC | Age: 71
End: 2021-09-28

## 2021-09-28 VITALS
HEART RATE: 60 BPM | DIASTOLIC BLOOD PRESSURE: 69 MMHG | WEIGHT: 265 LBS | SYSTOLIC BLOOD PRESSURE: 124 MMHG | BODY MASS INDEX: 33.12 KG/M2

## 2021-09-28 DIAGNOSIS — Z79.01 LONG TERM (CURRENT) USE OF ANTICOAGULANTS: Primary | ICD-10-CM

## 2021-09-28 LAB — INR PPP: 1.7 (ref 0.9–1.1)

## 2021-09-28 PROCEDURE — 85610 PROTHROMBIN TIME: CPT | Performed by: INTERNAL MEDICINE

## 2021-09-28 PROCEDURE — 36416 COLLJ CAPILLARY BLOOD SPEC: CPT | Performed by: INTERNAL MEDICINE

## 2021-10-28 DIAGNOSIS — Z79.01 LONG TERM (CURRENT) USE OF ANTICOAGULANTS: ICD-10-CM

## 2021-10-28 DIAGNOSIS — I48.91 ATRIAL FIBRILLATION WITH RAPID VENTRICULAR RESPONSE (HCC): ICD-10-CM

## 2021-10-28 RX ORDER — WARFARIN SODIUM 5 MG/1
TABLET ORAL
Qty: 50 TABLET | Refills: 1 | Status: SHIPPED | OUTPATIENT
Start: 2021-10-28 | End: 2022-02-23 | Stop reason: SDUPTHER

## 2021-10-28 NOTE — TELEPHONE ENCOUNTER
Rx Refill Note  Requested Prescriptions     Pending Prescriptions Disp Refills   • warfarin (COUMADIN) 5 MG tablet 50 tablet 1     Sig: TAKE 2 TABLETS BY MOUTH DAILY ON MONDAY AND FRIDAY; TAKE 1 AND 1/2 TABLETS BY MOUTH ALL OTHER DAYS, OR AS DIRECTED      Last office visit with prescribing clinician: Visit date not found      Next office visit with prescribing clinician: 11/12/2021                Anticoagulation Visit (09/28/2021)      Priyanka Castro LPN  10/28/21, 16:33 EDT

## 2021-11-12 ENCOUNTER — ANTICOAGULATION VISIT (OUTPATIENT)
Dept: CARDIOLOGY | Facility: CLINIC | Age: 71
End: 2021-11-12

## 2021-11-12 ENCOUNTER — OFFICE VISIT (OUTPATIENT)
Dept: CARDIOLOGY | Facility: CLINIC | Age: 71
End: 2021-11-12

## 2021-11-12 VITALS
WEIGHT: 269 LBS | SYSTOLIC BLOOD PRESSURE: 144 MMHG | DIASTOLIC BLOOD PRESSURE: 77 MMHG | BODY MASS INDEX: 33.62 KG/M2 | HEART RATE: 54 BPM

## 2021-11-12 VITALS
OXYGEN SATURATION: 98 % | HEART RATE: 54 BPM | WEIGHT: 269 LBS | DIASTOLIC BLOOD PRESSURE: 77 MMHG | SYSTOLIC BLOOD PRESSURE: 144 MMHG | BODY MASS INDEX: 33.45 KG/M2 | HEIGHT: 75 IN

## 2021-11-12 DIAGNOSIS — I10 ESSENTIAL HYPERTENSION: Chronic | ICD-10-CM

## 2021-11-12 DIAGNOSIS — Z79.01 LONG TERM (CURRENT) USE OF ANTICOAGULANTS: ICD-10-CM

## 2021-11-12 DIAGNOSIS — I48.92 ATRIAL FLUTTER WITH RAPID VENTRICULAR RESPONSE (HCC): ICD-10-CM

## 2021-11-12 DIAGNOSIS — I48.11 LONGSTANDING PERSISTENT ATRIAL FIBRILLATION (HCC): Primary | ICD-10-CM

## 2021-11-12 DIAGNOSIS — I48.0 PAROXYSMAL ATRIAL FIBRILLATION (HCC): Primary | ICD-10-CM

## 2021-11-12 LAB — INR PPP: 2.1 (ref 0.9–1.1)

## 2021-11-12 PROCEDURE — 93000 ELECTROCARDIOGRAM COMPLETE: CPT | Performed by: INTERNAL MEDICINE

## 2021-11-12 PROCEDURE — 36416 COLLJ CAPILLARY BLOOD SPEC: CPT | Performed by: INTERNAL MEDICINE

## 2021-11-12 PROCEDURE — 99214 OFFICE O/P EST MOD 30 MIN: CPT | Performed by: INTERNAL MEDICINE

## 2021-11-12 PROCEDURE — 85610 PROTHROMBIN TIME: CPT | Performed by: INTERNAL MEDICINE

## 2021-11-12 RX ORDER — LEVOTHYROXINE SODIUM 0.07 MG/1
75 TABLET ORAL DAILY
COMMUNITY
Start: 2021-10-05 | End: 2022-05-10 | Stop reason: DRUGHIGH

## 2021-11-12 NOTE — PROGRESS NOTES
HP      Name: Filipe Altamirano ADMIT: (Not on file)   : 1950  PCP: Frantz Saleem MD    MRN: [id number] LOS: 0 days   AGE/SEX: 71 y.o. male  ROOM: Room/bed info not found     Chief Complaint   Patient presents with   • essential hypertention     6 month f/u    • Atrial Fibrillation       Subjective         History of present illness  Filipe Altamirano is a 71-year-old male patient who has history of paroxysmal atrial fibrillation initially diagnosed and 2020.  Is here today for follow-up.  Patient is feeling well denies having any chest pain or shortness of breath no recent palpitations no lower extremity edema no syncopal episodes overall he feels well.  No recent hospitalizations    Past Medical History:   Diagnosis Date   • Atrial fibrillation (HCC)    • Disease of thyroid gland    • Hyperlipidemia    • Hypertension    • Vertigo      Past Surgical History:   Procedure Laterality Date   • ABDOMINAL SURGERY     • COLON SURGERY     • HERNIA REPAIR       Family History   Problem Relation Age of Onset   • No Known Problems Mother    • No Known Problems Father    • Arrhythmia Brother      Social History     Tobacco Use   • Smoking status: Former Smoker     Quit date:      Years since quittin.8   • Smokeless tobacco: Never Used   Vaping Use   • Vaping Use: Never used   Substance Use Topics   • Alcohol use: No   • Drug use: No     (Not in a hospital admission)    Allergies:  Codeine and Diltiazem      Physical Exam  VITALS REVIEWED    General:      well developed, in no acute distress.    Head:      normocephalic and atraumatic.    Eyes:      PERRL/EOM intact, conjunctiva and sclera clear with out nystagmus.    Neck:      no masses, thyromegaly,  trachea central with normal respiratory effort and PMI displaced laterally  Lungs:      Clear to auscultation bilaterally  Heart:       Regular rate and rhythm  Msk:      no deformity or scoliosis noted of thoracic or lumbar spine.    Pulses:      pulses normal in  all 4 extremities.    Extremities:       No lower extremity edema  Neurologic:      no focal deficits.   alert oriented x3  Skin:      intact without lesions or rashes.    Psych:      alert and cooperative; normal mood and affect; normal attention span and concentration.      Result Review :                Pertinent cardiac workup    1. Echo on 3/17/2020 ejection fraction 55% mild to moderate aortic regurgitation  2. Telemetry strips from 3/5/2020(first trip) atrial flutter        ECG 12 Lead    Date/Time: 11/12/2021 2:29 PM  Performed by: Robert Brown MD  Authorized by: Robert Brown MD   Comparison: compared with previous ECG   Similar to previous ECG  Rhythm: sinus rhythm  Rate: normal  BPM: 59  Conduction: 1st degree AV block  ST Segments: ST segments normal  QRS axis: normal  Other findings: non-specific ST-T wave changes                Assessment and Plan      Filipe Altamirano is a 71-year-old male patient who has history of atrial fibrillation as well as atrial flutter initially diagnosed in 2020.  Patient has been on metoprolol and Coumadin and it seems that he has been doing well with no significant recurrences.  EKG today shows sinus rhythm with first-degree AV block.  At this time the patient is not interested in any invasive procedures and would like to stay on his current medications although I did offer him the option for ablation.  I will see him in 6 months for follow-up or sooner if he has any issues.    Diagnoses and all orders for this visit:    1. Paroxysmal atrial fibrillation (HCC) (Primary)    2. Atrial flutter with rapid ventricular response (HCC)    3. Long term (current) use of anticoagulants    4. Essential hypertension    Other orders  -     ECG 12 Lead           Return in about 6 months (around 5/12/2022).  Patient was given instructions and counseling regarding his condition or for health maintenance advice. Please see specific information pulled into the AVS if appropriate.

## 2021-11-22 ENCOUNTER — TELEPHONE (OUTPATIENT)
Dept: CARDIOLOGY | Facility: CLINIC | Age: 71
End: 2021-11-22

## 2021-11-22 NOTE — TELEPHONE ENCOUNTER
Rx Refill Note  Requested Prescriptions     Pending Prescriptions Disp Refills   • metoprolol tartrate (LOPRESSOR) 25 MG tablet 270 tablet 3     Sig: Take 1 tablet by mouth 3 (Three) Times a Day.      Last office visit with prescribing clinician: 11/12/2021      Next office visit with prescribing clinician: 11/22/2021     No recent labs     {TIP  Is Refill Pharmacy correct?:23}  Taniya Naik MA  11/22/21, 09:50 EST

## 2021-11-22 NOTE — TELEPHONE ENCOUNTER
Incoming Refill Request      Medication requested (name and dose): METOPROLOL 25 MG    Pharmacy where request should be sent: [facility]    Additional details provided by patient:     Best call back number: [phone]  Does the patient have less than a 3 day supply:  [] Yes  [x] No    Shade Junior Rep  11/22/21, 09:48 EST

## 2021-11-22 NOTE — TELEPHONE ENCOUNTER
IS THERE A WAY TO PUT HOLD ON WARFARIN REFILLS? HE JUST WENT TO  METOPROLOL HE THOUGHT, BUT IT WAS MORE WARFARIN. HE IS RUNNING OUT OF PLACES TO KEEP IT.

## 2021-11-23 NOTE — TELEPHONE ENCOUNTER
Called patient advised he needs to take his warfarin off of automatic refill if he is getting warfarin too often. Advised patient he then would need to call to get medication refilled. Pt verbalizes understanding apLPN

## 2021-12-21 ENCOUNTER — ANTICOAGULATION VISIT (OUTPATIENT)
Dept: CARDIOLOGY | Facility: CLINIC | Age: 71
End: 2021-12-21

## 2021-12-21 VITALS
DIASTOLIC BLOOD PRESSURE: 70 MMHG | BODY MASS INDEX: 33.87 KG/M2 | SYSTOLIC BLOOD PRESSURE: 156 MMHG | HEART RATE: 55 BPM | WEIGHT: 271 LBS

## 2021-12-21 DIAGNOSIS — Z79.01 LONG TERM (CURRENT) USE OF ANTICOAGULANTS: ICD-10-CM

## 2021-12-21 DIAGNOSIS — I48.91 ATRIAL FIBRILLATION, UNSPECIFIED TYPE (HCC): Primary | ICD-10-CM

## 2021-12-21 LAB — INR PPP: 1.6 (ref 0.9–1.1)

## 2021-12-21 PROCEDURE — 36416 COLLJ CAPILLARY BLOOD SPEC: CPT | Performed by: INTERNAL MEDICINE

## 2021-12-21 PROCEDURE — 85610 PROTHROMBIN TIME: CPT | Performed by: INTERNAL MEDICINE

## 2021-12-21 NOTE — PROGRESS NOTES
INR low today, Increase dosage to 10 mgs on Mon, Weds and Fri , 7.5 mgs all other days and recheck in a month. AmandaN

## 2022-01-28 ENCOUNTER — ANTICOAGULATION VISIT (OUTPATIENT)
Dept: CARDIOLOGY | Facility: CLINIC | Age: 72
End: 2022-01-28

## 2022-01-28 VITALS
WEIGHT: 269 LBS | BODY MASS INDEX: 33.62 KG/M2 | SYSTOLIC BLOOD PRESSURE: 134 MMHG | HEART RATE: 60 BPM | DIASTOLIC BLOOD PRESSURE: 75 MMHG

## 2022-01-28 DIAGNOSIS — I48.91 ATRIAL FIBRILLATION, UNSPECIFIED TYPE: Primary | ICD-10-CM

## 2022-01-28 DIAGNOSIS — Z79.01 LONG TERM (CURRENT) USE OF ANTICOAGULANTS: ICD-10-CM

## 2022-01-28 LAB — INR PPP: 2.7 (ref 0.9–1.1)

## 2022-01-28 PROCEDURE — 85610 PROTHROMBIN TIME: CPT | Performed by: INTERNAL MEDICINE

## 2022-01-28 PROCEDURE — 36416 COLLJ CAPILLARY BLOOD SPEC: CPT | Performed by: INTERNAL MEDICINE

## 2022-02-23 ENCOUNTER — TELEPHONE (OUTPATIENT)
Dept: CARDIOLOGY | Facility: CLINIC | Age: 72
End: 2022-02-23

## 2022-02-23 DIAGNOSIS — I48.91 ATRIAL FIBRILLATION WITH RAPID VENTRICULAR RESPONSE: ICD-10-CM

## 2022-02-23 DIAGNOSIS — Z79.01 LONG TERM (CURRENT) USE OF ANTICOAGULANTS: ICD-10-CM

## 2022-02-23 RX ORDER — WARFARIN SODIUM 5 MG/1
TABLET ORAL
Qty: 60 TABLET | Refills: 1 | Status: SHIPPED | OUTPATIENT
Start: 2022-02-23 | End: 2022-04-04 | Stop reason: SDUPTHER

## 2022-02-23 NOTE — TELEPHONE ENCOUNTER
Incoming Refill Request      Medication requested (name and dose): warfarin 5 mg    [facility]arm[facility] where request should be sent[facility]    Additional details provided by patient:      Best call back number: [phone]    Does the patient have less than a 3 day supply:  [] Yes  [x] No    Fe Diazed Rep  02/23/22, 14:44 EST

## 2022-02-23 NOTE — TELEPHONE ENCOUNTER
Rx Refill Note  Requested Prescriptions     Pending Prescriptions Disp Refills   • warfarin (COUMADIN) 5 MG tablet 60 tablet 1     Sig: TAKE 2 TABLETS BY MOUTH DAILY ON MONDAY Wednesday AND FRIDAY; TAKE 1 AND 1/2 TABLETS BY MOUTH ALL OTHER DAYS, OR AS DIRECTED      Last office visit with prescribing clinician: 11/12/2021      Next office visit with prescribing clinician: 5/10/2022              Anticoagulation Visit (01/28/2022)      Priyanka Castro LPN  02/23/22, 15:16 EST

## 2022-03-09 ENCOUNTER — ANTICOAGULATION VISIT (OUTPATIENT)
Dept: CARDIOLOGY | Facility: CLINIC | Age: 72
End: 2022-03-09

## 2022-03-09 VITALS
HEART RATE: 62 BPM | WEIGHT: 276 LBS | OXYGEN SATURATION: 94 % | BODY MASS INDEX: 34.5 KG/M2 | DIASTOLIC BLOOD PRESSURE: 85 MMHG | SYSTOLIC BLOOD PRESSURE: 133 MMHG

## 2022-03-09 DIAGNOSIS — Z79.01 LONG TERM (CURRENT) USE OF ANTICOAGULANTS: ICD-10-CM

## 2022-03-09 DIAGNOSIS — I48.11 LONGSTANDING PERSISTENT ATRIAL FIBRILLATION: Primary | ICD-10-CM

## 2022-03-09 LAB — INR PPP: 2.6 (ref 0.9–1.1)

## 2022-03-09 PROCEDURE — 36416 COLLJ CAPILLARY BLOOD SPEC: CPT | Performed by: INTERNAL MEDICINE

## 2022-03-09 PROCEDURE — 85610 PROTHROMBIN TIME: CPT | Performed by: INTERNAL MEDICINE

## 2022-04-04 ENCOUNTER — TELEPHONE (OUTPATIENT)
Dept: CARDIOLOGY | Facility: CLINIC | Age: 72
End: 2022-04-04

## 2022-04-04 DIAGNOSIS — I48.91 ATRIAL FIBRILLATION WITH RAPID VENTRICULAR RESPONSE: ICD-10-CM

## 2022-04-04 DIAGNOSIS — Z79.01 LONG TERM (CURRENT) USE OF ANTICOAGULANTS: ICD-10-CM

## 2022-04-04 RX ORDER — WARFARIN SODIUM 5 MG/1
TABLET ORAL
Qty: 60 TABLET | Refills: 2 | Status: SHIPPED | OUTPATIENT
Start: 2022-04-04 | End: 2022-05-10 | Stop reason: SDUPTHER

## 2022-04-04 NOTE — TELEPHONE ENCOUNTER
Rx Refill Note  Requested Prescriptions     Pending Prescriptions Disp Refills   • warfarin (COUMADIN) 5 MG tablet 60 tablet 2     Sig: TAKE 2 TABLETS BY MOUTH DAILY ON MONDAY Wednesday AND FRIDAY; TAKE 1 AND 1/2 TABLETS BY MOUTH ALL OTHER DAYS, OR AS DIRECTED      Last office visit with prescribing clinician: 11/12/2021      Next office visit with prescribing clinician: 5/10/2022              Anticoagulation Visit (03/09/2022)    Priyanka Castro LPN  04/04/22, 11:25 EDT

## 2022-04-04 NOTE — TELEPHONE ENCOUNTER
Incoming Refill Request      Medication requested (name and dose): WARFARIN 5 MG    Pharmacy where request should be sent: [facility]    Additional details provided by patient: WANTS IT BACK ON AUTO REFILLS. HE IS OUT OF MEDICATION.  Best call back number:[phone]    Does the patient have less than a 3 day supply:  [x] Yes  [] No    Shade Junior Rep  04/04/22, 09:04 EDT

## 2022-04-20 ENCOUNTER — ANTICOAGULATION VISIT (OUTPATIENT)
Dept: CARDIOLOGY | Facility: CLINIC | Age: 72
End: 2022-04-20

## 2022-04-20 VITALS
SYSTOLIC BLOOD PRESSURE: 145 MMHG | HEART RATE: 57 BPM | OXYGEN SATURATION: 97 % | WEIGHT: 270 LBS | BODY MASS INDEX: 33.75 KG/M2 | DIASTOLIC BLOOD PRESSURE: 75 MMHG

## 2022-04-20 DIAGNOSIS — I48.11 LONGSTANDING PERSISTENT ATRIAL FIBRILLATION: Primary | ICD-10-CM

## 2022-04-20 DIAGNOSIS — Z79.01 LONG TERM (CURRENT) USE OF ANTICOAGULANTS: ICD-10-CM

## 2022-04-20 LAB — INR PPP: 2 (ref 0.9–1.1)

## 2022-04-20 PROCEDURE — 36416 COLLJ CAPILLARY BLOOD SPEC: CPT | Performed by: INTERNAL MEDICINE

## 2022-04-20 PROCEDURE — 85610 PROTHROMBIN TIME: CPT | Performed by: INTERNAL MEDICINE

## 2022-05-10 ENCOUNTER — OFFICE VISIT (OUTPATIENT)
Dept: CARDIOLOGY | Facility: CLINIC | Age: 72
End: 2022-05-10

## 2022-05-10 ENCOUNTER — ANTICOAGULATION VISIT (OUTPATIENT)
Dept: CARDIOLOGY | Facility: CLINIC | Age: 72
End: 2022-05-10

## 2022-05-10 VITALS
SYSTOLIC BLOOD PRESSURE: 133 MMHG | HEART RATE: 61 BPM | WEIGHT: 275 LBS | DIASTOLIC BLOOD PRESSURE: 80 MMHG | BODY MASS INDEX: 34.37 KG/M2

## 2022-05-10 VITALS
BODY MASS INDEX: 34.19 KG/M2 | SYSTOLIC BLOOD PRESSURE: 133 MMHG | WEIGHT: 275 LBS | HEART RATE: 61 BPM | DIASTOLIC BLOOD PRESSURE: 80 MMHG | HEIGHT: 75 IN

## 2022-05-10 DIAGNOSIS — I48.92 ATRIAL FLUTTER WITH RAPID VENTRICULAR RESPONSE: Primary | ICD-10-CM

## 2022-05-10 DIAGNOSIS — Z79.01 LONG TERM (CURRENT) USE OF ANTICOAGULANTS: ICD-10-CM

## 2022-05-10 DIAGNOSIS — I48.11 LONGSTANDING PERSISTENT ATRIAL FIBRILLATION: Primary | ICD-10-CM

## 2022-05-10 DIAGNOSIS — I10 ESSENTIAL HYPERTENSION: Chronic | ICD-10-CM

## 2022-05-10 DIAGNOSIS — I48.91 ATRIAL FIBRILLATION WITH RAPID VENTRICULAR RESPONSE: ICD-10-CM

## 2022-05-10 DIAGNOSIS — I48.0 PAROXYSMAL ATRIAL FIBRILLATION: ICD-10-CM

## 2022-05-10 LAB — INR PPP: 2.8 (ref 0.9–1.1)

## 2022-05-10 PROCEDURE — 36416 COLLJ CAPILLARY BLOOD SPEC: CPT | Performed by: INTERNAL MEDICINE

## 2022-05-10 PROCEDURE — 85610 PROTHROMBIN TIME: CPT | Performed by: INTERNAL MEDICINE

## 2022-05-10 PROCEDURE — 93000 ELECTROCARDIOGRAM COMPLETE: CPT | Performed by: INTERNAL MEDICINE

## 2022-05-10 PROCEDURE — 99214 OFFICE O/P EST MOD 30 MIN: CPT | Performed by: INTERNAL MEDICINE

## 2022-05-10 RX ORDER — WARFARIN SODIUM 5 MG/1
TABLET ORAL
Qty: 180 TABLET | Refills: 0 | Status: SHIPPED | OUTPATIENT
Start: 2022-05-10 | End: 2022-08-19 | Stop reason: SDUPTHER

## 2022-05-10 RX ORDER — LEVOTHYROXINE SODIUM 88 UG/1
TABLET ORAL
COMMUNITY
End: 2022-11-08 | Stop reason: DRUGHIGH

## 2022-05-10 NOTE — TELEPHONE ENCOUNTER
Rx Refill Note  Requested Prescriptions     Signed Prescriptions Disp Refills   • warfarin (COUMADIN) 5 MG tablet 180 tablet 0     Sig: TAKE 2 TABLETS BY MOUTH DAILY ON MONDAY Wednesday AND FRIDAY; TAKE 1 AND 1/2 TABLETS BY MOUTH ALL OTHER DAYS, OR AS DIRECTED     Authorizing Provider: DOM CHATMAN     Ordering User: PRIYANKA CASTRO      Last office visit with prescribing clinician: 11/12/2021      Next office visit with prescribing clinician: Visit date not found              Anticoagulation Visit (05/10/2022)    Priyanka Castro LPN  05/10/22, 09:03 EDT

## 2022-05-10 NOTE — PROGRESS NOTES
Progress note      Name: Filipe Altamirano ADMIT: (Not on file)   : 1950  PCP: Frantz Saleem MD    MRN: [id number] LOS: 0 days   AGE/SEX: 72 y.o. male  ROOM: Room/bed info not found     Chief Complaint   Patient presents with   • Atrial Fibrillation     6 MO F/U       Subjective       History of present illness  Filipe Altamirano is a 72-year-old male patient was history of paroxysmal atrial fibrillation initially diagnosed in , also hypertension, dyslipidemia, here today for follow-up.  Patient has been doing well, denies having any chest pain or shortness of breath, no palpitations no lower extremity edema no syncopal episodes, no recent hospitalizations.  He has been compliant with metoprolol and warfarin with no issues of bleeding or other symptoms.    Past Medical History:   Diagnosis Date   • Atrial fibrillation (HCC)    • Disease of thyroid gland    • Hyperlipidemia    • Hypertension    • Vertigo      Past Surgical History:   Procedure Laterality Date   • ABDOMINAL SURGERY     • COLON SURGERY     • HERNIA REPAIR       Family History   Problem Relation Age of Onset   • No Known Problems Mother    • No Known Problems Father    • Arrhythmia Brother      Social History     Tobacco Use   • Smoking status: Former Smoker     Packs/day: 0.00     Years: 10.00     Pack years: 0.00     Types: Cigarettes     Start date: 6/15/1969     Quit date: 6/15/1979     Years since quittin.9   • Smokeless tobacco: Never Used   • Tobacco comment: While in the [employer]   Vaping Use   • Vaping Use: Never used   Substance Use Topics   • Alcohol use: No   • Drug use: No     (Not in a hospital admission)    Allergies:  Codeine and Diltiazem      Physical Exam  VITALS REVIEWED    General:      well developed, in no acute distress.    Head:      normocephalic and atraumatic.    Eyes:      PERRL/EOM intact, conjunctiva and sclera clear with out nystagmus.    Neck:      no masses, thyromegaly,  trachea central with normal respiratory  effort and PMI displaced laterally  Lungs:      Clear to auscultation bilaterally  Heart:       Regular rate and rhythm  Msk:      no deformity or scoliosis noted of thoracic or lumbar spine.    Pulses:      pulses normal in all 4 extremities.    Extremities:       No lower extremity edema  Neurologic:      no focal deficits.   alert oriented x3  Skin:      intact without lesions or rashes.    Psych:      alert and cooperative; normal mood and affect; normal attention span and concentration.      Result Review :               Pertinent cardiac workup    1. Echo on 3/17/2020 ejection fraction 55% mild to moderate aortic regurgitation  2. Telemetry strips from 3/5/2020(first trip) atrial flutter          ECG 12 Lead    Date/Time: 5/10/2022 9:30 AM  Performed by: Robert Brown MD  Authorized by: Robert Brown MD   Comparison: compared with previous ECG   Similar to previous ECG  Rhythm: sinus rhythm  Rate: normal  BPM: 61  Conduction: 1st degree AV block  ST Segments: ST segments normal  T Waves: T waves normal  QRS axis: normal                  Assessment and Plan      Filipe Altamirano is a 72-year-old male patient who has history of atrial flutter and possibly atrial fibrillation, paroxysmal, here today for follow-up.  Patient denies having any anginal symptoms or CHF symptoms, he is active without any limitations of his activity level.  His rhythm is stable sinus with first-degree AV block unchanged from his previous EKG.  Patient is not interested in any invasive measures at this time therefore we will continue same therapy with metoprolol and Coumadin and I will see him in follow-up in 6 months.    Diagnoses and all orders for this visit:    1. Atrial flutter with rapid ventricular response (HCC) (Primary)    2. Long term (current) use of anticoagulants    3. Essential hypertension    4. Paroxysmal atrial fibrillation (HCC)    Other orders  -     ECG 12 Lead           Return in about 6 months (around  11/10/2022).  Patient was given instructions and counseling regarding his condition or for health maintenance advice. Please see specific information pulled into the AVS if appropriate.

## 2022-06-22 ENCOUNTER — ANTICOAGULATION VISIT (OUTPATIENT)
Dept: CARDIOLOGY | Facility: CLINIC | Age: 72
End: 2022-06-22

## 2022-06-22 VITALS
DIASTOLIC BLOOD PRESSURE: 73 MMHG | BODY MASS INDEX: 33.37 KG/M2 | HEART RATE: 58 BPM | WEIGHT: 267 LBS | SYSTOLIC BLOOD PRESSURE: 135 MMHG

## 2022-06-22 DIAGNOSIS — Z79.01 LONG TERM (CURRENT) USE OF ANTICOAGULANTS: ICD-10-CM

## 2022-06-22 DIAGNOSIS — I48.91 ATRIAL FIBRILLATION, UNSPECIFIED TYPE: Primary | ICD-10-CM

## 2022-06-22 LAB — INR PPP: 2.8 (ref 0.9–1.1)

## 2022-06-22 PROCEDURE — 85610 PROTHROMBIN TIME: CPT | Performed by: INTERNAL MEDICINE

## 2022-06-22 PROCEDURE — 36416 COLLJ CAPILLARY BLOOD SPEC: CPT | Performed by: INTERNAL MEDICINE

## 2022-07-29 ENCOUNTER — ANTICOAGULATION VISIT (OUTPATIENT)
Dept: CARDIOLOGY | Facility: CLINIC | Age: 72
End: 2022-07-29

## 2022-07-29 VITALS
HEART RATE: 63 BPM | WEIGHT: 268 LBS | SYSTOLIC BLOOD PRESSURE: 127 MMHG | DIASTOLIC BLOOD PRESSURE: 78 MMHG | BODY MASS INDEX: 33.5 KG/M2

## 2022-07-29 DIAGNOSIS — Z79.01 LONG TERM (CURRENT) USE OF ANTICOAGULANTS: ICD-10-CM

## 2022-07-29 DIAGNOSIS — I48.0 PAROXYSMAL ATRIAL FIBRILLATION: Primary | ICD-10-CM

## 2022-07-29 LAB — INR PPP: 1.8 (ref 0.9–1.1)

## 2022-07-29 PROCEDURE — 36416 COLLJ CAPILLARY BLOOD SPEC: CPT | Performed by: INTERNAL MEDICINE

## 2022-07-29 PROCEDURE — 85610 PROTHROMBIN TIME: CPT | Performed by: INTERNAL MEDICINE

## 2022-07-29 NOTE — PROGRESS NOTES
Pt had adjusted dosage of Warfarin due to treatment of Covid with a round of antivirals. He has been back on current dosage for past two days. Recheck in a month. AmandaN

## 2022-08-19 DIAGNOSIS — I48.91 ATRIAL FIBRILLATION WITH RAPID VENTRICULAR RESPONSE: ICD-10-CM

## 2022-08-19 DIAGNOSIS — Z79.01 LONG TERM (CURRENT) USE OF ANTICOAGULANTS: ICD-10-CM

## 2022-08-19 RX ORDER — WARFARIN SODIUM 5 MG/1
TABLET ORAL
Qty: 180 TABLET | Refills: 0 | Status: SHIPPED | OUTPATIENT
Start: 2022-08-19 | End: 2022-11-22

## 2022-08-19 NOTE — TELEPHONE ENCOUNTER
Caller: Filipe Altamirano    Relationship: Self    Best call back number: [phone]    Requested Prescriptions:   Requested Prescriptions     Pending Prescriptions Disp Refills   • warfarin (COUMADIN) 5 MG tablet 180 tablet 0     Sig: TAKE 2 TABLETS BY MOUTH DAILY ON MONDAY Wednesday AND FRIDAY; TAKE 1 AND 1/2 TABLETS BY MOUTH ALL OTHER DAYS, OR AS DIRECTED        [facility] [place] [address] - [phone] [facility] [phone] [facility]       Does the patient have less than a 3 day supply:  [x] Yes  [] No    Shade Duffy Rep   08/19/22 12:12 EDT           
Rx Refill Note  Requested Prescriptions     Pending Prescriptions Disp Refills   • warfarin (COUMADIN) 5 MG tablet 180 tablet 0     Sig: TAKE 2 TABLETS BY MOUTH DAILY ON MONDAY Wednesday AND FRIDAY; TAKE 1 AND 1/2 TABLETS BY MOUTH ALL OTHER DAYS, OR AS DIRECTED      Last office visit with prescribing clinician: 5/10/2022      Next office visit with prescribing clinician: 11/8/2022              Anticoagulation Visit (07/29/2022)      Priyanka Castro LPN  08/19/22, 14:03 EDT  
actual

## 2022-09-07 ENCOUNTER — ANTICOAGULATION VISIT (OUTPATIENT)
Dept: CARDIOLOGY | Facility: CLINIC | Age: 72
End: 2022-09-07

## 2022-09-07 VITALS
WEIGHT: 270 LBS | SYSTOLIC BLOOD PRESSURE: 132 MMHG | HEART RATE: 59 BPM | BODY MASS INDEX: 33.75 KG/M2 | DIASTOLIC BLOOD PRESSURE: 75 MMHG

## 2022-09-07 DIAGNOSIS — I48.0 PAROXYSMAL ATRIAL FIBRILLATION: Primary | ICD-10-CM

## 2022-09-07 DIAGNOSIS — Z79.01 LONG TERM (CURRENT) USE OF ANTICOAGULANTS: ICD-10-CM

## 2022-09-07 LAB — INR PPP: 2.6 (ref 0.9–1.1)

## 2022-09-07 PROCEDURE — 36416 COLLJ CAPILLARY BLOOD SPEC: CPT | Performed by: INTERNAL MEDICINE

## 2022-09-07 PROCEDURE — 85610 PROTHROMBIN TIME: CPT | Performed by: INTERNAL MEDICINE

## 2022-10-20 ENCOUNTER — ANTICOAGULATION VISIT (OUTPATIENT)
Dept: CARDIOLOGY | Facility: CLINIC | Age: 72
End: 2022-10-20

## 2022-10-20 VITALS
HEART RATE: 56 BPM | BODY MASS INDEX: 33.5 KG/M2 | SYSTOLIC BLOOD PRESSURE: 152 MMHG | WEIGHT: 268 LBS | DIASTOLIC BLOOD PRESSURE: 94 MMHG

## 2022-10-20 DIAGNOSIS — I48.0 PAROXYSMAL ATRIAL FIBRILLATION: Primary | ICD-10-CM

## 2022-10-20 DIAGNOSIS — Z79.01 LONG TERM (CURRENT) USE OF ANTICOAGULANTS: ICD-10-CM

## 2022-10-20 LAB — INR PPP: 2.8 (ref 0.9–1.1)

## 2022-10-20 PROCEDURE — 85610 PROTHROMBIN TIME: CPT | Performed by: NURSE PRACTITIONER

## 2022-10-20 PROCEDURE — 36416 COLLJ CAPILLARY BLOOD SPEC: CPT | Performed by: NURSE PRACTITIONER

## 2022-11-08 ENCOUNTER — OFFICE VISIT (OUTPATIENT)
Dept: CARDIOLOGY | Facility: CLINIC | Age: 72
End: 2022-11-08

## 2022-11-08 VITALS
DIASTOLIC BLOOD PRESSURE: 70 MMHG | HEIGHT: 75 IN | OXYGEN SATURATION: 94 % | SYSTOLIC BLOOD PRESSURE: 140 MMHG | WEIGHT: 274 LBS | HEART RATE: 53 BPM | BODY MASS INDEX: 34.07 KG/M2

## 2022-11-08 DIAGNOSIS — Z79.01 LONG TERM (CURRENT) USE OF ANTICOAGULANTS: ICD-10-CM

## 2022-11-08 DIAGNOSIS — I48.0 PAROXYSMAL ATRIAL FIBRILLATION: Primary | ICD-10-CM

## 2022-11-08 DIAGNOSIS — I48.92 ATRIAL FLUTTER WITH RAPID VENTRICULAR RESPONSE: ICD-10-CM

## 2022-11-08 DIAGNOSIS — I10 ESSENTIAL HYPERTENSION: Chronic | ICD-10-CM

## 2022-11-08 PROCEDURE — 99214 OFFICE O/P EST MOD 30 MIN: CPT | Performed by: INTERNAL MEDICINE

## 2022-11-08 PROCEDURE — 93000 ELECTROCARDIOGRAM COMPLETE: CPT | Performed by: INTERNAL MEDICINE

## 2022-11-08 RX ORDER — LEVOTHYROXINE SODIUM 0.1 MG/1
1 TABLET ORAL DAILY
COMMUNITY

## 2022-11-08 NOTE — PROGRESS NOTES
Progress note      Name: Filipe Altamirano ADMIT: (Not on file)   : 1950  PCP: Frantz Saleem MD    MRN: [id number] LOS: 0 days   AGE/SEX: 72 y.o. male  ROOM: Room/bed info not found     Chief Complaint   Patient presents with   • Atrial Flutter       Subjective       History of present illness  Filipe Altamirano is a 72-year-old male patient was history of paroxysmal atrial fibrillation diagnosed initially in , here today for follow-up.  Patient is doing well denies any chest pain or shortness of breath, he does notice his heart rate being in the 50s most of the time, otherwise no syncopal episodes no lower extremity edema, no recent hospitalizations.    Past Medical History:   Diagnosis Date   • Atrial fibrillation (HCC)    • Disease of thyroid gland    • Hyperlipidemia    • Hypertension    • Vertigo      Past Surgical History:   Procedure Laterality Date   • ABDOMINAL SURGERY     • COLON SURGERY     • HERNIA REPAIR       Family History   Problem Relation Age of Onset   • No Known Problems Mother    • No Known Problems Father    • Arrhythmia Brother      Social History     Tobacco Use   • Smoking status: Former     Packs/day: 0.00     Years: 10.00     Pack years: 0.00     Types: Cigarettes     Start date: 6/15/1969     Quit date: 6/15/1979     Years since quittin.4     Passive exposure: Never   • Smokeless tobacco: Never   • Tobacco comments:     While in [employer]   Vaping Use   • Vaping Use: Never used   Substance Use Topics   • Alcohol use: No   • Drug use: No     (Not in a hospital admission)    Allergies:  Codeine and Diltiazem      Physical Exam  VITALS REVIEWED    General:      well developed, in no acute distress.    Head:      normocephalic and atraumatic.    Eyes:      PERRL/EOM intact, conjunctiva and sclera clear with out nystagmus.    Neck:      no masses, thyromegaly,  trachea central with normal respiratory effort and PMI displaced laterally  Lungs:      Clear to auscultation  bilaterally  Heart:       Regular rate and rhythm  Msk:      no deformity or scoliosis noted of thoracic or lumbar spine.    Pulses:      pulses normal in all 4 extremities.    Extremities:       No lower extremity edema  Neurologic:      no focal deficits.   alert oriented x3  Skin:      intact without lesions or rashes.    Psych:      alert and cooperative; normal mood and affect; normal attention span and concentration.      Result Review :               Pertinent cardiac workup    1. Echo on 3/17/2020 ejection fraction 55% mild to moderate aortic regurgitation  2. Telemetry strips from 3/5/2020(first trip) atrial flutter        ECG 12 Lead    Date/Time: 11/8/2022 9:45 AM  Performed by: Robert Brown MD  Authorized by: Robert Brown MD   Comparison: compared with previous ECG   Similar to previous ECG  Rhythm: sinus bradycardia  Rate: normal  BPM: 51  Conduction: conduction normal  Conduction: 1st degree AV block  ST Segments: ST segments normal  QRS axis: normal  Other findings: non-specific ST-T wave changes                Assessment and Plan      Filipe Altamirano is a 72-year-old male patient was history of atrial fibrillation as well as atrial flutter diagnosed initially in 2020.  Patient has been on metoprolol, and Coumadin for stroke prevention, his [id number] score is 2.  He does not recall being in A. fib in the last year or so, his heart rate is a little slow generally but he is in sinus.  He does take metoprolol 25 3 times a day, I advised him to decrease metoprolol to twice a day due to his slow heart rate.  Otherwise continue same therapy, his blood pressure is well controlled.  We will see him in follow-up in 6 months or sooner if he has any issues.    Diagnoses and all orders for this visit:    1. Paroxysmal atrial fibrillation (HCC) (Primary)    2. Atrial flutter with rapid ventricular response (HCC)    3. Long term (current) use of anticoagulants    4. Essential hypertension    Other  orders  -     metoprolol tartrate (LOPRESSOR) 25 MG tablet; Take 1 tablet by mouth 2 (Two) Times a Day.  Dispense: 270 tablet; Refill: 3           Return in about 6 months (around 5/8/2023).  Patient was given instructions and counseling regarding his condition or for health maintenance advice. Please see specific information pulled into the AVS if appropriate.

## 2022-11-22 DIAGNOSIS — I48.91 ATRIAL FIBRILLATION WITH RAPID VENTRICULAR RESPONSE: ICD-10-CM

## 2022-11-22 DIAGNOSIS — Z79.01 LONG TERM (CURRENT) USE OF ANTICOAGULANTS: ICD-10-CM

## 2022-11-22 RX ORDER — WARFARIN SODIUM 5 MG/1
TABLET ORAL
Qty: 180 TABLET | Refills: 0 | Status: SHIPPED | OUTPATIENT
Start: 2022-11-22 | End: 2023-03-03 | Stop reason: SDUPTHER

## 2022-11-22 NOTE — TELEPHONE ENCOUNTER
Rx Refill Note  Requested Prescriptions     Pending Prescriptions Disp Refills   • warfarin (COUMADIN) 5 MG tablet [Pharmacy Med Name: WARFARIN SODIUM 5 MG TABLET] 180 tablet 0     Sig: TAKE 2 TABLET BY MOUTH DAILY ON MONDAY, WEDNESDAY, AND FRIDAY THEN TAKE 1 AND 1/2 TABLETS DAILY ON ALL OTHER DAYS OR AS DIRECTED BY PRESCRIBER   Last INR 10/20/22  Last office visit with prescribing clinician: 11/8/2022      Next office visit with prescribing clinician: 5/9/2023            Lacy Salinas RN  11/22/22, 15:31 EST

## 2022-12-01 ENCOUNTER — ANTICOAGULATION VISIT (OUTPATIENT)
Dept: CARDIOLOGY | Facility: CLINIC | Age: 72
End: 2022-12-01

## 2022-12-01 VITALS — SYSTOLIC BLOOD PRESSURE: 162 MMHG | DIASTOLIC BLOOD PRESSURE: 76 MMHG | OXYGEN SATURATION: 98 % | HEART RATE: 59 BPM

## 2022-12-01 DIAGNOSIS — I48.0 PAROXYSMAL ATRIAL FIBRILLATION: Primary | ICD-10-CM

## 2022-12-01 DIAGNOSIS — Z79.01 LONG TERM (CURRENT) USE OF ANTICOAGULANTS: ICD-10-CM

## 2022-12-01 LAB — INR PPP: 3.1 (ref 0.9–1.1)

## 2022-12-01 PROCEDURE — 36416 COLLJ CAPILLARY BLOOD SPEC: CPT | Performed by: NURSE PRACTITIONER

## 2022-12-01 PROCEDURE — 85610 PROTHROMBIN TIME: CPT | Performed by: NURSE PRACTITIONER

## 2023-01-09 ENCOUNTER — ANTICOAGULATION VISIT (OUTPATIENT)
Dept: CARDIOLOGY | Facility: CLINIC | Age: 73
End: 2023-01-09
Payer: MEDICARE

## 2023-01-09 VITALS — DIASTOLIC BLOOD PRESSURE: 68 MMHG | SYSTOLIC BLOOD PRESSURE: 142 MMHG | HEART RATE: 57 BPM

## 2023-01-09 DIAGNOSIS — Z79.01 LONG TERM (CURRENT) USE OF ANTICOAGULANTS: ICD-10-CM

## 2023-01-09 DIAGNOSIS — I48.0 PAROXYSMAL ATRIAL FIBRILLATION: Primary | ICD-10-CM

## 2023-01-09 LAB — INR PPP: 2.7 (ref 0.9–1.1)

## 2023-01-09 PROCEDURE — 85610 PROTHROMBIN TIME: CPT | Performed by: NURSE PRACTITIONER

## 2023-01-09 PROCEDURE — 36416 COLLJ CAPILLARY BLOOD SPEC: CPT | Performed by: NURSE PRACTITIONER

## 2023-01-23 ENCOUNTER — TRANSCRIBE ORDERS (OUTPATIENT)
Dept: ADMINISTRATIVE | Facility: HOSPITAL | Age: 73
End: 2023-01-23
Payer: MEDICARE

## 2023-01-23 ENCOUNTER — LAB (OUTPATIENT)
Dept: LAB | Facility: HOSPITAL | Age: 73
End: 2023-01-23
Payer: MEDICARE

## 2023-01-23 ENCOUNTER — HOSPITAL ENCOUNTER (OUTPATIENT)
Dept: CARDIOLOGY | Facility: HOSPITAL | Age: 73
Discharge: HOME | End: 2023-01-23
Payer: MEDICARE

## 2023-01-23 DIAGNOSIS — Z01.818 PREOPERATIVE TESTING: Primary | ICD-10-CM

## 2023-01-23 DIAGNOSIS — Z01.818 PREOPERATIVE TESTING: ICD-10-CM

## 2023-01-23 LAB
ANION GAP SERPL CALCULATED.3IONS-SCNC: 11 MMOL/L (ref 5–15)
BUN SERPL-MCNC: 16 MG/DL (ref 8–23)
BUN/CREAT SERPL: 15.2 (ref 7–25)
CALCIUM SPEC-SCNC: 9.2 MG/DL (ref 8.6–10.5)
CHLORIDE SERPL-SCNC: 107 MMOL/L (ref 98–107)
CO2 SERPL-SCNC: 23 MMOL/L (ref 22–29)
CREAT SERPL-MCNC: 1.05 MG/DL (ref 0.76–1.27)
DEPRECATED RDW RBC AUTO: 42 FL (ref 37–54)
EGFRCR SERPLBLD CKD-EPI 2021: 74.5 ML/MIN/1.73
ERYTHROCYTE [DISTWIDTH] IN BLOOD BY AUTOMATED COUNT: 13.2 % (ref 12.3–15.4)
GLUCOSE SERPL-MCNC: 95 MG/DL (ref 65–99)
HCT VFR BLD AUTO: 44.4 % (ref 37.5–51)
HGB BLD-MCNC: 15 G/DL (ref 13–17.7)
MCH RBC QN AUTO: 30.1 PG (ref 26.6–33)
MCHC RBC AUTO-ENTMCNC: 33.8 G/DL (ref 31.5–35.7)
MCV RBC AUTO: 89.2 FL (ref 79–97)
PLATELET # BLD AUTO: 235 10*3/MM3 (ref 140–450)
PMV BLD AUTO: 10.2 FL (ref 6–12)
POTASSIUM SERPL-SCNC: 3.8 MMOL/L (ref 3.5–5.2)
QT INTERVAL: 406 MS
RBC # BLD AUTO: 4.98 10*6/MM3 (ref 4.14–5.8)
SODIUM SERPL-SCNC: 141 MMOL/L (ref 136–145)
WBC NRBC COR # BLD: 7.83 10*3/MM3 (ref 3.4–10.8)

## 2023-01-23 PROCEDURE — 80048 BASIC METABOLIC PNL TOTAL CA: CPT | Performed by: OPHTHALMOLOGY

## 2023-01-23 PROCEDURE — 36415 COLL VENOUS BLD VENIPUNCTURE: CPT | Performed by: OPHTHALMOLOGY

## 2023-01-23 PROCEDURE — 85027 COMPLETE CBC AUTOMATED: CPT | Performed by: OPHTHALMOLOGY

## 2023-01-23 PROCEDURE — 93010 ELECTROCARDIOGRAM REPORT: CPT | Performed by: INTERNAL MEDICINE

## 2023-01-23 PROCEDURE — 93005 ELECTROCARDIOGRAM TRACING: CPT | Performed by: OPHTHALMOLOGY

## 2023-01-23 RX ORDER — ROSUVASTATIN CALCIUM 5 MG/1
5 TABLET, COATED ORAL DAILY
COMMUNITY

## 2023-01-24 ENCOUNTER — ANESTHESIA (OUTPATIENT)
Dept: PERIOP | Facility: HOSPITAL | Age: 73
End: 2023-01-24
Payer: MEDICARE

## 2023-01-24 ENCOUNTER — ANESTHESIA EVENT (OUTPATIENT)
Dept: PERIOP | Facility: HOSPITAL | Age: 73
End: 2023-01-24
Payer: MEDICARE

## 2023-01-24 ENCOUNTER — HOSPITAL ENCOUNTER (OUTPATIENT)
Facility: HOSPITAL | Age: 73
Setting detail: HOSPITAL OUTPATIENT SURGERY
Discharge: HOME | End: 2023-01-24
Attending: OPHTHALMOLOGY | Admitting: OPHTHALMOLOGY
Payer: MEDICARE

## 2023-01-24 VITALS
TEMPERATURE: 97.7 F | DIASTOLIC BLOOD PRESSURE: 67 MMHG | SYSTOLIC BLOOD PRESSURE: 105 MMHG | HEART RATE: 53 BPM | BODY MASS INDEX: 33.5 KG/M2 | WEIGHT: 261 LBS | OXYGEN SATURATION: 97 % | HEIGHT: 74 IN | RESPIRATION RATE: 17 BRPM

## 2023-01-24 LAB
APTT PPP: 33.4 SECONDS (ref 24–31)
INR PPP: 1.97 (ref 2–3)
PROTHROMBIN TIME: 19.5 SECONDS (ref 19.4–28.5)

## 2023-01-24 PROCEDURE — 85730 THROMBOPLASTIN TIME PARTIAL: CPT | Performed by: OPHTHALMOLOGY

## 2023-01-24 PROCEDURE — 85610 PROTHROMBIN TIME: CPT | Performed by: OPHTHALMOLOGY

## 2023-01-24 RX ORDER — SODIUM CHLORIDE, SODIUM LACTATE, POTASSIUM CHLORIDE, CALCIUM CHLORIDE 600; 310; 30; 20 MG/100ML; MG/100ML; MG/100ML; MG/100ML
20 INJECTION, SOLUTION INTRAVENOUS CONTINUOUS
Status: DISCONTINUED | OUTPATIENT
Start: 2023-01-24 | End: 2023-02-28 | Stop reason: HOSPADM

## 2023-01-24 RX ORDER — CYCLOPENTOLATE HYDROCHLORIDE 10 MG/ML
1 SOLUTION/ DROPS OPHTHALMIC
Status: COMPLETED | OUTPATIENT
Start: 2023-01-24 | End: 2023-01-24

## 2023-01-24 RX ORDER — CIPROFLOXACIN HYDROCHLORIDE 3.5 MG/ML
1 SOLUTION/ DROPS TOPICAL
Status: COMPLETED | OUTPATIENT
Start: 2023-01-24 | End: 2023-01-24

## 2023-01-24 RX ORDER — PHENYLEPHRINE HCL 2.5 %
1 DROPS OPHTHALMIC (EYE)
Status: COMPLETED | OUTPATIENT
Start: 2023-01-24 | End: 2023-01-24

## 2023-01-24 RX ADMIN — CIPROFLOXACIN 1 DROP: 3 SOLUTION OPHTHALMIC at 12:00

## 2023-01-24 RX ADMIN — CYCLOPENTOLATE HYDROCHLORIDE 1 DROP: 10 SOLUTION OPHTHALMIC at 11:56

## 2023-01-24 RX ADMIN — PHENYLEPHRINE HYDROCHLORIDE 1 DROP: 25 SOLUTION/ DROPS OPHTHALMIC at 12:03

## 2023-01-24 RX ADMIN — PHENYLEPHRINE HYDROCHLORIDE 1 DROP: 25 SOLUTION/ DROPS OPHTHALMIC at 12:02

## 2023-01-24 RX ADMIN — CYCLOPENTOLATE HYDROCHLORIDE 1 DROP: 10 SOLUTION OPHTHALMIC at 11:54

## 2023-01-24 RX ADMIN — PHENYLEPHRINE HYDROCHLORIDE 1 DROP: 25 SOLUTION/ DROPS OPHTHALMIC at 12:01

## 2023-01-24 RX ADMIN — CIPROFLOXACIN 1 DROP: 3 SOLUTION OPHTHALMIC at 11:58

## 2023-01-24 RX ADMIN — CYCLOPENTOLATE HYDROCHLORIDE 1 DROP: 10 SOLUTION OPHTHALMIC at 11:55

## 2023-01-24 RX ADMIN — SODIUM CHLORIDE, POTASSIUM CHLORIDE, SODIUM LACTATE AND CALCIUM CHLORIDE 20 ML/HR: 600; 310; 30; 20 INJECTION, SOLUTION INTRAVENOUS at 12:35

## 2023-01-24 RX ADMIN — CIPROFLOXACIN 1 DROP: 3 SOLUTION OPHTHALMIC at 11:59

## 2023-02-08 ENCOUNTER — ANTICOAGULATION VISIT (OUTPATIENT)
Dept: CARDIOLOGY | Facility: CLINIC | Age: 73
End: 2023-02-08
Payer: MEDICARE

## 2023-02-08 VITALS
BODY MASS INDEX: 33.38 KG/M2 | SYSTOLIC BLOOD PRESSURE: 134 MMHG | DIASTOLIC BLOOD PRESSURE: 76 MMHG | HEART RATE: 49 BPM | WEIGHT: 260 LBS

## 2023-02-08 DIAGNOSIS — I48.0 PAROXYSMAL ATRIAL FIBRILLATION: Primary | ICD-10-CM

## 2023-02-08 DIAGNOSIS — Z79.01 LONG TERM (CURRENT) USE OF ANTICOAGULANTS: ICD-10-CM

## 2023-02-08 LAB — INR PPP: 2.5 (ref 0.9–1.1)

## 2023-02-08 PROCEDURE — 85610 PROTHROMBIN TIME: CPT | Performed by: NURSE PRACTITIONER

## 2023-02-08 PROCEDURE — 36416 COLLJ CAPILLARY BLOOD SPEC: CPT | Performed by: NURSE PRACTITIONER

## 2023-03-03 ENCOUNTER — TELEPHONE (OUTPATIENT)
Dept: CARDIOLOGY | Facility: CLINIC | Age: 73
End: 2023-03-03
Payer: MEDICARE

## 2023-03-03 DIAGNOSIS — I48.91 ATRIAL FIBRILLATION WITH RAPID VENTRICULAR RESPONSE: ICD-10-CM

## 2023-03-03 DIAGNOSIS — Z79.01 LONG TERM (CURRENT) USE OF ANTICOAGULANTS: ICD-10-CM

## 2023-03-03 RX ORDER — WARFARIN SODIUM 5 MG/1
TABLET ORAL
Qty: 180 TABLET | Refills: 0 | Status: SHIPPED | OUTPATIENT
Start: 2023-03-03

## 2023-03-03 NOTE — TELEPHONE ENCOUNTER
Rx Refill Note  Requested Prescriptions     Pending Prescriptions Disp Refills   • warfarin (COUMADIN) 5 MG tablet 180 tablet 0     Sig: TAKE 2 TABLET BY MOUTH DAILY ON MONDAY, WEDNESDAY, AND FRIDAY THEN TAKE 1 AND 1/2 TABLETS DAILY ON ALL OTHER DAYS OR AS DIRECTED BY PRESCRIBER    Last INR 2/8/23  Last office visit with prescribing clinician: 11/8/2022   Last telemedicine visit with prescribing clinician: 3/14/2023   Next office visit with prescribing clinician: 5/9/2023                         Would you like a call back once the refill request has been completed: [] Yes [] No    If the office needs to give you a call back, can they leave a voicemail: [] Yes [] No    Lacy Salinas RN  03/03/23, 10:01 EST

## 2023-03-03 NOTE — TELEPHONE ENCOUNTER
Incoming Refill Request      Medication requested (name and dose): WARFARIN 5 MG    [facility]arm[facility] where [facility] be sent: [facility]    Additional details provided by patient:     Best call back number:[phone]  Does the patient have less than a 3 day supply:  [] Yes  [x] No    Fe Juniored Rep  03/03/23, 09:43 EST

## 2023-03-14 ENCOUNTER — ANTICOAGULATION VISIT (OUTPATIENT)
Dept: CARDIOLOGY | Facility: CLINIC | Age: 73
End: 2023-03-14
Payer: MEDICARE

## 2023-03-14 VITALS
HEART RATE: 51 BPM | WEIGHT: 254 LBS | SYSTOLIC BLOOD PRESSURE: 140 MMHG | DIASTOLIC BLOOD PRESSURE: 81 MMHG | BODY MASS INDEX: 32.61 KG/M2

## 2023-03-14 DIAGNOSIS — I48.0 PAROXYSMAL ATRIAL FIBRILLATION: Primary | ICD-10-CM

## 2023-03-14 DIAGNOSIS — Z79.01 LONG TERM (CURRENT) USE OF ANTICOAGULANTS: ICD-10-CM

## 2023-03-14 LAB — INR PPP: 4.2 (ref 0.9–1.1)

## 2023-03-14 PROCEDURE — 85610 PROTHROMBIN TIME: CPT | Performed by: INTERNAL MEDICINE

## 2023-03-14 PROCEDURE — 36416 COLLJ CAPILLARY BLOOD SPEC: CPT | Performed by: INTERNAL MEDICINE

## 2023-03-29 ENCOUNTER — ANTICOAGULATION VISIT (OUTPATIENT)
Dept: CARDIOLOGY | Facility: CLINIC | Age: 73
End: 2023-03-29
Payer: MEDICARE

## 2023-03-29 VITALS
SYSTOLIC BLOOD PRESSURE: 138 MMHG | DIASTOLIC BLOOD PRESSURE: 71 MMHG | WEIGHT: 253 LBS | HEART RATE: 55 BPM | BODY MASS INDEX: 32.48 KG/M2

## 2023-03-29 DIAGNOSIS — I48.0 PAROXYSMAL ATRIAL FIBRILLATION: Primary | ICD-10-CM

## 2023-03-29 DIAGNOSIS — Z79.01 LONG TERM (CURRENT) USE OF ANTICOAGULANTS: ICD-10-CM

## 2023-03-29 LAB — INR PPP: 2.5 (ref 0.9–1.1)

## 2023-05-09 ENCOUNTER — ANTICOAGULATION VISIT (OUTPATIENT)
Dept: CARDIOLOGY | Facility: CLINIC | Age: 73
End: 2023-05-09
Payer: MEDICARE

## 2023-05-09 ENCOUNTER — OFFICE VISIT (OUTPATIENT)
Dept: CARDIOLOGY | Facility: CLINIC | Age: 73
End: 2023-05-09
Payer: MEDICARE

## 2023-05-09 VITALS
DIASTOLIC BLOOD PRESSURE: 71 MMHG | HEART RATE: 48 BPM | WEIGHT: 254 LBS | OXYGEN SATURATION: 97 % | SYSTOLIC BLOOD PRESSURE: 118 MMHG | BODY MASS INDEX: 32.6 KG/M2 | HEIGHT: 74 IN

## 2023-05-09 VITALS
WEIGHT: 254 LBS | SYSTOLIC BLOOD PRESSURE: 121 MMHG | DIASTOLIC BLOOD PRESSURE: 82 MMHG | BODY MASS INDEX: 32.61 KG/M2 | HEART RATE: 72 BPM

## 2023-05-09 DIAGNOSIS — I48.0 PAROXYSMAL ATRIAL FIBRILLATION: Primary | ICD-10-CM

## 2023-05-09 DIAGNOSIS — Z79.01 LONG TERM (CURRENT) USE OF ANTICOAGULANTS: ICD-10-CM

## 2023-05-09 DIAGNOSIS — I10 ESSENTIAL HYPERTENSION: Primary | Chronic | ICD-10-CM

## 2023-05-09 DIAGNOSIS — E78.2 MIXED HYPERLIPIDEMIA: ICD-10-CM

## 2023-05-09 DIAGNOSIS — I48.0 PAROXYSMAL ATRIAL FIBRILLATION: ICD-10-CM

## 2023-05-09 LAB — INR PPP: 3 (ref 2–3)

## 2023-05-09 PROCEDURE — 85610 PROTHROMBIN TIME: CPT | Performed by: INTERNAL MEDICINE

## 2023-05-09 PROCEDURE — 36416 COLLJ CAPILLARY BLOOD SPEC: CPT | Performed by: INTERNAL MEDICINE

## 2023-05-09 NOTE — PROGRESS NOTES
Progress note      Name: Filipe Altamirano ADMIT: (Not on file)   : 1950  PCP: Frantz Saleem MD    MRN: [id number] LOS: 0 days   AGE/SEX: 73 y.o. male  ROOM: Room/bed info not found     Chief Complaint   Patient presents with   • Atrial Fibrillation       Subjective       History of present illness  Filipe Altamirano  is a 73-year-old male patient was history of paroxysmal atrial fibrillation diagnosed initially in , here today for follow-up.  Patient is doing well denies any chest pain or shortness of breath, he does notice his heart rate being in the 50s most of the time, otherwise no syncopal episodes no lower extremity edema, no recent hospitalizations.       Past Medical History:   Diagnosis Date   • Atrial fibrillation    • Disease of thyroid gland    • Hard of hearing    • Hyperlipidemia    • Hypertension    • PONV (postoperative nausea and vomiting)    • Vertigo      Past Surgical History:   Procedure Laterality Date   • ABDOMINAL SURGERY     • APPENDECTOMY     • CATARACT EXTRACTION Right    • COLON SURGERY     • HERNIA REPAIR       Family History   Problem Relation Age of Onset   • No Known Problems Mother    • No Known Problems Father    • Arrhythmia Brother      Social History     Tobacco Use   • Smoking status: Former     Packs/day: 0.00     Years: 10.00     Pack years: 0.00     Types: Cigarettes     Start date: 6/15/1969     Quit date: 6/15/1979     Years since quittin.9     Passive exposure: Never   • Smokeless tobacco: Never   • Tobacco comments:     While in the [employer]   Vaping Use   • Vaping Use: Never used   Substance Use Topics   • Alcohol use: Yes     Alcohol/week: 1.0 standard drink     Types: 1 Glasses of wine per week     Comment: occasionally   • Drug use: No     (Not in a hospital admission)    Allergies:  Codeine      Physical Exam  VITALS REVIEWED    General:      well developed, in no acute distress.    Head:      normocephalic and atraumatic.    Eyes:      PERRL/EOM intact,  conjunctiva and sclera clear with out nystagmus.    Neck:      no masses, thyromegaly,  trachea central with normal respiratory effort and PMI displaced laterally  Lungs:      Clear to auscultation bilaterally  Heart:       Regular rate and rhythm, bradycardic  Msk:      no deformity or scoliosis noted of thoracic or lumbar spine.    Pulses:      pulses normal in all 4 extremities.    Extremities:       No lower extremity edema  Neurologic:      no focal deficits.   alert oriented x3  Skin:      intact without lesions or rashes.    Psych:      alert and cooperative; normal mood and affect; normal attention span and concentration.      Result Review :               Pertinent cardiac workup    1. Echo on 3/17/2020 ejection fraction 55% mild to moderate aortic regurgitation  2. Telemetry strips from 3/5/2020(first trip) atrial flutter         Procedures        Assessment and Plan      Filipe Altamirano  is a 72-year-old male patient was history of atrial fibrillation as well as atrial flutter diagnosed initially in 2020.  Patient has been on metoprolol, and Coumadin for stroke prevention, his [id number] score is 2.  He does not recall being in A. fib in the last year or so, his heart rate is a little slow generally but he is in sinus.    His metoprolol has been dropped to 25 mg twice a day, his heart rate is in the mid 40s but its been like that for a very long time.  Patient is asymptomatic from it.  For now continue same therapy, we will see him in follow-up in 8 months or sooner if he has any issues.    Diagnoses and all orders for this visit:    1. Essential hypertension (Primary)    2. Paroxysmal atrial fibrillation    3. Long term (current) use of anticoagulants    4. Mixed hyperlipidemia           Return in about 8 months (around 1/9/2024).  Patient was given instructions and counseling regarding his condition or for health maintenance advice. Please see specific information pulled into the AVS if appropriate.

## 2023-05-29 DIAGNOSIS — I48.91 ATRIAL FIBRILLATION WITH RAPID VENTRICULAR RESPONSE: ICD-10-CM

## 2023-05-29 DIAGNOSIS — Z79.01 LONG TERM (CURRENT) USE OF ANTICOAGULANTS: ICD-10-CM

## 2023-05-30 RX ORDER — WARFARIN SODIUM 5 MG/1
TABLET ORAL
Qty: 140 TABLET | Refills: 0 | Status: SHIPPED | OUTPATIENT
Start: 2023-05-30

## 2023-05-30 NOTE — TELEPHONE ENCOUNTER
Rx Refill Note  Requested Prescriptions     Pending Prescriptions Disp Refills   • warfarin (COUMADIN) 5 MG tablet [Pharmacy Med Name: WARFARIN SODIUM 5 MG TABLET] 140 tablet 0     Sig: Take 1 1/2 tabs, by mouth, daily or as directed.    Last INR 5/9/23  Last office visit with prescribing clinician: 5/9/2023   Last telemedicine visit with prescribing clinician: Visit date not found   Next office visit with prescribing clinician: 1/9/2024                         Would you like a call back once the refill request has been completed: [] Yes [] No    If the office needs to give you a call back, can they leave a voicemail: [] Yes [] No    Lacy Salinas RN  05/30/23, 09:29 EDT

## 2023-07-27 ENCOUNTER — ANTICOAGULATION VISIT (OUTPATIENT)
Dept: CARDIOLOGY | Facility: CLINIC | Age: 73
End: 2023-07-27
Payer: MEDICARE

## 2023-07-27 VITALS
DIASTOLIC BLOOD PRESSURE: 70 MMHG | BODY MASS INDEX: 31.46 KG/M2 | SYSTOLIC BLOOD PRESSURE: 132 MMHG | WEIGHT: 245 LBS | HEART RATE: 64 BPM

## 2023-07-27 DIAGNOSIS — I48.0 PAROXYSMAL ATRIAL FIBRILLATION: Primary | ICD-10-CM

## 2023-07-27 DIAGNOSIS — Z79.01 LONG TERM (CURRENT) USE OF ANTICOAGULANTS: ICD-10-CM

## 2023-07-27 LAB — INR PPP: 4.7 (ref 2–3)

## 2023-07-27 PROCEDURE — 85610 PROTHROMBIN TIME: CPT | Performed by: INTERNAL MEDICINE

## 2023-07-27 PROCEDURE — 36416 COLLJ CAPILLARY BLOOD SPEC: CPT | Performed by: INTERNAL MEDICINE

## 2023-08-11 ENCOUNTER — ANTICOAGULATION VISIT (OUTPATIENT)
Dept: CARDIOLOGY | Facility: CLINIC | Age: 73
End: 2023-08-11
Payer: MEDICARE

## 2023-08-11 VITALS
BODY MASS INDEX: 31.71 KG/M2 | SYSTOLIC BLOOD PRESSURE: 143 MMHG | DIASTOLIC BLOOD PRESSURE: 71 MMHG | WEIGHT: 247 LBS | HEART RATE: 67 BPM

## 2023-08-11 DIAGNOSIS — Z79.01 LONG TERM (CURRENT) USE OF ANTICOAGULANTS: ICD-10-CM

## 2023-08-11 DIAGNOSIS — I48.0 PAROXYSMAL ATRIAL FIBRILLATION: Primary | ICD-10-CM

## 2023-08-11 LAB — INR PPP: 1.1 (ref 2–3)

## 2023-08-11 PROCEDURE — 36416 COLLJ CAPILLARY BLOOD SPEC: CPT | Performed by: INTERNAL MEDICINE

## 2023-08-11 PROCEDURE — 85610 PROTHROMBIN TIME: CPT | Performed by: INTERNAL MEDICINE

## 2023-08-25 ENCOUNTER — ANTICOAGULATION VISIT (OUTPATIENT)
Dept: CARDIOLOGY | Facility: CLINIC | Age: 73
End: 2023-08-25
Payer: MEDICARE

## 2023-08-25 VITALS
BODY MASS INDEX: 30.81 KG/M2 | DIASTOLIC BLOOD PRESSURE: 70 MMHG | SYSTOLIC BLOOD PRESSURE: 135 MMHG | WEIGHT: 240 LBS | HEART RATE: 56 BPM

## 2023-08-25 DIAGNOSIS — I48.0 PAROXYSMAL ATRIAL FIBRILLATION: Primary | ICD-10-CM

## 2023-08-25 DIAGNOSIS — Z79.01 LONG TERM (CURRENT) USE OF ANTICOAGULANTS: ICD-10-CM

## 2023-08-25 LAB — INR PPP: 3 (ref 2–3)

## 2023-08-25 PROCEDURE — 36416 COLLJ CAPILLARY BLOOD SPEC: CPT | Performed by: INTERNAL MEDICINE

## 2023-08-25 PROCEDURE — 85610 PROTHROMBIN TIME: CPT | Performed by: INTERNAL MEDICINE

## 2023-08-27 DIAGNOSIS — I48.91 ATRIAL FIBRILLATION WITH RAPID VENTRICULAR RESPONSE: ICD-10-CM

## 2023-08-27 DIAGNOSIS — Z79.01 LONG TERM (CURRENT) USE OF ANTICOAGULANTS: ICD-10-CM

## 2023-08-28 RX ORDER — WARFARIN SODIUM 5 MG/1
TABLET ORAL
Qty: 136 TABLET | Refills: 0 | Status: SHIPPED | OUTPATIENT
Start: 2023-08-28

## 2023-08-28 NOTE — TELEPHONE ENCOUNTER
Rx Refill Note  Requested Prescriptions     Pending Prescriptions Disp Refills    warfarin (COUMADIN) 5 MG tablet [Pharmacy Med Name: WARFARIN SODIUM 5 MG TABLET] 136 tablet 0     Sig: TAKE 1 AND 1/2 TABLET BY MOUTH DAILY AS DIRECTED    Last INR 8/25/23  Last office visit with prescribing clinician: 5/9/2023   Last telemedicine visit with prescribing clinician: Visit date not found   Next office visit with prescribing clinician: 1/9/2024                         Would you like a call back once the refill request has been completed: [] Yes [] No    If the office needs to give you a call back, can they leave a voicemail: [] Yes [] No    Lacy Salinas RN  08/28/23, 14:18 EDT

## 2023-09-19 ENCOUNTER — APPOINTMENT (OUTPATIENT)
Dept: CT IMAGING | Facility: HOSPITAL | Age: 73
End: 2023-09-19
Payer: MEDICARE

## 2023-09-19 ENCOUNTER — HOSPITAL ENCOUNTER (OUTPATIENT)
Facility: HOSPITAL | Age: 73
Setting detail: OBSERVATION
Discharge: HOME | End: 2023-09-20
Attending: EMERGENCY MEDICINE | Admitting: INTERNAL MEDICINE
Payer: MEDICARE

## 2023-09-19 DIAGNOSIS — R42 DIZZINESS: Primary | ICD-10-CM

## 2023-09-19 LAB
ALBUMIN SERPL-MCNC: 4.5 G/DL (ref 3.5–5.2)
ALBUMIN/GLOB SERPL: 1.7 G/DL
ALP SERPL-CCNC: 78 U/L (ref 39–117)
ALT SERPL W P-5'-P-CCNC: 25 U/L (ref 1–41)
ANION GAP SERPL CALCULATED.3IONS-SCNC: 10 MMOL/L (ref 5–15)
AST SERPL-CCNC: 24 U/L (ref 1–40)
B PARAPERT DNA SPEC QL NAA+PROBE: NOT DETECTED
B PERT DNA SPEC QL NAA+PROBE: NOT DETECTED
BASOPHILS # BLD AUTO: 0.1 10*3/MM3 (ref 0–0.2)
BASOPHILS NFR BLD AUTO: 1.2 % (ref 0–1.5)
BILIRUB SERPL-MCNC: 1 MG/DL (ref 0–1.2)
BILIRUB UR QL STRIP: NEGATIVE
BUN SERPL-MCNC: 12 MG/DL (ref 8–23)
BUN/CREAT SERPL: 13.8 (ref 7–25)
C PNEUM DNA NPH QL NAA+NON-PROBE: NOT DETECTED
CALCIUM SPEC-SCNC: 9.6 MG/DL (ref 8.6–10.5)
CHLORIDE SERPL-SCNC: 103 MMOL/L (ref 98–107)
CLARITY UR: CLEAR
CO2 SERPL-SCNC: 29 MMOL/L (ref 22–29)
COLOR UR: YELLOW
CREAT SERPL-MCNC: 0.87 MG/DL (ref 0.76–1.27)
DEPRECATED RDW RBC AUTO: 48.1 FL (ref 37–54)
EGFRCR SERPLBLD CKD-EPI 2021: 90 ML/MIN/1.73
EOSINOPHIL # BLD AUTO: 0.1 10*3/MM3 (ref 0–0.4)
EOSINOPHIL NFR BLD AUTO: 0.7 % (ref 0.3–6.2)
ERYTHROCYTE [DISTWIDTH] IN BLOOD BY AUTOMATED COUNT: 14.4 % (ref 12.3–15.4)
FLUAV SUBTYP SPEC NAA+PROBE: NOT DETECTED
FLUBV RNA ISLT QL NAA+PROBE: NOT DETECTED
GLOBULIN UR ELPH-MCNC: 2.6 GM/DL
GLUCOSE SERPL-MCNC: 110 MG/DL (ref 65–99)
GLUCOSE UR STRIP-MCNC: NEGATIVE MG/DL
HADV DNA SPEC NAA+PROBE: NOT DETECTED
HCOV 229E RNA SPEC QL NAA+PROBE: NOT DETECTED
HCOV HKU1 RNA SPEC QL NAA+PROBE: NOT DETECTED
HCOV NL63 RNA SPEC QL NAA+PROBE: NOT DETECTED
HCOV OC43 RNA SPEC QL NAA+PROBE: NOT DETECTED
HCT VFR BLD AUTO: 44.1 % (ref 37.5–51)
HGB BLD-MCNC: 14.9 G/DL (ref 13–17.7)
HGB UR QL STRIP.AUTO: NEGATIVE
HMPV RNA NPH QL NAA+NON-PROBE: NOT DETECTED
HOLD SPECIMEN: NORMAL
HPIV1 RNA ISLT QL NAA+PROBE: NOT DETECTED
HPIV2 RNA SPEC QL NAA+PROBE: NOT DETECTED
HPIV3 RNA NPH QL NAA+PROBE: NOT DETECTED
HPIV4 P GENE NPH QL NAA+PROBE: NOT DETECTED
INR PPP: 3.68 (ref 2–3)
KETONES UR QL STRIP: ABNORMAL
LEUKOCYTE ESTERASE UR QL STRIP.AUTO: NEGATIVE
LIPASE SERPL-CCNC: 21 U/L (ref 13–60)
LYMPHOCYTES # BLD AUTO: 1.2 10*3/MM3 (ref 0.7–3.1)
LYMPHOCYTES NFR BLD AUTO: 16.3 % (ref 19.6–45.3)
M PNEUMO IGG SER IA-ACNC: NOT DETECTED
MCH RBC QN AUTO: 30.8 PG (ref 26.6–33)
MCHC RBC AUTO-ENTMCNC: 33.7 G/DL (ref 31.5–35.7)
MCV RBC AUTO: 91.2 FL (ref 79–97)
MONOCYTES # BLD AUTO: 0.5 10*3/MM3 (ref 0.1–0.9)
MONOCYTES NFR BLD AUTO: 6.9 % (ref 5–12)
NEUTROPHILS NFR BLD AUTO: 5.4 10*3/MM3 (ref 1.7–7)
NEUTROPHILS NFR BLD AUTO: 74.9 % (ref 42.7–76)
NITRITE UR QL STRIP: NEGATIVE
NRBC BLD AUTO-RTO: 0.1 /100 WBC (ref 0–0.2)
PH UR STRIP.AUTO: 8 [PH] (ref 5–8)
PLATELET # BLD AUTO: 200 10*3/MM3 (ref 140–450)
PMV BLD AUTO: 7.9 FL (ref 6–12)
POTASSIUM SERPL-SCNC: 4.3 MMOL/L (ref 3.5–5.2)
PROT SERPL-MCNC: 7.1 G/DL (ref 6–8.5)
PROT UR QL STRIP: NEGATIVE
PROTHROMBIN TIME: 36.6 SECONDS (ref 19.4–28.5)
RBC # BLD AUTO: 4.84 10*6/MM3 (ref 4.14–5.8)
RHINOVIRUS RNA SPEC NAA+PROBE: NOT DETECTED
RSV RNA NPH QL NAA+NON-PROBE: NOT DETECTED
SARS-COV-2 RNA NPH QL NAA+NON-PROBE: NOT DETECTED
SODIUM SERPL-SCNC: 142 MMOL/L (ref 136–145)
SP GR UR STRIP: 1.02 (ref 1–1.03)
T4 FREE SERPL-MCNC: 1.57 NG/DL (ref 0.93–1.7)
TSH SERPL DL<=0.05 MIU/L-ACNC: 1.24 UIU/ML (ref 0.27–4.2)
UROBILINOGEN UR QL STRIP: ABNORMAL
WBC NRBC COR # BLD: 7.3 10*3/MM3 (ref 3.4–10.8)

## 2023-09-19 PROCEDURE — 96374 THER/PROPH/DIAG INJ IV PUSH: CPT

## 2023-09-19 PROCEDURE — 70450 CT HEAD/BRAIN W/O DYE: CPT

## 2023-09-19 PROCEDURE — 84439 ASSAY OF FREE THYROXINE: CPT | Performed by: EMERGENCY MEDICINE

## 2023-09-19 PROCEDURE — 99285 EMERGENCY DEPT VISIT HI MDM: CPT

## 2023-09-19 PROCEDURE — 84443 ASSAY THYROID STIM HORMONE: CPT | Performed by: EMERGENCY MEDICINE

## 2023-09-19 PROCEDURE — G0378 HOSPITAL OBSERVATION PER HR: HCPCS

## 2023-09-19 PROCEDURE — 80053 COMPREHEN METABOLIC PANEL: CPT | Performed by: EMERGENCY MEDICINE

## 2023-09-19 PROCEDURE — 83690 ASSAY OF LIPASE: CPT | Performed by: EMERGENCY MEDICINE

## 2023-09-19 PROCEDURE — 0202U NFCT DS 22 TRGT SARS-COV-2: CPT | Performed by: EMERGENCY MEDICINE

## 2023-09-19 PROCEDURE — 70496 CT ANGIOGRAPHY HEAD: CPT

## 2023-09-19 PROCEDURE — 25010000002 DIPHENHYDRAMINE PER 50 MG: Performed by: EMERGENCY MEDICINE

## 2023-09-19 PROCEDURE — 85610 PROTHROMBIN TIME: CPT | Performed by: EMERGENCY MEDICINE

## 2023-09-19 PROCEDURE — 93005 ELECTROCARDIOGRAM TRACING: CPT | Performed by: EMERGENCY MEDICINE

## 2023-09-19 PROCEDURE — 85025 COMPLETE CBC W/AUTO DIFF WBC: CPT | Performed by: EMERGENCY MEDICINE

## 2023-09-19 PROCEDURE — 81003 URINALYSIS AUTO W/O SCOPE: CPT | Performed by: EMERGENCY MEDICINE

## 2023-09-19 PROCEDURE — 70498 CT ANGIOGRAPHY NECK: CPT

## 2023-09-19 PROCEDURE — 83036 HEMOGLOBIN GLYCOSYLATED A1C: CPT | Performed by: NURSE PRACTITIONER

## 2023-09-19 PROCEDURE — 25510000001 IOPAMIDOL PER 1 ML: Performed by: EMERGENCY MEDICINE

## 2023-09-19 RX ORDER — MECLIZINE HYDROCHLORIDE 25 MG/1
25 TABLET ORAL ONCE
Status: DISCONTINUED | OUTPATIENT
Start: 2023-09-19 | End: 2023-09-20 | Stop reason: HOSPADM

## 2023-09-19 RX ORDER — DIPHENHYDRAMINE HYDROCHLORIDE 50 MG/ML
12.5 INJECTION INTRAMUSCULAR; INTRAVENOUS ONCE
Status: COMPLETED | OUTPATIENT
Start: 2023-09-19 | End: 2023-09-19

## 2023-09-19 RX ORDER — SODIUM CHLORIDE 0.9 % (FLUSH) 0.9 %
10 SYRINGE (ML) INJECTION AS NEEDED
Status: DISCONTINUED | OUTPATIENT
Start: 2023-09-19 | End: 2023-09-20 | Stop reason: HOSPADM

## 2023-09-19 RX ADMIN — IOPAMIDOL 100 ML: 755 INJECTION, SOLUTION INTRAVENOUS at 23:43

## 2023-09-19 RX ADMIN — DIPHENHYDRAMINE HYDROCHLORIDE 12.5 MG: 50 INJECTION, SOLUTION INTRAMUSCULAR; INTRAVENOUS at 21:19

## 2023-09-19 NOTE — Clinical Note
Level of Care: Telemetry [5]   Diagnosis: Dizziness [388653]   Admitting Physician: PAYTON BLAKE [[id number]]   Attending Physician: PAYTON BLAKE [[id number]]

## 2023-09-20 ENCOUNTER — APPOINTMENT (OUTPATIENT)
Dept: MRI IMAGING | Facility: HOSPITAL | Age: 73
End: 2023-09-20
Payer: MEDICARE

## 2023-09-20 VITALS
HEART RATE: 66 BPM | RESPIRATION RATE: 13 BRPM | DIASTOLIC BLOOD PRESSURE: 68 MMHG | WEIGHT: 252.65 LBS | HEIGHT: 74 IN | BODY MASS INDEX: 32.42 KG/M2 | TEMPERATURE: 98.3 F | OXYGEN SATURATION: 94 % | SYSTOLIC BLOOD PRESSURE: 121 MMHG

## 2023-09-20 PROBLEM — R42 DIZZINESS: Status: ACTIVE | Noted: 2023-09-20

## 2023-09-20 LAB
CHOLEST SERPL-MCNC: 145 MG/DL (ref 0–200)
GLUCOSE BLDC GLUCOMTR-MCNC: 109 MG/DL (ref 70–105)
GLUCOSE BLDC GLUCOMTR-MCNC: 132 MG/DL (ref 70–105)
GLUCOSE BLDC GLUCOMTR-MCNC: 94 MG/DL (ref 70–105)
HBA1C MFR BLD: 5.9 % (ref 4.8–5.6)
HDLC SERPL-MCNC: 51 MG/DL (ref 40–60)
LDLC SERPL CALC-MCNC: 72 MG/DL (ref 0–100)
LDLC/HDLC SERPL: 1.36 {RATIO}
QT INTERVAL: 461 MS
QTC INTERVAL: 417 MS
TRIGL SERPL-MCNC: 122 MG/DL (ref 0–150)
TROPONIN T SERPL HS-MCNC: 15 NG/L
VLDLC SERPL-MCNC: 22 MG/DL (ref 5–40)

## 2023-09-20 PROCEDURE — 99214 OFFICE O/P EST MOD 30 MIN: CPT | Performed by: NURSE PRACTITIONER

## 2023-09-20 PROCEDURE — 84484 ASSAY OF TROPONIN QUANT: CPT | Performed by: EMERGENCY MEDICINE

## 2023-09-20 PROCEDURE — 82948 REAGENT STRIP/BLOOD GLUCOSE: CPT

## 2023-09-20 PROCEDURE — G0378 HOSPITAL OBSERVATION PER HR: HCPCS

## 2023-09-20 PROCEDURE — 80061 LIPID PANEL: CPT | Performed by: NURSE PRACTITIONER

## 2023-09-20 PROCEDURE — 70551 MRI BRAIN STEM W/O DYE: CPT

## 2023-09-20 PROCEDURE — 97162 PT EVAL MOD COMPLEX 30 MIN: CPT | Performed by: PHYSICAL THERAPIST

## 2023-09-20 RX ORDER — LOSARTAN POTASSIUM 25 MG/1
25 TABLET ORAL DAILY
Status: DISCONTINUED | OUTPATIENT
Start: 2023-09-20 | End: 2023-09-20 | Stop reason: HOSPADM

## 2023-09-20 RX ORDER — CALCIUM CARBONATE 500 MG/1
2 TABLET, CHEWABLE ORAL EVERY 4 HOURS PRN
Status: DISCONTINUED | OUTPATIENT
Start: 2023-09-20 | End: 2023-09-20 | Stop reason: HOSPADM

## 2023-09-20 RX ORDER — SODIUM CHLORIDE 0.9 % (FLUSH) 0.9 %
10 SYRINGE (ML) INJECTION AS NEEDED
Status: DISCONTINUED | OUTPATIENT
Start: 2023-09-20 | End: 2023-09-20 | Stop reason: HOSPADM

## 2023-09-20 RX ORDER — ROSUVASTATIN CALCIUM 10 MG/1
5 TABLET, COATED ORAL DAILY
Status: DISCONTINUED | OUTPATIENT
Start: 2023-09-20 | End: 2023-09-20 | Stop reason: HOSPADM

## 2023-09-20 RX ORDER — METHYLPREDNISOLONE 4 MG/1
TABLET ORAL
Qty: 21 TABLET | Refills: 0 | Status: SHIPPED | OUTPATIENT
Start: 2023-09-20

## 2023-09-20 RX ORDER — MELATONIN
2000 DAILY
Status: DISCONTINUED | OUTPATIENT
Start: 2023-09-20 | End: 2023-09-20 | Stop reason: HOSPADM

## 2023-09-20 RX ORDER — SODIUM CHLORIDE 9 MG/ML
40 INJECTION, SOLUTION INTRAVENOUS AS NEEDED
Status: DISCONTINUED | OUTPATIENT
Start: 2023-09-20 | End: 2023-09-20 | Stop reason: HOSPADM

## 2023-09-20 RX ORDER — SODIUM CHLORIDE 0.9 % (FLUSH) 0.9 %
10 SYRINGE (ML) INJECTION EVERY 12 HOURS SCHEDULED
Status: DISCONTINUED | OUTPATIENT
Start: 2023-09-20 | End: 2023-09-20 | Stop reason: HOSPADM

## 2023-09-20 RX ORDER — LEVOTHYROXINE SODIUM 0.1 MG/1
100 TABLET ORAL
Status: DISCONTINUED | OUTPATIENT
Start: 2023-09-20 | End: 2023-09-20 | Stop reason: HOSPADM

## 2023-09-20 RX ORDER — WARFARIN SODIUM 5 MG/1
7.5 TABLET ORAL
COMMUNITY

## 2023-09-20 RX ADMIN — LOSARTAN POTASSIUM 25 MG: 25 TABLET, FILM COATED ORAL at 09:18

## 2023-09-20 RX ADMIN — ROSUVASTATIN 5 MG: 10 TABLET, FILM COATED ORAL at 09:18

## 2023-09-20 RX ADMIN — CALCIUM CARBONATE (ANTACID) CHEW TAB 500 MG 2 TABLET: 500 CHEW TAB at 06:21

## 2023-09-20 RX ADMIN — LEVOTHYROXINE SODIUM 100 MCG: 0.1 TABLET ORAL at 06:21

## 2023-09-20 RX ADMIN — Medication 2000 UNITS: at 09:18

## 2023-09-20 NOTE — PROGRESS NOTES
"Pharmacy dosing service  Anticoagulant  Warfarin     Subjective:    Filipe Altamirano is a 73 y.o.male being continued on warfarin for Atrial Fibrillation.    INR Goal: 2 - 3  Home medication?: warfarin 7.5 mg PO daily  Bridge Therapy Present?:  No      Assessment/Plan:    Today's INR is supratherapeutic (3.68) on home warfarin 7.5mg daily. Will hold today's dose and consider resuming warfarin at lower than home dose once INR therapeutic/is trending toward therapeutic.      Continue to monitor and adjust based on INR.         Date 9/20           INR 3.68           Dose Hold               Objective:  [Ht: 188 cm (74\"); Wt: 115 kg (252 lb 10.4 oz); BMI: Body mass index is 32.44 kg/m².]    Lab Results   Component Value Date    ALBUMIN 4.5 09/19/2023     Lab Results   Component Value Date    INR 3.68 (H) 09/19/2023    INR 3.00 08/25/2023    INR 1.10 (A) 08/11/2023    PROTIME 36.6 (H) 09/19/2023    PROTIME 19.5 01/24/2023    PROTIME 16.7 (L) 09/27/2020     Lab Results   Component Value Date    HGB 14.9 09/19/2023    HGB 15.0 01/23/2023    HGB 14.4 07/28/2020     Lab Results   Component Value Date    HCT 44.1 09/19/2023    HCT 44.4 01/23/2023    HCT 43.6 07/28/2020       Ninfa Bains PharmD  09/20/23 04:20 EDT   "

## 2023-09-20 NOTE — CASE MANAGEMENT/SOCIAL WORK
"Continued Stay Note  [facility]     Patient Name: Filipe Altamirano  MRN: [id number]  Today's Date: 9/20/2023    Admit Date: 9/19/2023    Plan: Home with family and outpatient PT for vestibular rehab. [facility] on [facility]. 10/5/23 @ 2:30   Discharge Plan       Row Name 09/20/23 1143       Plan    Plan Home with family and outpatient PT for vestibular rehab. [facility] on [facility]. 10/5/23 @ 2:30    Patient/Family in Agreement with Plan yes    Plan Comments I spoke with Mary Carmen at [facility]  ([facility]) on [facility]. She reports no appointments until sometime in October. I spoke with  Ellen at [facility] on [facility]. She reports 1st available appointment for patient is 10/5/23 at 1430. I confirmed with patient that he is ok with that appointment and offerred additional options of other agencies. He states he wants the appointment. Ellen informed.      Row Name 09/20/23 1021       Plan    Plan Home with family & outpatient PT for vestibular rehab (Order needed). Wants [facility] on [facility]    Plan Comments Pt reports he lives with spouse and is IADLs. 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 He confirms pcp and pharmacy and denies difficulty obtaining medications or transportation. Reports wife will provide transportation at discharge. PT is recommending outpatient vestibular rehab. Pt reports he has been to outpatient rehab at [facility], and would like to go there again. Per PT Olvin, they don't provide vestibular rehab. Pt Chose [facility]  location. Secure chat sent to Dr. Sampson requesting order for outpatient PT.Response pending. Face sheet sent to [facility] with notification of need for vestibular rehab and need order. DC Barriers: Neuro consult                   Discharge Codes    No documentation.                 Expected Discharge Date and Time  "      Expected Discharge Date Expected Discharge Time    Sep 21, 2023           Janae Ayala RN, [facility]  Office: [phone]  Fax: [phone]  [email or web address]      I met with patient in room wearing PPE: mask and glasses     Maintained distance greater than six feet and spent </=15 minutes in the room    Janae Ayala RN

## 2023-09-20 NOTE — CONSULTS
Primary Care Provider: No ref. provider found     Consult requested by:  Dr. Sampson     Reason for Consultation: Neurological evaluation, stroke     History taken from: patient chart    Chief complaint: Dizziness, balance problem        SUBJECTIVE:    History of present illness: Background per H&P:  Filipe Altamirano is a 73 y.o. male with past medical history of A-fib on warfarin, hypertension, hypothyroidism and a remote history of vertigo who presented to [facility] on 9/19/2023 complaining of dizziness     Patient reports was in normal state of health and went to bed and woke up around 3 AM to go to the restroom and noticed had difficulty with balance.  He reports symptoms were different than previous diagnosis of vertigo approximately 3 years ago.  He reports initially he was not nauseated but did have vomiting x1 on way to the hospital.  He reports unable to look up down left or right without eliciting dizziness.  He reports unsteady gait and difficulty with ambulation as well     In the ED CT head and neck negative for any acute intercranial abnormality.  Initial troponin is 15.  Electrolytes within normal limits.  No leukocytosis.  Urinalysis within normal limits.  Vital signs positive for bradycardia with drop into the 40s which patient reports is normal.  will have neurology consult.  MRI is pending to rule out CVA.  Will get PT eval.  Admitted for further observation    - Portions of the above HPI were copied from previous encounters and edited as appropriate. PMH as detailed below.         Review of Systems   Constitutional: Negative.    Eyes:  Positive for visual disturbance.   Respiratory: Negative.     Cardiovascular: Negative.    Gastrointestinal:  Positive for nausea and vomiting.   Endocrine: Negative.    Genitourinary: Negative.    Musculoskeletal: Negative.    Skin: Negative.    Allergic/Immunologic: Negative.    Neurological:  Positive for dizziness. Negative for tremors, seizures,  syncope, facial asymmetry, speech difficulty, weakness, light-headedness, numbness and headaches.   Hematological: Negative.    Psychiatric/Behavioral:  Negative for confusion.          PATIENT HISTORY:  Past Medical History:   Diagnosis Date    Atrial fibrillation     Disease of thyroid gland     Hard of hearing     Hyperlipidemia     Hypertension     PONV (postoperative nausea and vomiting)     Vertigo    ,   Past Surgical History:   Procedure Laterality Date    ABDOMINAL SURGERY      APPENDECTOMY      CATARACT EXTRACTION Right     COLON SURGERY      HERNIA REPAIR     ,   Family History   Problem Relation Age of Onset    No Known Problems Mother     No Known Problems Father     Arrhythmia Brother    ,   Social History     Tobacco Use    Smoking status: Former     Packs/day: 0.00     Years: 10.00     Pack years: 0.00     Types: Cigarettes     Start date: 6/15/1969     Quit date: 6/15/1979     Years since quittin.2     Passive exposure: Never    Smokeless tobacco: Never    Tobacco comments:     While in the [employer]   Vaping Use    Vaping Use: Never used   Substance Use Topics    Alcohol use: Yes     Alcohol/week: 1.0 standard drink     Types: 1 Glasses of wine per week     Comment: occasionally    Drug use: No   ,   Prior to Admission medications    Medication Sig Start Date End Date Taking? Authorizing Provider   Cholecalciferol (Vitamin D3) 50 MCG (2000 UT) capsule Take 1 capsule by mouth Daily.   Yes ProviderAustin MD   levothyroxine (SYNTHROID, LEVOTHROID) 100 MCG tablet Take 1 tablet by mouth Daily.   Yes ProviderAustin MD   losartan (COZAAR) 25 MG tablet Take 1 tablet by mouth Daily. 21  Yes Fracisco Villareal MD   metoprolol tartrate (LOPRESSOR) 25 MG tablet Take 1 tablet by mouth 2 (Two) Times a Day.   Yes ProviderAustin MD   rosuvastatin (CRESTOR) 5 MG tablet Take 1 tablet by mouth Daily.   Yes ProviderAustin MD   warfarin (COUMADIN) 5 MG tablet Take 1.5 tablets  by mouth Daily.   Yes Provider, MD Austin   metoprolol tartrate (LOPRESSOR) 25 MG tablet TAKE ONE TABLET BY MOUTH THREE TIMES A DAY  Patient taking differently: Take 1 tablet by mouth 2 (Two) Times a Day. 11/14/22 9/20/23 Yes Robert Brown MD   warfarin (COUMADIN) 5 MG tablet TAKE 1 AND 1/2 TABLET BY MOUTH DAILY AS DIRECTED 8/28/23 9/20/23 Yes Robert Brown MD    Allergies:  Codeine    Current Facility-Administered Medications   Medication Dose Route Frequency Provider Last Rate Last Admin    calcium carbonate (TUMS) chewable tablet 500 mg (200 mg elemental)  2 tablet Oral Q4H PRN Symone Palmer APRN   2 tablet at 09/20/23 0621    cholecalciferol (VITAMIN D3) tablet 2,000 Units  2,000 Units Oral Daily Symone Palmer APRN   2,000 Units at 09/20/23 0918    levothyroxine (SYNTHROID, LEVOTHROID) tablet 100 mcg  100 mcg Oral Q AM Symone Palmer APRN   100 mcg at 09/20/23 0621    losartan (COZAAR) tablet 25 mg  25 mg Oral Daily Symone Palmer APRN   25 mg at 09/20/23 0918    meclizine (ANTIVERT) tablet 25 mg  25 mg Oral Once Maco Espinosa MD        metoprolol tartrate (LOPRESSOR) tablet 25 mg  25 mg Oral BID Symone Palmer APRN        Pharmacy to dose warfarin   Does not apply Continuous PRN Symone Palmer APRN        rosuvastatin (CRESTOR) tablet 5 mg  5 mg Oral Daily Symone Palmer APRN   5 mg at 09/20/23 0918    sodium chloride 0.9 % flush 10 mL  10 mL Intravenous PRN Maco Espinosa MD        sodium chloride 0.9 % flush 10 mL  10 mL Intravenous Q12H Symone Palmer APRN        sodium chloride 0.9 % flush 10 mL  10 mL Intravenous PRN Symone Palmer APRN        sodium chloride 0.9 % infusion 40 mL  40 mL Intravenous PRN Symone Palmer APRN         Current Outpatient Medications   Medication Sig Dispense Refill    Cholecalciferol (Vitamin D3) 50 MCG (2000 UT) capsule Take 1 capsule by mouth Daily.      levothyroxine (SYNTHROID, LEVOTHROID) 100 MCG tablet Take 1 tablet by  mouth Daily.      losartan (COZAAR) 25 MG tablet Take 1 tablet by mouth Daily. 90 tablet 2    metoprolol tartrate (LOPRESSOR) 25 MG tablet Take 1 tablet by mouth 2 (Two) Times a Day.      rosuvastatin (CRESTOR) 5 MG tablet Take 1 tablet by mouth Daily.      warfarin (COUMADIN) 5 MG tablet Take 1.5 tablets by mouth Daily.          ________________________________________________________        OBJECTIVE:     PHYSICAL EXAM:    Constitutional: The patient is in no apparent distress, bright awake and alert. There is no shortness of breath.   PSYCHIAtRIC: Mood/affect normal, judgement normal, appropriate  HEENT: Normocephalic, atraumatic.   Chest: Breathing unlabored  Cardiac: Regular rate and rhythm.   Extremities:  No clubbing, cyanosis or edema.    NEUROLOGICAL:    Cognition:   Fully oriented.  Fund of knowledge excellent.  Concentration and attention normal.   Language normal with normal comprehension, fluent speech, intact repetition and naming.   Short and long term memory appears intact    Cranial nerves;    II - pupils bilaterally equal reacting to light,  No new Visual field deficits;  Fundoscopic exam- Not able to be done, non-dilated exam  III,IV,VI: EOMI with no diplopia  V: Normal facial sensations  VII: No facial asymmetry,  VIII: No New hearing abnormality  IX, X, XI: normal gag and shoulder shrug;  XII: tongue is in the midline.    Sensory:  Intact to light touch in all extremities.     Motor: Strength 5/5 bilaterally upper and lower extremities. No involuntary movements present. Normal tone and bulk.     Cerebellar: Finger to nose and mirror movements normal bilaterally.  Heel-to-shin normal.   Gait and balance: Deferred.     Physical exam performed by MICKIE Servin.  ________________________________________________________   RESULTS REVIEW:    VITAL SIGNS:   Temp:  [97.7 °F (36.5 °C)-98.3 °F (36.8 °C)] 98.3 °F (36.8 °C)  Heart Rate:  [45-71] 57  Resp:  [13-20] 13  BP: ()/(55-83) 133/70      LABS:      Lab 09/19/23  1758   WBC 7.30   HEMOGLOBIN 14.9   HEMATOCRIT 44.1   PLATELETS 200   NEUTROS ABS 5.40   LYMPHS ABS 1.20   MONOS ABS 0.50   EOS ABS 0.10   MCV 91.2   PROTIME 36.6*         Lab 09/19/23 2007 09/19/23  1833   SODIUM 142  --    POTASSIUM 4.3  --    CHLORIDE 103  --    CO2 29.0  --    ANION GAP 10.0  --    BUN 12  --    CREATININE 0.87  --    EGFR 90.0  --    GLUCOSE 110*  --    CALCIUM 9.6  --    TSH  --  1.240         Lab 09/19/23 2007 09/19/23 1833   TOTAL PROTEIN 7.1  --    ALBUMIN 4.5  --    GLOBULIN 2.6  --    ALT (SGPT) 25  --    AST (SGOT) 24  --    BILIRUBIN 1.0  --    ALK PHOS 78  --    LIPASE  --  21         Lab 09/20/23  0204 09/19/23  1758   HSTROP T 15*  --    PROTIME  --  36.6*   INR  --  3.68*                 UA          9/19/2023    20:11   Urinalysis   Specific Wyatt, UA 1.018    Ketones, UA Trace    Blood, UA Negative    Leukocytes, UA Negative    Nitrite, UA Negative        Lab Results   Component Value Date    TSH 1.240 09/19/2023     (H) 10/29/2020    AKDLNJFR24 <150 (L) 08/17/2020       IMAGING STUDIES:  CT Head Without Contrast    Result Date: 9/19/2023  Impression: 1.No acute intracranial abnormality. 2.Mild chronic small vessel ischemic change. Electronically Signed: Albert Adan MD  9/19/2023 11:57 PM EDT  Workstation ID: [id number]    CT Angiogram Neck    Result Date: 9/20/2023  Impression: 1.No significant vascular abnormality in the head or the neck. No significant stenosis, aneurysm or dissection. 2.Severe cervical spondylosis. Electronically Signed: Albert Adan MD  9/20/2023 12:02 AM EDT  Workstation ID: [id number]    MRI Brain Without Contrast    Result Date: 9/20/2023  Impression: No acute intracranial abnormality, specifically negative for acute ischemia. Electronically Signed: Simon Laird MD  9/20/2023 8:54 AM EDT  Workstation ID: [id number]    CT Angiogram Head    Result Date: 9/20/2023  Impression: 1.No significant vascular abnormality in the  head or the neck. No significant stenosis, aneurysm or dissection. 2.Severe cervical spondylosis. Electronically Signed: Albert Adan MD  9/20/2023 12:02 AM EDT  Workstation ID: [id number]     I reviewed the patient's new clinical results.    ________________________________________________________     PROBLEM LIST:    Dizziness            ASSESSMENT/PLAN:  Dizziness, difficult with balance. Likely BPPV and/or vestibular hypofunction. There is no posterior circulation stroke or VBI on CTA.   - CT head Normal   - MRI brain personally reviewed, no acute or subacute stroke   - CTA head and neck: normal- no VBI   - EKG: Sinus bradycardia rate 49   - PT note shows + vestibular signs and VOR testing. Recommending outpatient   - Recommend medrol 4 mg dose pack at discharge-   - Nothing else to add from inpatient Neurology     There are no further recommendations. Will sign off, please call with any questions or concerns.  I discussed the patient's findings and my recommendations with patient    Beulah Botello, APRN  09/20/23  11:10 EDT

## 2023-09-20 NOTE — DISCHARGE PLACEMENT REQUEST
"Filipe Horvath \"Arslan\" (73 y.o. Male)       Date of Birth   1950    Social Security Number       Address   [address] [place] [address]    Home Phone   [phone]    MRN   [id number]       [facility]    Marital Status                               Admission Date   9/19/23    Admission Type   Emergency    Admitting Provider   Lopez Harrison DO    Attending Provider   Evgeny Sampson Jr., MD    Department, Room/Bed   [facility] EMERGENCY DEPARTMENT, 25/25       Discharge Date       Discharge Disposition       Discharge Destination                                 Attending Provider: Evgeny Sampson Jr., MD    Allergies: Codeine    Isolation: None   Infection: None   Code Status: CPR    Ht: 188 cm (74\")   Wt: 115 kg (252 lb 10.4 oz)    Admission Cmt: None   Principal Problem: Dizziness [R42]                   Active Insurance as of 9/19/2023       Primary Coverage       Payor Plan Insurance Group Employer/Plan Group    MEDICARE MEDICARE A & B        Payor Plan Address Payor Plan Phone Number Payor Plan Fax Number Effective Dates    [address] [phone]  1/1/2013 - None Entered    [facility] [address]         Subscriber Name Subscriber Birth Date Member ID       FILIPE HORVATH 1950 [id number]               Secondary Coverage       Payor Plan Insurance Group Employer/Plan Group    [employer] [place] [employer] [place]        Payor Plan Address Payor Plan Phone Number Payor Plan Fax Number Effective Dates    [address] [phone]  1/1/2020 - None Entered    [facility] [address]         Subscriber Name Subscriber Birth Date Member ID       FILIPE HORVATH 1950 [id number]                     Emergency Contacts        (Rel.) Home Phone Work Phone Mobile Phone    Amanda Horvath (Spouse) [phone] -- [phone]          [facility] EMERGENCY DEPARTMENT  [address] [facility] [place] [address]  Phone:  [phone]  Fax:   " Date: Sep 20, 2023      Ambulatory Referral to Physical Therapy Vestibular     Patient:  Filipe Altamirano MRN:  [id number]   [address]  [place] [address] :  1950  SSN:    Phone: [phone] Sex:  M      INSURANCE PAYOR PLAN GROUP # SUBSCRIBER ID   Primary:  Secondary:    MEDICARE [employer] [place] [id number]  [id number]      [id number]  [id number]      Referring Provider Information:  NENA ARREOLA JR Phone: [phone] Fax: [phone]       Referral Information:   # Visits:  1 Referral Type: Physical Therapy [AE1]   Urgency:  Routine Referral Reason: Specialty Services Required   Start Date: Sep 20, 2023 End Date:  To be determined by Insurer   Diagnosis: Dizziness (R42 [ICD-10-CM] 780.4 [ICD-9-CM])      Refer to Dept:   Refer to Provider:   Refer to Provider Phone:   Refer to Facility:       Specialty needed: Vestibular  Follow-up needed: Yes     This document serves as a request of services and does not constitute Insurance authorization or approval of services.  To determine eligibility, please contact the members Insurance carrier to verify and review coverage.     If you have medical questions regarding this request for services. Please contact [facility] EMERGENCY DEPARTMENT at [phone] during normal business hours.        Authorizing Provider:Nena Arreola Jr., MD  Authorizing Provider's NPI: [id number]  Order Entered By: Nena Arreola Jr., MD 2023 10:36 AM     Electronically signed by: Nena Arreola Jr., MD 2023 10:36 AM

## 2023-09-20 NOTE — ED PROVIDER NOTES
Subjective   History of Present Illness  73-year-old male with history of A-fib on Coumadin as well as vertigo years ago he was told he has BPPV presents for dizziness.  Woke up with symptoms early in the morning.  States his balance is off and he feels very unsteady when walking.  Denies headache.  Denies any extremity weakness.  Especially worse whenever he tries to look down.      Review of Systems  Positive for dizziness and feeling off balance.  Past Medical History:   Diagnosis Date    Atrial fibrillation     Disease of thyroid gland     Hard of hearing     Hyperlipidemia     Hypertension     PONV (postoperative nausea and vomiting)     Vertigo        Allergies   Allergen Reactions    Codeine Paresthesia     Other reaction(s): Unknown       Past Surgical History:   Procedure Laterality Date    ABDOMINAL SURGERY      APPENDECTOMY      CATARACT EXTRACTION Right     COLON SURGERY      HERNIA REPAIR         Family History   Problem Relation Age of Onset    No Known Problems Mother     No Known Problems Father     Arrhythmia Brother        Social History     Socioeconomic History    Marital status:    Tobacco Use    Smoking status: Former     Packs/day: 0.00     Years: 10.00     Pack years: 0.00     Types: Cigarettes     Start date: 6/15/1969     Quit date: 6/15/1979     Years since quittin.2     Passive exposure: Never    Smokeless tobacco: Never    Tobacco comments:     While in the [employer]   Vaping Use    Vaping Use: Never used   Substance and Sexual Activity    Alcohol use: Yes     Alcohol/week: 1.0 standard drink     Types: 1 Glasses of wine per week     Comment: occasionally    Drug use: No    Sexual activity: Yes     Partners: Female           Objective   Physical Exam  Constitutional:  No acute distress.  Head:  Atraumatic.  Normocephalic.   Eyes:  No scleral icterus. Normal conjunctivae  ENT:  Moist mucosa.  No nasal discharge present.  Cardiovascular:  Well perfused.  Equal pulses.  Regular rate.  " Normal capillary refill.    Pulmonary/Chest:  No respiratory distress.  Airway patent.  No tachypnea.  No accessory muscle usage.    Abdominal:  Nondistended. Nontender.   Extremities:  No peripheral edema.  No Deformity  Skin:  Warm, dry  Neurological:  Alert and oriented to person place time and situation. PERRL.  EOMI.  Cranial nerves II-XII are intact.  Strength is equal and 5/5 in the upper and lower extremities bilaterally.  No sensory deficits to light touch.  No pronator drift.  Normal speech.  Bilateral dysmetria on finger-to-nose.      Procedures           ED Course      /73   Pulse (!) 45   Temp 97.7 °F (36.5 °C) (Oral)   Resp 20   Ht 188 cm (74\")   Wt 110 kg (242 lb 4.6 oz)   SpO2 94%   BMI 31.11 kg/m²   Labs Reviewed   COMPREHENSIVE METABOLIC PANEL - Abnormal; Notable for the following components:       Result Value    Glucose 110 (*)     All other components within normal limits    Narrative:     GFR Normal >60  Chronic Kidney Disease <60  Kidney Failure <15    The GFR formula is only valid for adults with stable renal function between ages 18 and 70.   URINALYSIS W/ MICROSCOPIC IF INDICATED (NO CULTURE) - Abnormal; Notable for the following components:    Ketones, UA Trace (*)     All other components within normal limits    Narrative:     Urine microscopic not indicated.   PROTIME-INR - Abnormal; Notable for the following components:    Protime 36.6 (*)     INR 3.68 (*)     All other components within normal limits   CBC WITH AUTO DIFFERENTIAL - Abnormal; Notable for the following components:    Lymphocyte % 16.3 (*)     All other components within normal limits   RESPIRATORY PANEL PCR W/ COVID-19 (SARS-COV-2) JACLYN/NELI/VALERIY/PAD/COR/MAD/NELL IN-HOUSE, NP SWAB IN [facility], 3-4 HR TAT - Normal    Narrative:     In the setting of a positive respiratory panel with a viral infection PLUS a negative procalcitonin without other underlying concern for bacterial infection, consider observing off " antibiotics or discontinuation of antibiotics and continue supportive care. If the respiratory panel is positive for atypical bacterial infection (Bordetella pertussis, Chlamydophila pneumoniae, or Mycoplasma pneumoniae), consider antibiotic de-escalation to target atypical bacterial infection.   LIPASE - Normal   TSH - Normal   T4, FREE - Normal    Narrative:     Results may be falsely increased if patient taking Biotin.     SINGLE HSTROPONIN T   CBC AND DIFFERENTIAL    Narrative:     The following orders were created for panel order CBC & Differential.  Procedure                               Abnormality         Status                     ---------                               -----------         ------                     CBC Auto Differential[[id number]]        Abnormal            Final result                 Please view results for these tests on the individual orders.   EXTRA TUBES    Narrative:     The following orders were created for panel order Extra Tubes.  Procedure                               Abnormality         Status                     ---------                               -----------         ------                     Gold Top - SST[[id number]]                                   Final result                 Please view results for these tests on the individual orders.   GOLD TOP - SST     Medications   sodium chloride 0.9 % flush 10 mL (has no administration in time range)   meclizine (ANTIVERT) tablet 25 mg (25 mg Oral Not Given 9/19/23 2116)   diphenhydrAMINE (BENADRYL) injection 12.5 mg (12.5 mg Intravenous Given 9/19/23 2119)   iopamidol (ISOVUE-370) 76 % injection 100 mL (100 mL Intravenous Given 9/19/23 2343)     CT Head Without Contrast    Result Date: 9/19/2023  Impression: 1.No acute intracranial abnormality. 2.Mild chronic small vessel ischemic change. Electronically Signed: Albert Adan MD  9/19/2023 11:57 PM EDT  Workstation ID: [id number]    CT Angiogram Neck    Result Date:  9/20/2023  Impression: 1.No significant vascular abnormality in the head or the neck. No significant stenosis, aneurysm or dissection. 2.Severe cervical spondylosis. Electronically Signed: Albert Adan MD  9/20/2023 12:02 AM EDT  Workstation ID: [id number]    CT Angiogram Head    Result Date: 9/20/2023  Impression: 1.No significant vascular abnormality in the head or the neck. No significant stenosis, aneurysm or dissection. 2.Severe cervical spondylosis. Electronically Signed: Albert Adan MD  9/20/2023 12:02 AM EDT  Workstation ID: [id number]                                        Medical Decision Making  Amount and/or Complexity of Data Reviewed  Labs: ordered.  Radiology: ordered.  ECG/medicine tests: ordered.    Risk  Prescription drug management.    EKG # 1  Signed and interpreted by the EP.  Time Interpreted: 1802  Rate: 49  Rhythm: Sinus bradycardia  Axis: Within normal limits  Intervals: Prolonged QRS and first-degree AV block  ST Segments: Nonspecific changes.     Patient remained dizzy after Benadryl.  Bilateral dysmetria.  Suspect peripheral cause.  Given supratherapeutic INR and persistent symptoms will admit for further work-up and evaluation.  Excepted by Symone on behalf of of hospitalist service.      Final diagnoses:   Dizziness       ED Disposition  ED Disposition       ED Disposition   Decision to Admit    Condition   --    Comment   Level of Care: Med/Surg [1]   Admitting Physician: PAYTON BLAKE [[id number]]   Attending Physician: PAYTON BLAKE [[id number]]                 No follow-up provider specified.       Medication List        Changed      metoprolol tartrate 25 MG tablet  Commonly known as: LOPRESSOR  TAKE ONE TABLET BY MOUTH THREE TIMES A DAY  What changed: when to take this                 Maco Espinosa MD  09/20/23 0131

## 2023-09-20 NOTE — CASE MANAGEMENT/SOCIAL WORK
Discharge Planning Assessment  [facility]     Patient Name: Filipe Altamirano  MRN: [id number]  Today's Date: 9/20/2023    Admit Date: 9/19/2023    Plan: Home with family & outpatient PT for vestibular rehab (Order needed). Lela Baker on [facility]   Discharge Needs Assessment       Row Name 09/20/23 0956       Living Environment    People in Home spouse    Name(s) of People in Home Amanda    Current Living Arrangements home    Potentially Unsafe Housing Conditions none    Primary Care Provided by self    Provides Primary Care For no one    Family Caregiver if Needed spouse    Family Caregiver Names Amanda    Quality of Family Relationships supportive    Able to Return to Prior Arrangements yes       Resource/Environmental Concerns    Resource/Environmental Concerns none    Transportation Concerns none       Transition Planning    Patient/Family Anticipates Transition to home with family    Patient/Family Anticipated Services at Transition outpatient care       Discharge Needs Assessment    Equipment Currently Used at Home none    Concerns to be Addressed denies needs/concerns at this time    Anticipated Changes Related to Illness none    Equipment Needed After Discharge none    Outpatient/Agency/Support Group Needs outpatient therapy    Discharge Facility/Level of Care Needs outpatient therapy    Provided Post Acute Provider List? Yes    Post Acute Provider List Outpatient Therapy    Delivered To Patient    Method of Delivery In person    Patient's Choice of Community Agency(s) [facility]. Pt requested [facility], but they don't provide vestibular rehab. Chose [facility]. Secure chat sent to Dr. Sampson requesting order for outpatient PT.Response pending                   Discharge Plan       Row Name 09/20/23 1021       Plan    Plan Home with family & outpatient PT for vestibular rehab (Order needed). Lela Kelloggtisdionicio Baker on [facility]    Plan Comments Pt reports he  "lives with spouse and is IADLs. 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 He confirms pcp and pharmacy and denies difficulty obtaining medications or transportation. Reports wife will provide transportation at discharge. PT is recommending outpatient vestibular rehab. Pt reports he has been to outpatient rehab at [facility], and would like to go there again. Per PT Olvin, they don't provide vestibular rehab. Pt Chose [facility]  location. Secure chat sent to Dr. Sampson requesting order for outpatient PT.Response pending. Face sheet sent to [facility] with notification of need for vestibular rehab and need order. DC Barriers: Neuro consult                  Continued Care and Services - Admitted Since 9/19/2023       Therapy       Service Provider Request Status Selected Services Address Phone Fax Patient Preferred    [facility] PHYS[facility]ER[facility]Y Pending - Request Sent N/A [address] [place] [address] [phone] --                     Demographic Summary       Row Name 09/20/23 0955       General Information    Admission Type observation    Arrived From home    Required Notices Provided Observation Status Notice    Referral Source admission list;hospital clinician/department    Reason for Consult discharge planning    Preferred Language English       Contact Information    Permission Granted to Share Info With ;facility                    Functional Status       Row Name 09/20/23 0955       Functional Status    Usual Activity Tolerance good    Current Activity Tolerance moderate       Functional Status, IADL    Medications independent    Meal Preparation independent    Housekeeping independent    Laundry independent    Shopping independent                   Patient Forms       Row Name 09/20/23 0814       Patient Forms    Important Message from Medicare (IMM) Delivered  GOETZ " 9/20/23 per case management.    Delivered to Patient    Method of delivery In person                9/20/23 1035: Dr. Sampson acknowledged secure message  Janae Ayala RN, [facility]  Office: [phone]  Fax: [phone]  [email or web address]      I met with patient in room wearing PPE: mask and glasses     Maintained distance greater than six feet and spent </=15 minutes in the room    Janae Ayala RN

## 2023-09-20 NOTE — H&P
[facility] Medicine Services  History & Physical    Patient Name: Filipe Altamirano  : 1950  MRN: [id number]  Primary Care Physician:  Frantz Saleem MD  Date of admission: 2023  Date and Time of Service: 23 at 0240    Subjective      Chief Complaint: Dizziness/ loss of balance    History of Present Illness: Filipe Altamirano is a 73 y.o. male with past medical history of A-fib on warfarin, hypertension, hypothyroidism and a remote history of vertigo who presented to [facility] on 2023 complaining of dizziness    Patient reports was in normal state of health and went to bed and woke up around 3 AM to go to the restroom and noticed had difficulty with balance.  He reports symptoms were different than previous diagnosis of vertigo approximately 3 years ago.  He reports initially he was not nauseated but did have vomiting x1 on way to the hospital.  He reports unable to look up down left or right without eliciting dizziness.  He reports unsteady gait and difficulty with ambulation as well    In the ED CT head and neck negative for any acute intercranial abnormality.  Initial troponin is 15.  Electrolytes within normal limits.  No leukocytosis.  Urinalysis within normal limits.  Vital signs positive for bradycardia with drop into the 40s which patient reports is normal.  will have neurology consult.  MRI is pending to rule out CVA.  Will get PT eval.  Admitted for further observation      Review of Systems   Gastrointestinal:  Positive for heartburn and nausea.   Neurological:  Positive for dizziness.   All other systems reviewed and are negative.     Personal History     Past Medical History:   Diagnosis Date    Atrial fibrillation     Disease of thyroid gland     Hard of hearing     Hyperlipidemia     Hypertension     PONV (postoperative nausea and vomiting)     Vertigo        Past Surgical History:   Procedure Laterality Date    ABDOMINAL SURGERY      APPENDECTOMY       CATARACT EXTRACTION Right     COLON SURGERY      HERNIA REPAIR         Family History: family history includes Arrhythmia in his brother; No Known Problems in his father and mother. Otherwise pertinent FHx was reviewed and not pertinent to current issue.    Social History:  reports that he quit smoking about 44 years ago. His smoking use included cigarettes. He started smoking about 54 years ago. He has never been exposed to tobacco smoke. He has never used smokeless tobacco. He reports current alcohol use of about 1.0 standard drink per week. He reports that he does not use drugs.    Home Medications:  Prior to Admission Medications       Prescriptions Last Dose Informant Patient Reported? Taking?    Cholecalciferol (Vitamin D3) 50 MCG (2000 UT) capsule   Yes No    Vitamin D3 50 mcg (2,000 unit) capsule   Take 3 capsules by mouth once daily X 8 weeks. Then take 1 capsule once daily by mouth X 4 weeks.    levothyroxine (SYNTHROID, LEVOTHROID) 100 MCG tablet   Yes No    Take 1 tablet by mouth Daily.    losartan (COZAAR) 25 MG tablet   No No    Take 1 tablet by mouth Daily.    metoprolol tartrate (LOPRESSOR) 25 MG tablet   No No    TAKE ONE TABLET BY MOUTH THREE TIMES A DAY    Patient taking differently:  Take 1 tablet by mouth 2 (Two) Times a Day.    rosuvastatin (CRESTOR) 5 MG tablet   Yes No    Take 1 tablet by mouth Daily.    warfarin (COUMADIN) 5 MG tablet   No No    TAKE 1 AND 1/2 TABLET BY MOUTH DAILY AS DIRECTED              Allergies:  Allergies   Allergen Reactions    Codeine Paresthesia     Other reaction(s): Unknown       Objective      Vitals:   Temp:  [97.7 °F (36.5 °C)] 97.7 °F (36.5 °C)  Heart Rate:  [45-71] 53  Resp:  [20] 20  BP: ()/(55-77) 126/66    Physical Exam  Constitutional:       Appearance: He is obese.      Comments: Appears uncomfortable. No distress   Eyes:      Pupils: Pupils are equal, round, and reactive to light.   Cardiovascular:      Rate and Rhythm: Normal rate and regular  rhythm.   Pulmonary:      Effort: Pulmonary effort is normal.      Breath sounds: Normal breath sounds.   Abdominal:      Palpations: Abdomen is soft.   Musculoskeletal:         General: Normal range of motion.   Skin:     General: Skin is warm and dry.   Neurological:      Mental Status: He is alert and oriented to person, place, and time.   Psychiatric:         Behavior: Behavior normal.        Result Review    Result Review:  I have personally reviewed the results from the time of this admission to 9/20/2023 02:46 EDT and agree with these findings:  [x]  Laboratory  []  Microbiology  [x]  Radiology  [x]  EKG/Telemetry   []  Cardiology/Vascular   []  Pathology  []  Old records  []  Other:  Most notable findings include:   CT Head Without Contrast    Result Date: 9/19/2023  Impression: 1.No acute intracranial abnormality. 2.Mild chronic small vessel ischemic change. Electronically Signed: Albert Adan MD  9/19/2023 11:57 PM EDT  Workstation ID: [id number]    CT Angiogram Neck    Result Date: 9/20/2023  Impression: 1.No significant vascular abnormality in the head or the neck. No significant stenosis, aneurysm or dissection. 2.Severe cervical spondylosis. Electronically Signed: Albert Adan MD  9/20/2023 12:02 AM EDT  Workstation ID: [id number]    CT Angiogram Head    Result Date: 9/20/2023  Impression: 1.No significant vascular abnormality in the head or the neck. No significant stenosis, aneurysm or dissection. 2.Severe cervical spondylosis. 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 Follow up MRI pending  - neurology to see  - tele monitor    Paroxysmal A-fib  - INR 3.68  - will hold warfarin and pharmacy to dose   - continue metoprolol but hold for HR < 60   - pt reports previously ordered as TID but reduced to BID by PCP     HLD  -resume statin    Hypothyroid  - resume synthroid    DVT prophylaxis:  Mechanical DVT prophylaxis orders are present.    CODE STATUS:    Code Status (Patient has no pulse and is not breathing): CPR (Attempt to Resuscitate)  Medical Interventions (Patient has pulse or is breathing): Full Support    Admission Status:  I believe this patient meets observation status.    I discussed the patient's findings and my recommendations with patient.    This patient has been examined wearing appropriate Personal Protective Equipment   Signature: Electronically signed by MALENA Corona, 09/20/23, 02:46 EDT.  [facility], [place], operated by [facility] Hospitalist Team

## 2023-09-20 NOTE — DISCHARGE PLACEMENT REQUEST
"Filipe Horvath \"Arslan\" (73 y.o. Male)       Date of Birth   1950    Social Security Number       Address   [address] [place], [place] [address] [address]    Home Phone   [phone]    MRN   [id number]       [employer]    Marital Status                               Admission Date   9/19/23    Admission Type   Emergency    Admitting Provider   Lopez Harrison DO    Attending Provider   Evgeny Sampson Jr., MD    Department, Room/Bed   [facility] EMERGENCY DEPARTMENT, 25/25       Discharge Date       Discharge Disposition       Discharge Destination                                 Attending Provider: Evgeny Sampson Jr., MD    Allergies: Codeine    Isolation: None   Infection: COVID (rule out) (09/19/23)   Code Status: CPR    Ht: 188 cm (74\")   Wt: 115 kg (252 lb 10.4 oz)    Admission Cmt: None   Principal Problem: Dizziness [R42]                   Active Insurance as of 9/19/2023       Primary Coverage       Payor Plan Insurance Group Employer/Plan Group    MEDICARE MEDICARE A & B        Payor Plan Address Payor Plan Phone Number Payor Plan Fax Number Effective Dates    [address] [phone]  1/1/2013 - None Entered    [facility] [id number]         Subscriber Name Subscriber Birth Date Member ID       FILIPE HORVATH 1950 [id number]               Secondary Coverage       Payor Plan Insurance Group Employer/Plan Group    [employer] [place] [employer] [place]        Payor Plan Address Payor Plan Phone Number Payor Plan Fax Number Effective Dates    [address] [employer] [place] [phone]  1/1/2020 - None Entered    [place] [address]         Subscriber Name Subscriber Birth Date Member ID       FILIPE HORVATH 1950 [id number]                     Emergency Contacts        (Rel.) Home Phone Work Phone Mobile Phone    Amanda Horvath (Spouse) [phone] -- [phone]              Janae Ayala RN, [facility]  Office: [phone]  Fax: " [phone]  [email or web address]      I met with patient in room wearing PPE: mask and glasses     Maintained distance greater than six feet and spent </=15 minutes in the room

## 2023-09-20 NOTE — DISCHARGE SUMMARY
[facility] HOSPITALIST DISCHARGE SUMMARY        Patient ID:  Filipe Altamirano  [id number]  73 y.o.  1950    Admit date: 9/19/2023    Discharge date and time: 09/20/23    Admitting Physician: Lopez Harrison DO    Discharge Physician: Evgeny Sampson    Admission Diagnoses: Dizziness [R42]    Discharge Diagnoses: BPPV    Admission Condition: fair    Discharged Condition: fair    Indication for Admission: neuro eval, monitoring    Hospital Course:  Filipe Altamirano is a 73 y.o. male with past medical history of A-fib on warfarin, hypertension, hypothyroidism and a remote history of vertigo who presented to [facility] on 9/19/2023 complaining of dizziness     Patient reports was in normal state of health and went to bed and woke up around 3 AM to go to the restroom and noticed had difficulty with balance.  He reports symptoms were different than previous diagnosis of vertigo approximately 3 years ago.  He reports initially he was not nauseated but did have vomiting x1 on way to the hospital.  He reports unable to look up down left or right without eliciting dizziness.  He reports unsteady gait and difficulty with ambulation as well     In the ED CT head and neck negative for any acute intercranial abnormality.  Initial troponin is 15.  Electrolytes within normal limits.  No leukocytosis.  Urinalysis within normal limits.  Vital signs positive for bradycardia with drop into the 40s which patient reports is normal.  will have neurology consult.  MRI is pending to rule out CVA.  Will get PT eval.  Admitted for further observation    Patient underwent MRI that was negative for acute pathology.  Evaluated by PT who recommends outpatient vestibular therapy.  Also seen by neurology who recommends outpatient follow-up along with Medrol Dosepak.  Discussed at length with patient and he is in agreement with this plan.    Consults: neurology    Significant Diagnostic Studies:  as above    Treatments:  as  "above    Discharge Exam:  /68 (BP Location: Right arm, Patient Position: Lying)   Pulse 66   Temp 98.3 °F (36.8 °C) (Oral)   Resp 13   Ht 188 cm (74\")   Wt 115 kg (252 lb 10.4 oz)   SpO2 94%   BMI 32.44 kg/m²   General appearance: alert, appears stated age, and cooperative  Lungs: clear to auscultation bilaterally  Heart: regular rate and rhythm, S1, S2 normal, no murmur, click, rub or gallop  Abdomen: soft, non-tender; bowel sounds normal; no masses,  no organomegaly  Neurologic: Grossly normal    Disposition: Home or Self Care    Patient Instructions:       Discharge Medications        New Medications        Instructions Start Date   methylPREDNISolone 4 MG dose pack  Commonly known as: MEDROL   Take as directed on package instructions.             Continue These Medications        Instructions Start Date   levothyroxine 100 MCG tablet  Commonly known as: SYNTHROID, LEVOTHROID   1 tablet, Oral, Daily      losartan 25 MG tablet  Commonly known as: COZAAR   25 mg, Oral, Daily      metoprolol tartrate 25 MG tablet  Commonly known as: LOPRESSOR   25 mg, Oral, 2 Times Daily      rosuvastatin 5 MG tablet  Commonly known as: CRESTOR   5 mg, Oral, Daily      Vitamin D3 50 MCG (2000 UT) capsule   Take 1 capsule by mouth Daily.      warfarin 5 MG tablet  Commonly known as: COUMADIN   7.5 mg, Oral, Daily Warfarin             Activity: activity as tolerated  Diet: cardiac diet  Wound Care: none needed    Follow-up with PCP in 5 days.    Discharge process took 32 minutes.    Pending Labs at Discharge:       The ASCVD Risk score (Subha VENEGAS, et al., 2019) failed to calculate for the following reasons:    The patient has a prior MI or stroke diagnosis     Signed:  Evgeny Sampson Jr, MD  9/20/2023  13:27 EDT     "

## 2023-09-20 NOTE — THERAPY EVALUATION
Patient Name: Filipe Altamirano  : 1950    MRN: [id number]                              Today's Date: 2023       Admit Date: 2023    Visit Dx:     ICD-10-CM ICD-9-CM   1. Dizziness  R42 780.4     Patient Active Problem List   Diagnosis    Essential hypertension    Hypothyroidism (acquired)    Atrial fibrillation    Long term (current) use of anticoagulants    Hyperlipidemia    Atrial flutter with rapid ventricular response    Dizziness     Past Medical History:   Diagnosis Date    Atrial fibrillation     Disease of thyroid gland     Hard of hearing     Hyperlipidemia     Hypertension     PONV (postoperative nausea and vomiting)     Vertigo      Past Surgical History:   Procedure Laterality Date    ABDOMINAL SURGERY      APPENDECTOMY      CATARACT EXTRACTION Right     COLON SURGERY      HERNIA REPAIR        General Information       Row Name 23 1107          Physical Therapy Time and Intention    Document Type evaluation  -AD     Mode of Treatment physical therapy  -AD       Row Name 23 1107          General Information    Patient Profile Reviewed yes  -AD     Prior Level of Function independent:  -AD     Existing Precautions/Restrictions no known precautions/restrictions  -AD     Barriers to Rehab --  vestibular impairment  -AD       Row Name 23 1107          Living Environment    People in Home spouse  -AD       Row Name 23 1107          Cognition    Orientation Status (Cognition) oriented x 4  -AD       Row Name 23 1107          Safety Issues, Functional Mobility    Impairments Affecting Function (Mobility) balance  -AD               User Key  (r) = Recorded By, (t) = Taken By, (c) = Cosigned By      Initials Name Provider Type    AD Ariana Crump PT Physical Therapist                   Mobility       Row Name 23 1108          Bed Mobility    Bed Mobility bed mobility (all) activities  -AD     All Activities, [place] (Bed Mobility) independent  -AD        Row Name 09/20/23 1108          Sit-Stand Transfer    Sit-Stand [place] (Transfers) contact guard  -AD       Row Name 09/20/23 1108          Gait/Stairs (Locomotion)    [place] Level (Gait) contact guard  -AD     Patient was able to Ambulate yes  -AD     Distance in Feet (Gait) 10  -AD     Deviations/Abnormal Patterns (Gait) tess decreased;base of support, wide  -AD               User Key  (r) = Recorded By, (t) = Taken By, (c) = Cosigned By      Initials Name Provider Type    Ariana Latif PT Physical Therapist                   Obj/Interventions       Row Name 09/20/23 1109          Range of Motion Comprehensive    General Range of Motion no range of motion deficits identified  -AD       Row Name 09/20/23 1109          Strength Comprehensive (MMT)    General Manual Muscle Testing (MMT) Assessment no strength deficits identified  -AD       Row Name 09/20/23 1109          Balance    Balance Assessment sitting static balance  -AD     Static Sitting Balance independent  -AD     Position, Sitting Balance sitting edge of bed  -AD               User Key  (r) = Recorded By, (t) = Taken By, (c) = Cosigned By      Initials Name Provider Type    Ariana Latif, PT Physical Therapist                   Goals/Plan       Row Name 09/20/23 1115          Bed Mobility Goal 1 (PT)    Activity/Assistive Device (Bed Mobility Goal 1, PT) bed mobility activities, all  -AD     [place] Level/Cues Needed (Bed Mobility Goal 1, PT) independent  -AD     Time Frame (Bed Mobility Goal 1, PT) long term goal (LTG)  -AD       Row Name 09/20/23 1115          Transfer Goal 1 (PT)    Activity/Assistive Device (Transfer Goal 1, PT) transfers, all  -AD     [place] Level/Cues Needed (Transfer Goal 1, PT) independent  -AD     Time Frame (Transfer Goal 1, PT) long term goal (LTG)  -AD       Row Name 09/20/23 1115          Gait Training Goal 1 (PT)    Activity/Assistive Device (Gait Training Goal 1, PT) gait (walking  locomotion)  -AD     [place] Level (Gait Training Goal 1, PT) standby assist  -AD     Distance (Gait Training Goal 1, PT) 150  -AD     Time Frame (Gait Training Goal 1, PT) long term goal (LTG)  -AD               User Key  (r) = Recorded By, (t) = Taken By, (c) = Cosigned By      Initials Name Provider Type    AD Ariana Crump, PT Physical Therapist                   Clinical Impression       Row Name 09/20/23 1109          Pain    Pretreatment Pain Rating 0/10 - no pain  -AD     Posttreatment Pain Rating 0/10 - no pain  -AD       Row Name 09/20/23 1109          Plan of Care Review    Plan of Care Reviewed With patient  -AD     Progress no change  -AD     Outcome Evaluation Pt is a 73 yr/o male with PMHx of a-fib, HTN, and vertigo presenting to ED with dizziness. CT and MRI negative for acute abnormalities. He shows positive signs for vestibular involvement and vertiginous causes of dizziness with positive VOR testing vertically, as well as positive sidelying hallpike text and spinning sensation with positional changes. Saccades are normal and head thrust is normal. Performed canalith repositioning maneuvers per AIB protocol which revealed left posterior canalithiasis BPPV. This did resolve during the session, however he continued to c/o slight off balance feeling afterwards. Education given on process of canalith repositioning techniques and how this resolution can take 12-24 hours to take effect fully, pt wtih good understanding. Recommend outpatient vestibular PT to follow up on BPPV and possible vestibular hypofunction. Pt given options for PT in this setting and shows good motivation and good understanding. Nursing notified.  -AD       Row Name 09/20/23 1101          Therapy Assessment/Plan (PT)    Patient/Family Therapy Goals Statement (PT) resolve dizziness, go home  -AD     Rehab Potential (PT) good, to achieve stated therapy goals  -AD     Criteria for Skilled Interventions Met (PT) yes;skilled  treatment is necessary  -AD     Therapy Frequency (PT) 5 times/wk  -AD       Row Name 09/20/23 1109          Vital Signs    Pre Patient Position Supine  -AD     Post Patient Position Supine  -AD               User Key  (r) = Recorded By, (t) = Taken By, (c) = Cosigned By      Initials Name Provider Type    Ariana Latif, EVELIA Physical Therapist                   Outcome Measures       Row Name 09/20/23 1116 09/20/23 0750       How much help from another person do you currently need...    Turning from your back to your side while in flat bed without using bedrails? 4  -AD 4  -KN    Moving from lying on back to sitting on the side of a flat bed without bedrails? 3  -AD 4  -KN    Moving to and from a bed to a chair (including a wheelchair)? 3  -AD 3  -KN    Standing up from a chair using your arms (e.g., wheelchair, bedside chair)? 3  -AD 3  -KN    Climbing 3-5 steps with a railing? 3  -AD 3  -KN    To walk in hospital room? 3  -AD 3  -KN    AM-PAC 6 Clicks Score (PT) 19  -AD 20  -KN    Highest level of mobility 6 --> Walked 10 steps or more  -AD 6 --> Walked 10 steps or more  -KN      Row Name 09/20/23 1116          Functional Assessment    Outcome Measure Options AM-PAC 6 Clicks Basic Mobility (PT)  -AD               User Key  (r) = Recorded By, (t) = Taken By, (c) = Cosigned By      Initials Name Provider Type    Ariana Latif PT Physical Therapist    Hattie Marcelino, RN Registered Nurse                                 Physical Therapy Education       Title: PT OT SLP Therapies (In Progress)       Topic: Physical Therapy (In Progress)       Point: Mobility training (Done)       Learning Progress Summary             Patient Acceptance, E, VU by AD at 9/20/2023 1117                         Point: Home exercise program (Not Started)       Learner Progress:  Not documented in this visit.              Point: Body mechanics (Not Started)       Learner Progress:  Not documented in this visit.               Point: Precautions (Not Started)       Learner Progress:  Not documented in this visit.                              User Key       Initials Effective Dates Name Provider Type Discipline    AD 07/11/23 -  Ariana Crump PT Physical Therapist PT                    Vestibular Exam    Smooth pursuit: - normal   Spontaneous nystagmus: negative  Gaze holding nystagmus: positive left   Saccades: normal   Convergence/divergence: normal   VOR slow: normal  Head thrust test: negative   Head shaking nystagmus test: negative   Sidelying hallpike: positive     PT Recommendation and Plan     Plan of Care Reviewed With: patient  Progress: no change  Outcome Evaluation: Pt is a 73 yr/o male with PMHx of a-fib, HTN, and vertigo presenting to ED with dizziness. CT and MRI negative for acute abnormalities. He shows positive signs for vestibular involvement and vertiginous causes of dizziness with positive VOR testing vertically, as well as positive sidelying hallpike text and spinning sensation with positional changes. Saccades are normal and head thrust is normal. Performed canalith repositioning maneuvers per AIB protocol which revealed left posterior canalithiasis BPPV. This did resolve during the session, however he continued to c/o slight off balance feeling afterwards. Education given on process of canalith repositioning techniques and how this resolution can take 12-24 hours to take effect fully, pt wtih good understanding. Recommend outpatient vestibular PT to follow up on BPPV and possible vestibular hypofunction. Pt given options for PT in this setting and shows good motivation and good understanding. Nursing notified.     Time Calculation:   PT Evaluation Complexity  History, PT Evaluation Complexity: 1-2 personal factors and/or comorbidities  Examination of Body Systems (PT Eval Complexity): total of 3 or more elements  Clinical Presentation (PT Evaluation Complexity): evolving  Clinical Decision Making (PT  Evaluation Complexity): moderate complexity  Overall Complexity (PT Evaluation Complexity): moderate complexity     PT Charges       Row Name 09/20/23 1106             Time Calculation    Start Time 0915  -AD      Stop Time 1015  -AD      Time Calculation (min) 60 min  -AD      PT Received On 09/20/23  -AD      PT - Next Appointment 09/21/23  -AD      PT Goal Re-Cert Due Date 10/04/23  -AD         Time Calculation- PT    Total Timed Code Minutes- PT 0 minute(s)  -AD                User Key  (r) = Recorded By, (t) = Taken By, (c) = Cosigned By      Initials Name Provider Type    AD Ariana Crump, PT Physical Therapist                  Therapy Charges for Today       Code Description Service Date Service Provider Modifiers Qty    [id number] HC PT EVAL MOD COMPLEXITY 4 9/20/2023 Ariana Crump, PT GP 1            PT G-Codes  Outcome Measure Options: AM-PAC 6 Clicks Basic Mobility (PT)  AM-PAC 6 Clicks Score (PT): 19  PT Discharge Summary  Anticipated Discharge Disposition (PT): home with outpatient therapy services    Ariana Crump PT  9/20/2023

## 2023-09-20 NOTE — PROGRESS NOTES
Patient seen and examined.  Agree with assessment and plan as outlined in H&P.  MRI negative.  Neurology to evaluate.  PT to assess as well.  Supportive care.

## 2023-09-20 NOTE — PLAN OF CARE
Goal Outcome Evaluation:  Plan of Care Reviewed With: patient        Progress: no change  Outcome Evaluation: Pt is a 73 yr/o male with PMHx of a-fib, HTN, and vertigo presenting to ED with dizziness. CT and MRI negative for acute abnormalities. He shows positive signs for vestibular involvement and vertiginous causes of dizziness with positive VOR testing vertically, as well as positive sidelying hallpike text and spinning sensation with positional changes. Saccades are normal and head thrust is normal. Performed canalith repositioning maneuvers per AIB protocol which revealed left posterior canalithiasis BPPV. This did resolve during the session, however he continued to c/o slight off balance feeling afterwards. Education given on process of canalith repositioning techniques and how this resolution can take 12-24 hours to take effect fully, pt wtih good understanding. Recommend outpatient vestibular PT to follow up on BPPV and possible vestibular hypofunction. Pt given options for PT in this setting and shows good motivation and good understanding. Nursing notified.      Anticipated Discharge Disposition (PT): home with outpatient therapy services

## 2023-09-29 ENCOUNTER — ANTICOAGULATION VISIT (OUTPATIENT)
Dept: CARDIOLOGY | Facility: CLINIC | Age: 73
End: 2023-09-29
Payer: MEDICARE

## 2023-09-29 VITALS
HEART RATE: 63 BPM | SYSTOLIC BLOOD PRESSURE: 131 MMHG | DIASTOLIC BLOOD PRESSURE: 55 MMHG | BODY MASS INDEX: 32.61 KG/M2 | WEIGHT: 254 LBS

## 2023-09-29 DIAGNOSIS — I48.0 PAROXYSMAL ATRIAL FIBRILLATION: Primary | ICD-10-CM

## 2023-09-29 DIAGNOSIS — Z79.01 LONG TERM (CURRENT) USE OF ANTICOAGULANTS: ICD-10-CM

## 2023-09-29 LAB — INR PPP: 3.2 (ref 0.9–1.1)

## 2023-09-29 PROCEDURE — 36416 COLLJ CAPILLARY BLOOD SPEC: CPT | Performed by: INTERNAL MEDICINE

## 2023-09-29 PROCEDURE — 85610 PROTHROMBIN TIME: CPT | Performed by: INTERNAL MEDICINE

## 2023-10-09 ENCOUNTER — TELEPHONE (OUTPATIENT)
Dept: CARDIOLOGY | Facility: CLINIC | Age: 73
End: 2023-10-09
Payer: MEDICARE

## 2023-10-09 RX ORDER — FLECAINIDE ACETATE 50 MG/1
50 TABLET ORAL 2 TIMES DAILY
Qty: 60 TABLET | Refills: 5 | Status: SHIPPED | OUTPATIENT
Start: 2023-10-09

## 2023-10-09 NOTE — TELEPHONE ENCOUNTER
"Hub staff attempted to follow warm transfer process and was unsuccessful     Caller: Filipe Altamirano \"Arslan\"    Relationship to patient: Self    Best call back number: [phone]     Patient is needing: PATIENT CALLED IN STATING THE PAST 24 HOURS HIS APPLE WATCH IS TELLING HIM HE'S IN AFIB. PATIENT STATES HE IS NOT FEELING ANY SYMPTOMS. PATIENT WOULD LIKE DR. CHATMAN TO CALL HIM.               "

## 2023-10-09 NOTE — TELEPHONE ENCOUNTER
Spoke with patient, he is back in rhythm now.  It seems like however he is having more frequent paroxysmal A-fib.  We will go ahead and start him on flecainide in addition to his metoprolol.  We will see him in follow-up in January or sooner if he has more frequent A-fib or any issues with the medications.

## 2023-11-06 ENCOUNTER — ANTICOAGULATION VISIT (OUTPATIENT)
Dept: CARDIOLOGY | Facility: CLINIC | Age: 73
End: 2023-11-06
Payer: MEDICARE

## 2023-11-06 VITALS
BODY MASS INDEX: 30.56 KG/M2 | WEIGHT: 238 LBS | HEART RATE: 58 BPM | DIASTOLIC BLOOD PRESSURE: 65 MMHG | SYSTOLIC BLOOD PRESSURE: 121 MMHG

## 2023-11-06 DIAGNOSIS — I48.0 PAROXYSMAL ATRIAL FIBRILLATION: Primary | ICD-10-CM

## 2023-11-06 DIAGNOSIS — Z79.01 LONG TERM (CURRENT) USE OF ANTICOAGULANTS: ICD-10-CM

## 2023-11-06 LAB — INR PPP: 3.9 (ref 2–3)

## 2023-11-06 PROCEDURE — 36416 COLLJ CAPILLARY BLOOD SPEC: CPT | Performed by: INTERNAL MEDICINE

## 2023-11-06 PROCEDURE — 85610 PROTHROMBIN TIME: CPT | Performed by: INTERNAL MEDICINE

## 2023-11-06 NOTE — TELEPHONE ENCOUNTER
Rx Refill Note  Requested Prescriptions     Pending Prescriptions Disp Refills    metoprolol tartrate (LOPRESSOR) 25 MG tablet [Pharmacy Med Name: METOPROLOL TARTRATE 25 MG TAB] 270 tablet      Sig: TAKE ONE TABLET BY MOUTH THREE TIMES A DAY      Last office visit with prescribing clinician: 5/9/2023   Last telemedicine visit with prescribing clinician: Visit date not found   Next office visit with prescribing clinician: 1/9/2024                         Would you like a call back once the refill request has been completed: [] Yes [] No    If the office needs to give you a call back, can they leave a voicemail: [] Yes [] No    Nidia Singer MA  11/06/23, 08:16 EST

## 2023-11-13 ENCOUNTER — ANTICOAGULATION VISIT (OUTPATIENT)
Dept: CARDIOLOGY | Facility: CLINIC | Age: 73
End: 2023-11-13
Payer: MEDICARE

## 2023-11-13 VITALS — DIASTOLIC BLOOD PRESSURE: 66 MMHG | SYSTOLIC BLOOD PRESSURE: 140 MMHG | HEART RATE: 56 BPM

## 2023-11-13 DIAGNOSIS — Z79.01 LONG TERM (CURRENT) USE OF ANTICOAGULANTS: ICD-10-CM

## 2023-11-13 DIAGNOSIS — I48.0 PAROXYSMAL ATRIAL FIBRILLATION: Primary | ICD-10-CM

## 2023-11-13 LAB — INR PPP: 2.8 (ref 2–3)

## 2023-11-13 PROCEDURE — 85610 PROTHROMBIN TIME: CPT | Performed by: INTERNAL MEDICINE

## 2023-11-13 PROCEDURE — 36416 COLLJ CAPILLARY BLOOD SPEC: CPT | Performed by: INTERNAL MEDICINE

## 2023-11-25 DIAGNOSIS — Z79.01 LONG TERM (CURRENT) USE OF ANTICOAGULANTS: ICD-10-CM

## 2023-11-25 DIAGNOSIS — I48.0 PAROXYSMAL ATRIAL FIBRILLATION: Primary | ICD-10-CM

## 2023-11-27 RX ORDER — WARFARIN SODIUM 5 MG/1
TABLET ORAL
Qty: 125 TABLET | Refills: 0 | Status: SHIPPED | OUTPATIENT
Start: 2023-11-27

## 2023-11-27 NOTE — TELEPHONE ENCOUNTER
Rx Refill Note  Requested Prescriptions     Pending Prescriptions Disp Refills    warfarin (COUMADIN) 5 MG tablet [Pharmacy Med Name: WARFARIN SODIUM 5 MG TABLET] 125 tablet 0     Sig: Take 1 1/2 tabs, by mouth, daily except 5 mgs on Mon and Fri or as directed.   Last INR 11/13/23  Last office visit with prescribing clinician: 5/9/2023   Last telemedicine visit with prescribing clinician: Visit date not found   Next office visit with prescribing clinician: 1/9/2024                         Would you like a call back once the refill request has been completed: [] Yes [] No    If the office needs to give you a call back, can they leave a voicemail: [] Yes [] No    Lacy Salinas RN  11/27/23, 09:45 EST

## 2023-12-15 ENCOUNTER — ANTICOAGULATION VISIT (OUTPATIENT)
Dept: CARDIOLOGY | Facility: CLINIC | Age: 73
End: 2023-12-15
Payer: MEDICARE

## 2023-12-15 VITALS
WEIGHT: 265 LBS | BODY MASS INDEX: 34.02 KG/M2 | HEART RATE: 60 BPM | DIASTOLIC BLOOD PRESSURE: 71 MMHG | SYSTOLIC BLOOD PRESSURE: 123 MMHG

## 2023-12-15 DIAGNOSIS — I48.0 PAROXYSMAL ATRIAL FIBRILLATION: Primary | ICD-10-CM

## 2023-12-15 DIAGNOSIS — Z79.01 LONG TERM (CURRENT) USE OF ANTICOAGULANTS: ICD-10-CM

## 2023-12-15 LAB — INR PPP: 2.5 (ref 0.9–1.1)

## 2023-12-15 PROCEDURE — 85610 PROTHROMBIN TIME: CPT | Performed by: INTERNAL MEDICINE

## 2023-12-15 PROCEDURE — 36416 COLLJ CAPILLARY BLOOD SPEC: CPT | Performed by: INTERNAL MEDICINE

## 2024-01-09 ENCOUNTER — OFFICE VISIT (OUTPATIENT)
Dept: CARDIOLOGY | Facility: CLINIC | Age: 74
End: 2024-01-09
Payer: MEDICARE

## 2024-01-09 ENCOUNTER — ANTICOAGULATION VISIT (OUTPATIENT)
Dept: CARDIOLOGY | Facility: CLINIC | Age: 74
End: 2024-01-09
Payer: MEDICARE

## 2024-01-09 VITALS
WEIGHT: 259 LBS | HEART RATE: 57 BPM | BODY MASS INDEX: 33.24 KG/M2 | SYSTOLIC BLOOD PRESSURE: 118 MMHG | DIASTOLIC BLOOD PRESSURE: 64 MMHG | HEIGHT: 74 IN

## 2024-01-09 VITALS
BODY MASS INDEX: 33.25 KG/M2 | HEART RATE: 57 BPM | WEIGHT: 259 LBS | SYSTOLIC BLOOD PRESSURE: 118 MMHG | DIASTOLIC BLOOD PRESSURE: 64 MMHG

## 2024-01-09 DIAGNOSIS — I48.0 PAROXYSMAL ATRIAL FIBRILLATION: Primary | ICD-10-CM

## 2024-01-09 DIAGNOSIS — Z79.01 LONG TERM (CURRENT) USE OF ANTICOAGULANTS: ICD-10-CM

## 2024-01-09 DIAGNOSIS — E78.2 MIXED HYPERLIPIDEMIA: ICD-10-CM

## 2024-01-09 DIAGNOSIS — I10 ESSENTIAL HYPERTENSION: Chronic | ICD-10-CM

## 2024-01-09 LAB — INR PPP: 2.2 (ref 2–3)

## 2024-01-09 PROCEDURE — 36416 COLLJ CAPILLARY BLOOD SPEC: CPT | Performed by: INTERNAL MEDICINE

## 2024-01-09 PROCEDURE — 85610 PROTHROMBIN TIME: CPT | Performed by: INTERNAL MEDICINE

## 2024-01-09 NOTE — PROGRESS NOTES
Progress note      Name: Filipe Altamirano ADMIT: (Not on file)   : 1950  PCP: Frantz Saleem MD    MRN: [id number] LOS: 0 days   AGE/SEX: 73 y.o. male  ROOM: Room/bed info not found     Chief Complaint   Patient presents with    Follow-up     8mo f/u with INR       Subjective       History of present illness  Filipe Altamirano  is a 73-year-old male patient was history of paroxysmal atrial fibrillation diagnosed initially in , here today for follow-up.  Patient is doing well denies any chest pain or shortness of breath, he does notice his heart rate being in the 50s most of the time, otherwise no syncopal episodes no lower extremity edema, no recent hospitalizations.  Patient had an episode of A-fib in 2023, flecainide was called and by he has not started taking it yet.  He has not had any A-fib since then.    Past Medical History:   Diagnosis Date    Atrial fibrillation     Disease of thyroid gland     Hard of hearing     Hyperlipidemia     Hypertension     PONV (postoperative nausea and vomiting)     Vertigo      Past Surgical History:   Procedure Laterality Date    ABDOMINAL SURGERY      APPENDECTOMY      CATARACT EXTRACTION Right     COLON SURGERY      HERNIA REPAIR       Family History   Problem Relation Age of Onset    No Known Problems Mother     No Known Problems Father     Arrhythmia Brother      Social History     Tobacco Use    Smoking status: Former     Packs/day: 0.00     Years: 10.00     Additional pack years: 0.00     Total pack years: 0.00     Types: Cigarettes     Start date: 6/15/1969     Quit date: 6/15/1979     Years since quittin.6     Passive exposure: Never    Smokeless tobacco: Never    Tobacco comments:     While in the [employer]   Vaping Use    Vaping Use: Never used   Substance Use Topics    Alcohol use: Yes     Alcohol/week: 1.0 standard drink of alcohol     Types: 1 Glasses of wine per week     Comment: occasionally    Drug use: No       Current Outpatient Medications:      Cholecalciferol (Vitamin D3) 50 MCG (2000 UT) capsule, Take 1 capsule by mouth Daily., Disp: , Rfl:     levothyroxine (SYNTHROID, LEVOTHROID) 100 MCG tablet, Take 1 tablet by mouth Daily., Disp: , Rfl:     losartan (COZAAR) 25 MG tablet, Take 1 tablet by mouth Daily., Disp: 90 tablet, Rfl: 2    methylPREDNISolone (MEDROL) 4 MG dose pack, Take as directed on package instructions., Disp: 21 tablet, Rfl: 0    metoprolol tartrate (LOPRESSOR) 25 MG tablet, Take 1 tablet by mouth 2 (Two) Times a Day., Disp: , Rfl:     rosuvastatin (CRESTOR) 5 MG tablet, Take 1 tablet by mouth Daily., Disp: , Rfl:     warfarin (COUMADIN) 5 MG tablet, Take 1 1/2 tabs, by mouth, daily except 5 mgs on Mon and Fri or as directed., Disp: 125 tablet, Rfl: 0    flecainide (TAMBOCOR) 50 MG tablet, Take 1 tablet by mouth 2 (Two) Times a Day. (Patient not taking: Reported on 1/9/2024), Disp: 60 tablet, Rfl: 5  Allergies:  Codeine      Physical Exam  VITALS REVIEWED    General:      well developed, in no acute distress.    Head:      normocephalic and atraumatic.    Eyes:      PERRL/EOM intact, conjunctiva and sclera clear with out nystagmus.    Neck:      no masses, thyromegaly,  trachea central with normal respiratory effort and PMI displaced laterally  Lungs:      Clear to auscultation bilaterally  Heart:       Regular rate and rhythm  Msk:      no deformity or scoliosis noted of thoracic or lumbar spine.    Pulses:      pulses normal in all 4 extremities.    Extremities:       No lower extremity edema  Neurologic:      no focal deficits.   alert oriented x3  Skin:      intact without lesions or rashes.    Psych:      alert and cooperative; normal mood and affect; normal attention span and concentration.      Result Review :               Pertinent cardiac workup    Echo on 3/17/2020 ejection fraction 55% mild to moderate aortic regurgitation  Telemetry strips from 3/5/2020(first trip) atrial flutter      Procedures        Assessment and Plan       Filipe Altamirano is a 73-year-old male patient who has history of atrial fibrillation and atrial flutter diagnosed initially in 2020.  Patient is on metoprolol and Coumadin for stroke prevention.  His A-fib episodes are rare, last 1 was in October 2023.  Flecainide was called but he never took it.  Today we did an EKG on his watch and he was in sinus rhythm.  Patient would like to stay on his current medications since he is feeling well.  He is going to follow-up in 8 months.  Advised him to call me back if he has any issues regarding his heart rhythm.  His blood pressure is well-controlled and he does not have any anginal symptoms or CHF symptoms.    Diagnoses and all orders for this visit:    1. Paroxysmal atrial fibrillation (Primary)    2. Essential hypertension    3. Long term (current) use of anticoagulants    4. Mixed hyperlipidemia    Other orders  -     metoprolol tartrate (LOPRESSOR) 25 MG tablet; Take 1 tablet by mouth 2 (Two) Times a Day.           No follow-ups on file.  Patient was given instructions and counseling regarding his condition or for health maintenance advice. Please see specific information pulled into the AVS if appropriate.

## 2024-02-20 ENCOUNTER — ANTICOAGULATION VISIT (OUTPATIENT)
Dept: CARDIOLOGY | Facility: CLINIC | Age: 74
End: 2024-02-20
Payer: MEDICARE

## 2024-02-20 VITALS
SYSTOLIC BLOOD PRESSURE: 144 MMHG | HEART RATE: 53 BPM | BODY MASS INDEX: 32.35 KG/M2 | WEIGHT: 252 LBS | DIASTOLIC BLOOD PRESSURE: 69 MMHG

## 2024-02-20 DIAGNOSIS — Z79.01 LONG TERM (CURRENT) USE OF ANTICOAGULANTS: ICD-10-CM

## 2024-02-20 DIAGNOSIS — I48.0 PAROXYSMAL ATRIAL FIBRILLATION: Primary | ICD-10-CM

## 2024-02-20 LAB — INR PPP: 2 (ref 2–3)

## 2024-02-20 PROCEDURE — 36416 COLLJ CAPILLARY BLOOD SPEC: CPT | Performed by: INTERNAL MEDICINE

## 2024-02-20 PROCEDURE — 85610 PROTHROMBIN TIME: CPT | Performed by: INTERNAL MEDICINE

## 2024-02-22 DIAGNOSIS — Z79.01 LONG TERM (CURRENT) USE OF ANTICOAGULANTS: ICD-10-CM

## 2024-02-22 DIAGNOSIS — I48.0 PAROXYSMAL ATRIAL FIBRILLATION: ICD-10-CM

## 2024-02-22 RX ORDER — WARFARIN SODIUM 5 MG/1
TABLET ORAL
Qty: 121 TABLET | Refills: 0 | Status: SHIPPED | OUTPATIENT
Start: 2024-02-22

## 2024-02-22 NOTE — TELEPHONE ENCOUNTER
Rx Refill Note  Requested Prescriptions     Pending Prescriptions Disp Refills    warfarin (COUMADIN) 5 MG tablet [Pharmacy Med Name: WARFARIN SODIUM 5 MG TABLET] 121 tablet      Sig: TAKE 1 AND 1/2 TABLET BY MOUTH DAILY, EXCEPT TAKE 1 TABLET BY MOUTH ON MONDAY AND FRIDAY OR AS DIRECTED BY PRESCRIBER      Last office visit with prescribing clinician: 1/9/2024   Last telemedicine visit with prescribing clinician: Visit date not found   Next office visit with prescribing clinician: 9/4/2024   Anticoagulation Visit (02/20/2024)     Priyanka Castro LPN  02/22/24, 14:04 EST

## 2024-04-02 ENCOUNTER — ANTICOAGULATION VISIT (OUTPATIENT)
Dept: CARDIOLOGY | Facility: CLINIC | Age: 74
End: 2024-04-02
Payer: MEDICARE

## 2024-04-02 VITALS
HEART RATE: 57 BPM | BODY MASS INDEX: 33 KG/M2 | SYSTOLIC BLOOD PRESSURE: 119 MMHG | WEIGHT: 257 LBS | DIASTOLIC BLOOD PRESSURE: 72 MMHG

## 2024-04-02 DIAGNOSIS — I48.0 PAROXYSMAL ATRIAL FIBRILLATION: Primary | ICD-10-CM

## 2024-04-02 DIAGNOSIS — Z79.01 LONG TERM (CURRENT) USE OF ANTICOAGULANTS: ICD-10-CM

## 2024-04-02 LAB — INR PPP: 2.5 (ref 2–3)

## 2024-04-02 PROCEDURE — 85610 PROTHROMBIN TIME: CPT | Performed by: INTERNAL MEDICINE

## 2024-04-02 PROCEDURE — 36416 COLLJ CAPILLARY BLOOD SPEC: CPT | Performed by: INTERNAL MEDICINE

## 2024-05-13 ENCOUNTER — ANTICOAGULATION VISIT (OUTPATIENT)
Dept: CARDIOLOGY | Facility: CLINIC | Age: 74
End: 2024-05-13
Payer: MEDICARE

## 2024-05-13 VITALS
DIASTOLIC BLOOD PRESSURE: 79 MMHG | WEIGHT: 257 LBS | BODY MASS INDEX: 33 KG/M2 | HEART RATE: 60 BPM | SYSTOLIC BLOOD PRESSURE: 127 MMHG

## 2024-05-13 DIAGNOSIS — Z79.01 LONG TERM (CURRENT) USE OF ANTICOAGULANTS: ICD-10-CM

## 2024-05-13 DIAGNOSIS — I48.0 PAROXYSMAL ATRIAL FIBRILLATION: Primary | ICD-10-CM

## 2024-05-13 LAB — INR PPP: 2.6 (ref 2–3)

## 2024-05-13 PROCEDURE — 85610 PROTHROMBIN TIME: CPT | Performed by: INTERNAL MEDICINE

## 2024-05-13 PROCEDURE — 36416 COLLJ CAPILLARY BLOOD SPEC: CPT | Performed by: INTERNAL MEDICINE

## 2024-05-14 DIAGNOSIS — Z79.01 LONG TERM (CURRENT) USE OF ANTICOAGULANTS: ICD-10-CM

## 2024-05-14 DIAGNOSIS — I48.0 PAROXYSMAL ATRIAL FIBRILLATION: ICD-10-CM

## 2024-05-14 RX ORDER — WARFARIN SODIUM 5 MG/1
TABLET ORAL
Qty: 110 TABLET | Refills: 0 | Status: SHIPPED | OUTPATIENT
Start: 2024-05-14

## 2024-05-14 NOTE — TELEPHONE ENCOUNTER
Rx Refill Note  Requested Prescriptions     Pending Prescriptions Disp Refills    warfarin (COUMADIN) 5 MG tablet [Pharmacy Med Name: WARFARIN SODIUM 5 MG TABLET] 110 tablet 0     Sig: Take 1 1/2 tabs, by mouth, daily except 1 tab on Mon, Weds and Fri or as directed.    Last INR 5/13/24  Last office visit with prescribing clinician: 1/9/2024   Last telemedicine visit with prescribing clinician: Visit date not found   Next office visit with prescribing clinician: 9/4/2024                         Would you like a call back once the refill request has been completed: [] Yes [] No    If the office needs to give you a call back, can they leave a voicemail: [] Yes [] No    Lacy Salinas RN  05/14/24, 10:32 EDT

## 2024-05-29 NOTE — CONSULTS
Referring Provider: Dr. sommers  Reason for Consultation: Atrial fibrillation with rapid ventricular rate    Patient Care Team:  Frantz Saleem MD as PCP - General (Internal Medicine)    Chief complaint palpitations        History of present illness:  Filipe Altamirano is a 70 y.o. male who presents with sudden onset of palpitations.  70-year-old white male patient with previous history of hypertension dyslipidemia history of hypothyroidism no cardiac issues had a negative stress test November 2019 now admitted to the hospital with an episode of palpitation and found to have new onset atrial fibrillation with rapid ventricular rate  Patient subsequently converted into sinus rhythm somewhat spontaneously  Patient is staying in sinus rhythm without any further symptoms denies of any chest pain shortness of breath syncopal episodes lower extremity edema PND orthopnea    Review of Systems   Constitution: Positive for malaise/fatigue. Negative for fever.   HENT: Negative for congestion and hearing loss.    Eyes: Negative for double vision and visual disturbance.   Cardiovascular: Positive for palpitations. Negative for chest pain, claudication, dyspnea on exertion, leg swelling and syncope.   Respiratory: Negative for cough and shortness of breath.    Endocrine: Negative for cold intolerance.   Skin: Negative for color change and rash.   Musculoskeletal: Negative for arthritis and joint pain.   Gastrointestinal: Negative for abdominal pain and heartburn.   Genitourinary: Negative for hematuria.   Neurological: Negative for excessive daytime sleepiness and dizziness.   Psychiatric/Behavioral: Negative for depression. The patient is not nervous/anxious.    All other systems reviewed and are negative.      History  Past Medical History:   Diagnosis Date   • Disease of thyroid gland    • Hyperlipidemia    • Hypertension    • Vertigo        Past Surgical History:   Procedure Laterality Date   • ABDOMINAL SURGERY     •  "COLON SURGERY     • HERNIA REPAIR         Family history of hypertension    Social History     Tobacco Use   • Smoking status: Former Smoker     Last attempt to quit: 2009     Years since quittin.1   • Smokeless tobacco: Never Used   Substance Use Topics   • Alcohol use: No     Frequency: Never   • Drug use: No        Medications Prior to Admission   Medication Sig Dispense Refill Last Dose   • levothyroxine (SYNTHROID, LEVOTHROID) 25 MCG tablet Take 25 mcg by mouth Every Evening.   3/4/2020 at Unknown time   • lisinopril (PRINIVIL,ZESTRIL) 10 MG tablet Take 10 mg by mouth Every Evening.   3/4/2020 at Unknown time         Codeine    Scheduled Meds:  enoxaparin 110 mg Subcutaneous Q12H   levothyroxine 25 mcg Oral Q PM   lisinopril 10 mg Oral Q PM   sodium chloride 10 mL Intravenous Q12H     Continuous Infusions:  dilTIAZem 5-15 mg/hr Last Rate: Stopped (20 0129)     PRN Meds:.•  acetaminophen **OR** acetaminophen **OR** acetaminophen  •  aluminum-magnesium hydroxide-simethicone  •  Calcium Gluconate-NaCl **AND** calcium gluconate **AND** Calcium, Ionized  •  docusate sodium  •  influenza vaccine  •  ketamine (KETALAR) infusion **AND** Ketamine Vital Signs & Assessment  •  ketorolac  •  magnesium sulfate **OR** magnesium sulfate **OR** magnesium sulfate  •  melatonin  •  nitroglycerin  •  ondansetron **OR** ondansetron  •  potassium & sodium phosphates **OR** potassium & sodium phosphates  •  potassium chloride  •  potassium chloride  •  [COMPLETED] Insert peripheral IV **AND** sodium chloride  •  sodium chloride        VITAL SIGNS  Vitals:    20 0722 20 0723 20 0724 20 0726   BP:       BP Location:       Patient Position:       Pulse: 83 75 73 67   Resp:       Temp:       TempSrc:       SpO2: 96% 97% 97% 97%   Weight:       Height:           Flowsheet Rows      First Filed Value   Admission Height  190.5 cm (75\") Documented at 2020 1531   Admission Weight  105 kg (231 lb) " Documented at 03/05/2020 1531           TELEMETRY: Sinus rhythm    Physical Exam:  Physical Exam   Constitutional: He is oriented to person, place, and time. He appears well-developed and well-nourished. He is cooperative.   HENT:   Head: Normocephalic and atraumatic.   Mouth/Throat: Uvula is midline and oropharynx is clear and moist. No oral lesions.   Eyes: Conjunctivae are normal. No scleral icterus.   Neck: Trachea normal. Neck supple. No JVD present. Carotid bruit is not present. No thyromegaly present.   Cardiovascular: Normal rate, regular rhythm, S1 normal, S2 normal, normal heart sounds, intact distal pulses and normal pulses. PMI is not displaced. Exam reveals no gallop and no friction rub.   No murmur heard.  Pulmonary/Chest: Effort normal and breath sounds normal.   Abdominal: Soft. Bowel sounds are normal.   Musculoskeletal: Normal range of motion.   Neurological: He is alert and oriented to person, place, and time. He has normal strength.   No focal deficits   Skin: Skin is warm. No cyanosis.   Psychiatric: He has a normal mood and affect.                LAB RESULTS (LAST 7 DAYS)    CBC  Results from last 7 days   Lab Units 03/06/20  0607 03/05/20  1550   WBC 10*3/mm3 8.00 9.10   RBC 10*6/mm3 5.20 5.63   HEMOGLOBIN g/dL 16.2 17.6   HEMATOCRIT % 47.3 51.2*   MCV fL 91.0 90.9   PLATELETS 10*3/mm3 226 246       BMP  Results from last 7 days   Lab Units 03/06/20  0607 03/06/20  0016 03/05/20  1550   SODIUM mmol/L 144  --  143   POTASSIUM mmol/L 4.2 3.9 3.6   CHLORIDE mmol/L 107  --  105   CO2 mmol/L 26.0  --  22.0   BUN mg/dL 12  --  11   CREATININE mg/dL 1.01  --  0.96   GLUCOSE mg/dL 104*  --  151*   MAGNESIUM mg/dL 2.1  --   --    PHOSPHORUS mg/dL 3.5  --   --        CMP Results from last 7 days   Lab Units 03/06/20  0607 03/06/20  0016 03/05/20  1550   SODIUM mmol/L 144  --  143   POTASSIUM mmol/L 4.2 3.9 3.6   CHLORIDE mmol/L 107  --  105   CO2 mmol/L 26.0  --  22.0   BUN mg/dL 12  --  11   CREATININE  mg/dL 1.01  --  0.96   GLUCOSE mg/dL 104*  --  151*   ALBUMIN g/dL 4.20  --   --    BILIRUBIN mg/dL 0.7  --   --    ALK PHOS U/L 65  --   --    AST (SGOT) U/L 17  --   --    ALT (SGPT) U/L 19  --   --          BNP        TROPONIN  Results from last 7 days   Lab Units 03/06/20  0607   CK TOTAL U/L 112   TROPONIN T ng/mL <0.010       CoAg  Results from last 7 days   Lab Units 03/06/20  0607 03/06/20  0016   INR  1.08 1.11*       Creatinine Clearance  Estimated Creatinine Clearance: 87.4 mL/min (by C-G formula based on SCr of 1.01 mg/dL).    ABG        Radiology  Xr Chest 1 View    Result Date: 3/5/2020  No acute cardiopulmonary abnormality.  Electronically Signed By-Krishan Ritchie On:3/5/2020 5:50 PM This report was finalized on [id number] by  Krishan Ritchie, .          EKG          I personally viewed and interpreted the patient's EKG/Telemetry data:    ECHOCARDIOGRAM:         STRESS MYOVIEW:    CARDIAC CATHETERIZATION:    OTHER:         Assessment/Plan       Atrial fibrillation with rapid ventricular response (CMS/HCC)    Essential hypertension    Hypothyroidism (acquired)      New onset atrial fibrillation with rapid ventricle rate  Free T4 is normal  Hypertension well controlled  Treadmill stress test negative for ischemia recently  Patient is in sinus rhythm maintaining sinus rhythm  We will start Coumadin and check the INR in the next few days  Start metoprolol 25 mg twice daily if the heart rate is low he will take 12.5 twice daily  Decrease lisinopril to 5 mg every day  Needs aggressive control of cardiac risk factors including dyslipidemia  I discussed the patients findings and my recommendations with patient and attending nurse    Fracisco Villareal MD  03/06/20  9:55 AM               show

## 2024-06-25 ENCOUNTER — ANTICOAGULATION VISIT (OUTPATIENT)
Dept: CARDIOLOGY | Facility: CLINIC | Age: 74
End: 2024-06-25
Payer: MEDICARE

## 2024-06-25 VITALS
SYSTOLIC BLOOD PRESSURE: 137 MMHG | DIASTOLIC BLOOD PRESSURE: 68 MMHG | HEART RATE: 59 BPM | BODY MASS INDEX: 33 KG/M2 | WEIGHT: 257 LBS

## 2024-06-25 DIAGNOSIS — Z79.01 LONG TERM (CURRENT) USE OF ANTICOAGULANTS: ICD-10-CM

## 2024-06-25 DIAGNOSIS — I48.0 PAROXYSMAL ATRIAL FIBRILLATION: Primary | ICD-10-CM

## 2024-06-25 LAB — INR PPP: 2.6 (ref 2–3)

## 2024-06-25 PROCEDURE — 85610 PROTHROMBIN TIME: CPT | Performed by: INTERNAL MEDICINE

## 2024-06-25 PROCEDURE — 36416 COLLJ CAPILLARY BLOOD SPEC: CPT | Performed by: INTERNAL MEDICINE

## 2024-07-25 ENCOUNTER — TELEPHONE (OUTPATIENT)
Dept: CARDIOLOGY | Facility: CLINIC | Age: 74
End: 2024-07-25
Payer: MEDICARE

## 2024-07-25 NOTE — TELEPHONE ENCOUNTER
FACILITY: [facility]  DR: GALO LOMBARDI  PHONE: [phone]  FAX: [phone]  PROCEDURE: LUMBAR VAUGHN  SCHEDULED: ASAP  MEDS TO HOLD: WARFARIN FOR 5 DAYS    GAVE TO  FOR REVIEW.

## 2024-08-07 DIAGNOSIS — I48.0 PAROXYSMAL ATRIAL FIBRILLATION: ICD-10-CM

## 2024-08-07 DIAGNOSIS — Z79.01 LONG TERM (CURRENT) USE OF ANTICOAGULANTS: ICD-10-CM

## 2024-08-07 RX ORDER — WARFARIN SODIUM 5 MG/1
TABLET ORAL
Qty: 110 TABLET | Refills: 0 | Status: SHIPPED | OUTPATIENT
Start: 2024-08-07

## 2024-08-07 NOTE — TELEPHONE ENCOUNTER
Rx Refill Note  Requested Prescriptions     Pending Prescriptions Disp Refills    warfarin (COUMADIN) 5 MG tablet [Pharmacy Med Name: WARFARIN SODIUM 5 MG TABLET] 110 tablet 0     Sig: TAKE 1 1/2 TABLETS BY MOUTH DAILY EXCEPT 1 TABLET ON MONDAY, WEDNESDAY AND FRIDAY OR AS DIRECTED    Last INR 6/25/24  Last office visit with prescribing clinician: 1/9/2024   Last telemedicine visit with prescribing clinician: Visit date not found   Next office visit with prescribing clinician: 9/24/2024                         Would you like a call back once the refill request has been completed: [] Yes [] No    If the office needs to give you a call back, can they leave a voicemail: [] Yes [] No    Lacy Salinas RN  08/07/24, 12:20 EDT

## 2024-08-22 ENCOUNTER — TELEPHONE (OUTPATIENT)
Dept: CARDIOLOGY | Facility: CLINIC | Age: 74
End: 2024-08-22

## 2024-08-22 ENCOUNTER — ANTICOAGULATION VISIT (OUTPATIENT)
Dept: CARDIOLOGY | Facility: CLINIC | Age: 74
End: 2024-08-22
Payer: MEDICARE

## 2024-08-22 VITALS
WEIGHT: 255 LBS | SYSTOLIC BLOOD PRESSURE: 133 MMHG | BODY MASS INDEX: 32.74 KG/M2 | HEART RATE: 57 BPM | DIASTOLIC BLOOD PRESSURE: 73 MMHG

## 2024-08-22 DIAGNOSIS — I48.0 PAROXYSMAL ATRIAL FIBRILLATION: Primary | ICD-10-CM

## 2024-08-22 DIAGNOSIS — Z79.01 LONG TERM (CURRENT) USE OF ANTICOAGULANTS: ICD-10-CM

## 2024-08-22 LAB — INR PPP: 2.9 (ref 2–3)

## 2024-08-22 PROCEDURE — 36416 COLLJ CAPILLARY BLOOD SPEC: CPT | Performed by: INTERNAL MEDICINE

## 2024-08-22 PROCEDURE — 85610 PROTHROMBIN TIME: CPT | Performed by: INTERNAL MEDICINE

## 2024-08-22 NOTE — TELEPHONE ENCOUNTER
Agree with future workup if further episodes occur. No other recommendations. Just continue to watch for other episodes.

## 2024-08-22 NOTE — TELEPHONE ENCOUNTER
Pt in for protime today. Pt states on Saturday he had a brief episode lasting about 1 hour where he was confused. He denies any HA, vision issues, or speech complications. He states he just had a hard time organizing his day. Pt had held warfarin 2 weeks prior for a steroid injection in his back by [facility]. He had been back on warfarin for 1 week when this episode occurred. He resumed his normal activities after this 1 hour episode and has felt fine since then. INR today is therapeutic at 2.9.     Pt just wanted to make us aware. I advised pt if this episode reoccurs he needs to go get evaluated to rule out possible TIA. Pt verbalizes understanding      Please advised if you have any other recommendations.       Thanks

## 2024-09-24 ENCOUNTER — ANTICOAGULATION VISIT (OUTPATIENT)
Dept: CARDIOLOGY | Facility: CLINIC | Age: 74
End: 2024-09-24
Payer: MEDICARE

## 2024-09-24 ENCOUNTER — OFFICE VISIT (OUTPATIENT)
Dept: CARDIOLOGY | Facility: CLINIC | Age: 74
End: 2024-09-24
Payer: MEDICARE

## 2024-09-24 VITALS
HEART RATE: 53 BPM | DIASTOLIC BLOOD PRESSURE: 82 MMHG | SYSTOLIC BLOOD PRESSURE: 137 MMHG | WEIGHT: 257 LBS | HEIGHT: 74 IN | BODY MASS INDEX: 32.98 KG/M2

## 2024-09-24 VITALS
HEART RATE: 53 BPM | SYSTOLIC BLOOD PRESSURE: 137 MMHG | BODY MASS INDEX: 33 KG/M2 | WEIGHT: 257 LBS | DIASTOLIC BLOOD PRESSURE: 82 MMHG

## 2024-09-24 DIAGNOSIS — I48.0 PAROXYSMAL ATRIAL FIBRILLATION: Primary | ICD-10-CM

## 2024-09-24 DIAGNOSIS — I48.92 ATRIAL FLUTTER WITH RAPID VENTRICULAR RESPONSE: ICD-10-CM

## 2024-09-24 DIAGNOSIS — Z79.01 LONG TERM (CURRENT) USE OF ANTICOAGULANTS: ICD-10-CM

## 2024-09-24 LAB — INR PPP: 2.9 (ref 2–3)

## 2024-09-24 PROCEDURE — 1160F RVW MEDS BY RX/DR IN RCRD: CPT | Performed by: INTERNAL MEDICINE

## 2024-09-24 PROCEDURE — 36416 COLLJ CAPILLARY BLOOD SPEC: CPT | Performed by: INTERNAL MEDICINE

## 2024-09-24 PROCEDURE — 85610 PROTHROMBIN TIME: CPT | Performed by: INTERNAL MEDICINE

## 2024-09-24 PROCEDURE — 93000 ELECTROCARDIOGRAM COMPLETE: CPT | Performed by: INTERNAL MEDICINE

## 2024-09-24 PROCEDURE — 1159F MED LIST DOCD IN RCRD: CPT | Performed by: INTERNAL MEDICINE

## 2024-09-24 PROCEDURE — 3079F DIAST BP 80-89 MM HG: CPT | Performed by: INTERNAL MEDICINE

## 2024-09-24 PROCEDURE — 99213 OFFICE O/P EST LOW 20 MIN: CPT | Performed by: INTERNAL MEDICINE

## 2024-09-24 PROCEDURE — 3075F SYST BP GE 130 - 139MM HG: CPT | Performed by: INTERNAL MEDICINE

## 2024-10-28 DIAGNOSIS — Z79.01 LONG TERM (CURRENT) USE OF ANTICOAGULANTS: ICD-10-CM

## 2024-10-28 DIAGNOSIS — I48.0 PAROXYSMAL ATRIAL FIBRILLATION: Primary | ICD-10-CM

## 2024-10-28 RX ORDER — WARFARIN SODIUM 5 MG/1
TABLET ORAL
Qty: 115 TABLET | Refills: 0 | Status: SHIPPED | OUTPATIENT
Start: 2024-10-28

## 2024-10-28 RX ORDER — METOPROLOL TARTRATE 25 MG/1
25 TABLET, FILM COATED ORAL 3 TIMES DAILY
Qty: 270 TABLET | Refills: 1 | Status: SHIPPED | OUTPATIENT
Start: 2024-10-28

## 2024-10-28 NOTE — TELEPHONE ENCOUNTER
Rx Refill Note  Requested Prescriptions     Pending Prescriptions Disp Refills    metoprolol tartrate (LOPRESSOR) 25 MG tablet [Pharmacy Med Name: METOPROLOL TARTRATE 25 MG TAB] 270 tablet      Sig: TAKE ONE TABLET BY MOUTH THREE TIMES A DAY    warfarin (COUMADIN) 5 MG tablet [Pharmacy Med Name: WARFARIN SODIUM 5 MG TABLET] 110 tablet 0     Sig: TAKE 1 AND 1/2 TABLET BY MOUTH DAILY EXCEPT TAKE ONE TABLET BY MOUTH ON MONDAYS, WEDNESDAYS, AND FRIDAYS OR AS DIRECTED      Last office visit with prescribing clinician: 9/24/2024   Last telemedicine visit with prescribing clinician: Visit date not found   Next office visit with prescribing clinician: 5/13/2025                         Would you like a call back once the refill request has been completed: [] Yes [] No    If the office needs to give you a call back, can they leave a voicemail: [] Yes [] No    Carlene Hernandez MA  10/28/24, 08:31 EDT

## 2024-11-06 ENCOUNTER — ANTICOAGULATION VISIT (OUTPATIENT)
Dept: CARDIOLOGY | Facility: CLINIC | Age: 74
End: 2024-11-06
Payer: MEDICARE

## 2024-11-06 VITALS — HEART RATE: 56 BPM | SYSTOLIC BLOOD PRESSURE: 143 MMHG | DIASTOLIC BLOOD PRESSURE: 74 MMHG

## 2024-11-06 DIAGNOSIS — Z79.01 LONG TERM (CURRENT) USE OF ANTICOAGULANTS: ICD-10-CM

## 2024-11-06 DIAGNOSIS — I48.0 PAROXYSMAL ATRIAL FIBRILLATION: Primary | ICD-10-CM

## 2024-11-06 LAB — INR PPP: 3.1 (ref 2–3)

## 2024-11-06 PROCEDURE — 85610 PROTHROMBIN TIME: CPT | Performed by: INTERNAL MEDICINE

## 2024-11-06 PROCEDURE — 36416 COLLJ CAPILLARY BLOOD SPEC: CPT | Performed by: INTERNAL MEDICINE

## 2024-11-26 ENCOUNTER — TELEPHONE (OUTPATIENT)
Dept: CARDIOLOGY | Facility: CLINIC | Age: 74
End: 2024-11-26
Payer: MEDICARE

## 2024-11-26 NOTE — TELEPHONE ENCOUNTER
FACILITY: [facility]  DR: GALO VIRAMONTES  PHONE: [phone]  FAX: [phone]  PROCEDURE: LUMBAR EPIDURAL INJECTION   SCHEDULED: TBD  MEDS TO HOLD: WARFARIN FOR 5 DAYS    PLACED ON PROVIDERS DESK FOR REVIEW.     Office Visit with Robret Brown MD (09/24/2024)

## 2024-12-07 ENCOUNTER — HOSPITAL ENCOUNTER (OUTPATIENT)
Facility: HOSPITAL | Age: 74
Setting detail: OBSERVATION
Discharge: HOME | End: 2024-12-08
Attending: EMERGENCY MEDICINE | Admitting: EMERGENCY MEDICINE
Payer: MEDICARE

## 2024-12-07 DIAGNOSIS — R55 NEAR SYNCOPE: ICD-10-CM

## 2024-12-07 DIAGNOSIS — I48.91 ATRIAL FIBRILLATION, UNSPECIFIED TYPE: Primary | ICD-10-CM

## 2024-12-07 LAB
ALBUMIN SERPL-MCNC: 4.3 G/DL (ref 3.5–5.2)
ALBUMIN/GLOB SERPL: 1.8 G/DL
ALP SERPL-CCNC: 70 U/L (ref 39–117)
ALT SERPL W P-5'-P-CCNC: 27 U/L (ref 1–41)
ANION GAP SERPL CALCULATED.3IONS-SCNC: 10.4 MMOL/L (ref 5–15)
APTT PPP: 26.2 SECONDS (ref 22.7–35.4)
AST SERPL-CCNC: 25 U/L (ref 1–40)
BASOPHILS # BLD AUTO: 0.03 10*3/MM3 (ref 0–0.2)
BASOPHILS NFR BLD AUTO: 0.2 % (ref 0–1.5)
BILIRUB SERPL-MCNC: 0.4 MG/DL (ref 0–1.2)
BUN SERPL-MCNC: 20 MG/DL (ref 8–23)
BUN/CREAT SERPL: 19.6 (ref 7–25)
CALCIUM SPEC-SCNC: 9.7 MG/DL (ref 8.6–10.5)
CHLORIDE SERPL-SCNC: 105 MMOL/L (ref 98–107)
CO2 SERPL-SCNC: 24.6 MMOL/L (ref 22–29)
CREAT SERPL-MCNC: 1.02 MG/DL (ref 0.76–1.27)
DEPRECATED RDW RBC AUTO: 46 FL (ref 37–54)
EGFRCR SERPLBLD CKD-EPI 2021: 76.2 ML/MIN/1.73
EOSINOPHIL # BLD AUTO: 0.01 10*3/MM3 (ref 0–0.4)
EOSINOPHIL NFR BLD AUTO: 0.1 % (ref 0.3–6.2)
ERYTHROCYTE [DISTWIDTH] IN BLOOD BY AUTOMATED COUNT: 13.5 % (ref 12.3–15.4)
GLOBULIN UR ELPH-MCNC: 2.4 GM/DL
GLUCOSE SERPL-MCNC: 118 MG/DL (ref 65–99)
HCT VFR BLD AUTO: 47.4 % (ref 37.5–51)
HGB BLD-MCNC: 15.9 G/DL (ref 13–17.7)
IMM GRANULOCYTES # BLD AUTO: 0.06 10*3/MM3 (ref 0–0.05)
IMM GRANULOCYTES NFR BLD AUTO: 0.5 % (ref 0–0.5)
INR PPP: 1.18 (ref 0.9–1.1)
LYMPHOCYTES # BLD AUTO: 1.64 10*3/MM3 (ref 0.7–3.1)
LYMPHOCYTES NFR BLD AUTO: 13.4 % (ref 19.6–45.3)
MCH RBC QN AUTO: 31.4 PG (ref 26.6–33)
MCHC RBC AUTO-ENTMCNC: 33.5 G/DL (ref 31.5–35.7)
MCV RBC AUTO: 93.5 FL (ref 79–97)
MONOCYTES # BLD AUTO: 1.09 10*3/MM3 (ref 0.1–0.9)
MONOCYTES NFR BLD AUTO: 8.9 % (ref 5–12)
NEUTROPHILS NFR BLD AUTO: 76.9 % (ref 42.7–76)
NEUTROPHILS NFR BLD AUTO: 9.43 10*3/MM3 (ref 1.7–7)
NRBC BLD AUTO-RTO: 0 /100 WBC (ref 0–0.2)
PLATELET # BLD AUTO: 242 10*3/MM3 (ref 140–450)
PMV BLD AUTO: 9.3 FL (ref 6–12)
POTASSIUM SERPL-SCNC: 4.3 MMOL/L (ref 3.5–5.2)
PROT SERPL-MCNC: 6.7 G/DL (ref 6–8.5)
PROTHROMBIN TIME: 15 SECONDS (ref 11.7–14.2)
RBC # BLD AUTO: 5.07 10*6/MM3 (ref 4.14–5.8)
SODIUM SERPL-SCNC: 140 MMOL/L (ref 136–145)
TROPONIN T SERPL HS-MCNC: 12 NG/L
WBC NRBC COR # BLD AUTO: 12.26 10*3/MM3 (ref 3.4–10.8)

## 2024-12-07 PROCEDURE — 80053 COMPREHEN METABOLIC PANEL: CPT | Performed by: EMERGENCY MEDICINE

## 2024-12-07 PROCEDURE — 93005 ELECTROCARDIOGRAM TRACING: CPT

## 2024-12-07 PROCEDURE — 80061 LIPID PANEL: CPT | Performed by: NURSE PRACTITIONER

## 2024-12-07 PROCEDURE — 93005 ELECTROCARDIOGRAM TRACING: CPT | Performed by: EMERGENCY MEDICINE

## 2024-12-07 PROCEDURE — 84484 ASSAY OF TROPONIN QUANT: CPT | Performed by: EMERGENCY MEDICINE

## 2024-12-07 PROCEDURE — 99285 EMERGENCY DEPT VISIT HI MDM: CPT

## 2024-12-07 PROCEDURE — 84439 ASSAY OF FREE THYROXINE: CPT | Performed by: NURSE PRACTITIONER

## 2024-12-07 PROCEDURE — 83735 ASSAY OF MAGNESIUM: CPT | Performed by: EMERGENCY MEDICINE

## 2024-12-07 PROCEDURE — 85025 COMPLETE CBC W/AUTO DIFF WBC: CPT | Performed by: EMERGENCY MEDICINE

## 2024-12-07 PROCEDURE — 85610 PROTHROMBIN TIME: CPT | Performed by: EMERGENCY MEDICINE

## 2024-12-07 PROCEDURE — 84481 FREE ASSAY (FT-3): CPT | Performed by: NURSE PRACTITIONER

## 2024-12-07 PROCEDURE — 85730 THROMBOPLASTIN TIME PARTIAL: CPT | Performed by: EMERGENCY MEDICINE

## 2024-12-07 PROCEDURE — 84443 ASSAY THYROID STIM HORMONE: CPT | Performed by: NURSE PRACTITIONER

## 2024-12-07 RX ORDER — SODIUM CHLORIDE 0.9 % (FLUSH) 0.9 %
10 SYRINGE (ML) INJECTION AS NEEDED
Status: DISCONTINUED | OUTPATIENT
Start: 2024-12-07 | End: 2024-12-08 | Stop reason: HOSPADM

## 2024-12-08 VITALS
WEIGHT: 253.09 LBS | BODY MASS INDEX: 32.48 KG/M2 | DIASTOLIC BLOOD PRESSURE: 80 MMHG | SYSTOLIC BLOOD PRESSURE: 126 MMHG | HEIGHT: 74 IN | OXYGEN SATURATION: 95 % | HEART RATE: 72 BPM | RESPIRATION RATE: 17 BRPM | TEMPERATURE: 98.1 F

## 2024-12-08 LAB
CHOLEST SERPL-MCNC: 170 MG/DL (ref 0–200)
DEPRECATED RDW RBC AUTO: 48.8 FL (ref 37–54)
ERYTHROCYTE [DISTWIDTH] IN BLOOD BY AUTOMATED COUNT: 13.6 % (ref 12.3–15.4)
HBA1C MFR BLD: 5.88 % (ref 4.8–5.6)
HCT VFR BLD AUTO: 46.7 % (ref 37.5–51)
HDLC SERPL-MCNC: 62 MG/DL (ref 40–60)
HGB BLD-MCNC: 15.1 G/DL (ref 13–17.7)
LDLC SERPL CALC-MCNC: 84 MG/DL (ref 0–100)
LDLC/HDLC SERPL: 1.3 {RATIO}
MAGNESIUM SERPL-MCNC: 2.3 MG/DL (ref 1.6–2.4)
MCH RBC QN AUTO: 31.2 PG (ref 26.6–33)
MCHC RBC AUTO-ENTMCNC: 32.3 G/DL (ref 31.5–35.7)
MCV RBC AUTO: 96.5 FL (ref 79–97)
PLATELET # BLD AUTO: 161 10*3/MM3 (ref 140–450)
PMV BLD AUTO: 12.1 FL (ref 6–12)
QT INTERVAL: 386 MS
QTC INTERVAL: 438 MS
RBC # BLD AUTO: 4.84 10*6/MM3 (ref 4.14–5.8)
T3FREE SERPL-MCNC: 1.83 PG/ML (ref 2–4.4)
T4 FREE SERPL-MCNC: 1.43 NG/DL (ref 0.93–1.7)
TRIGL SERPL-MCNC: 138 MG/DL (ref 0–150)
TSH SERPL DL<=0.05 MIU/L-ACNC: 4.44 UIU/ML (ref 0.27–4.2)
VLDLC SERPL-MCNC: 24 MG/DL (ref 5–40)
WBC NRBC COR # BLD AUTO: 12.06 10*3/MM3 (ref 3.4–10.8)

## 2024-12-08 PROCEDURE — G0378 HOSPITAL OBSERVATION PER HR: HCPCS

## 2024-12-08 PROCEDURE — 25010000002 ENOXAPARIN PER 10 MG: Performed by: EMERGENCY MEDICINE

## 2024-12-08 PROCEDURE — 25810000003 SODIUM CHLORIDE 0.9 % SOLUTION: Performed by: EMERGENCY MEDICINE

## 2024-12-08 PROCEDURE — 83036 HEMOGLOBIN GLYCOSYLATED A1C: CPT | Performed by: NURSE PRACTITIONER

## 2024-12-08 PROCEDURE — 85027 COMPLETE CBC AUTOMATED: CPT | Performed by: EMERGENCY MEDICINE

## 2024-12-08 RX ORDER — AMOXICILLIN 250 MG
2 CAPSULE ORAL 2 TIMES DAILY PRN
Status: DISCONTINUED | OUTPATIENT
Start: 2024-12-08 | End: 2024-12-08 | Stop reason: HOSPADM

## 2024-12-08 RX ORDER — ENOXAPARIN SODIUM 150 MG/ML
1 INJECTION SUBCUTANEOUS ONCE
Status: COMPLETED | OUTPATIENT
Start: 2024-12-08 | End: 2024-12-08

## 2024-12-08 RX ORDER — SODIUM CHLORIDE 0.9 % (FLUSH) 0.9 %
10 SYRINGE (ML) INJECTION EVERY 12 HOURS SCHEDULED
Status: DISCONTINUED | OUTPATIENT
Start: 2024-12-08 | End: 2024-12-08 | Stop reason: HOSPADM

## 2024-12-08 RX ORDER — SODIUM CHLORIDE 9 MG/ML
40 INJECTION, SOLUTION INTRAVENOUS AS NEEDED
Status: DISCONTINUED | OUTPATIENT
Start: 2024-12-08 | End: 2024-12-08 | Stop reason: HOSPADM

## 2024-12-08 RX ORDER — BISACODYL 5 MG/1
5 TABLET, DELAYED RELEASE ORAL DAILY PRN
Status: DISCONTINUED | OUTPATIENT
Start: 2024-12-08 | End: 2024-12-08 | Stop reason: HOSPADM

## 2024-12-08 RX ORDER — SODIUM CHLORIDE 0.9 % (FLUSH) 0.9 %
10 SYRINGE (ML) INJECTION AS NEEDED
Status: DISCONTINUED | OUTPATIENT
Start: 2024-12-08 | End: 2024-12-08 | Stop reason: HOSPADM

## 2024-12-08 RX ORDER — BISACODYL 10 MG
10 SUPPOSITORY, RECTAL RECTAL DAILY PRN
Status: DISCONTINUED | OUTPATIENT
Start: 2024-12-08 | End: 2024-12-08 | Stop reason: HOSPADM

## 2024-12-08 RX ORDER — POLYETHYLENE GLYCOL 3350 17 G/17G
17 POWDER, FOR SOLUTION ORAL DAILY PRN
Status: DISCONTINUED | OUTPATIENT
Start: 2024-12-08 | End: 2024-12-08 | Stop reason: HOSPADM

## 2024-12-08 RX ADMIN — Medication 10 ML: at 00:12

## 2024-12-08 RX ADMIN — SODIUM CHLORIDE 500 ML: 9 INJECTION, SOLUTION INTRAVENOUS at 00:14

## 2024-12-08 RX ADMIN — ENOXAPARIN SODIUM 120 MG: 150 INJECTION SUBCUTANEOUS at 00:11

## 2024-12-08 NOTE — NURSING NOTE
Patient asked to sign AMA form.   RN discussed care plan with patient and patient not receptive and willing to stay to see cardiologist. Patient wants to go home and rest, follow-up with their cardiologist tomorrow.

## 2024-12-08 NOTE — PLAN OF CARE
Problem: Adult Inpatient Plan of Care  Goal: Plan of Care Review  Outcome: Progressing  Flowsheets (Taken 12/8/2024 0456)  Progress: no change  Outcome Evaluation: Patient NPO to have Cardiology consult this AM. Bed in lowest position, call light within reach.  Plan of Care Reviewed With: patient  Goal: Patient-Specific Goal (Individualized)  Outcome: Progressing  Goal: Absence of Hospital-Acquired Illness or Injury  Outcome: Progressing  Intervention: Identify and Manage Fall Risk  Description: Perform standard risk assessment on admission using a validated tool or comprehensive approach appropriate to the patient; reassess fall risk frequently, with change in status or transfer to another level of care.  Communicate risk to interprofessional healthcare team; ensure fall risk visible cue.  Determine need for increased observation, equipment and environmental modification, as well as use of supportive, nonskid footwear.  Adjust safety measures to individual needs and identified risk factors.  Reinforce the importance of active participation with fall risk prevention, safety, and physical activity with the patient and family.  Perform regular intentional rounding to assess need for position change, pain assessment and personal needs, including assistance with toileting.  Recent Flowsheet Documentation  Taken 12/8/2024 0400 by Gracie Perez, RN  Safety Promotion/Fall Prevention: safety round/check completed  Taken 12/8/2024 0200 by Gracie Perez, RN  Safety Promotion/Fall Prevention: safety round/check completed  Taken 12/8/2024 0100 by Gracie Perez, RN  Safety Promotion/Fall Prevention: safety round/check completed  Intervention: Prevent Infection  Description: Maintain skin and mucous membrane integrity; promote hand, oral and pulmonary hygiene.  Optimize fluid balance, nutrition, sleep and glycemic control to maximize infection resistance.  Identify potential sources of infection early to prevent or  mitigate progression of infection (e.g., wound, lines, devices).  Evaluate ongoing need for invasive devices; remove promptly when no longer indicated.  Review vaccination status.  Recent Flowsheet Documentation  Taken 12/8/2024 0400 by Gracie Perez RN  Infection Prevention:   hand hygiene promoted   rest/sleep promoted   single patient room provided  Taken 12/8/2024 0200 by Gracie Perez RN  Infection Prevention:   hand hygiene promoted   rest/sleep promoted   single patient room provided  Taken 12/8/2024 0100 by Gracie Perez RN  Infection Prevention:   hand hygiene promoted   rest/sleep promoted   single patient room provided  Goal: Optimal Comfort and Wellbeing  Outcome: Progressing  Intervention: Provide Person-Centered Care  Description: Use a family-focused approach to care; encourage support system presence and participation.  Develop trust and rapport by proactively providing information, encouraging questions, addressing concerns and offering reassurance.  Acknowledge emotional response to hospitalization.  Recognize and utilize personal coping strategies and strengths; develop goals via shared decision-making.  Honor spiritual and cultural preferences.  Recent Flowsheet Documentation  Taken 12/8/2024 0100 by Gracie Perez RN  Trust Relationship/Rapport:   care explained   choices provided   emotional support provided   empathic listening provided   questions answered   questions encouraged   reassurance provided   thoughts/feelings acknowledged  Goal: Readiness for Transition of Care  Outcome: Progressing  Intervention: Mutually Develop Transition Plan  Description: Identify available resources for support (e.g., family, friends, community).  Identify and address barriers to ongoing treatment and home management (e.g., environmental, financial).  Provide opportunities to practice self-management skills.  Assess and monitor emotional readiness for transition.  Establish or reconnect  linkage with outpatient providers or community-based services.  Recent Flowsheet Documentation  Taken 12/8/2024 0106 by Garcie Perez RN  Transportation Anticipated: family or friend will provide  Patient/Family Anticipated Services at Transition: none  Patient/Family Anticipates Transition to: home with family  Taken 12/8/2024 0103 by Gracie Perez RN  Equipment Currently Used at Home: none     Problem: Comorbidity Management  Goal: Blood Pressure in Desired Range  Outcome: Progressing  Intervention: Maintain Blood Pressure Management  Description: Evaluate adherence to home antihypertensive regimen (e.g., exercise and activity, diet modification, medication).  Provide scheduled antihypertensive medication; consider administration time and effects (e.g., avoid giving diuretic prior to bedtime).  Monitor response to antihypertensive medication therapy (e.g., blood pressure, electrolyte levels, medication effects).  Minimize risk of orthostatic hypotension; encourage caution with position changes, particularly if elderly.  Recent Flowsheet Documentation  Taken 12/8/2024 0100 by Gracie Perez RN  Medication Review/Management: medications reviewed   Goal Outcome Evaluation:  Plan of Care Reviewed With: patient        Progress: no change  Outcome Evaluation: Patient NPO to have Cardiology consult this AM. Bed in lowest position, call light within reach.

## 2024-12-08 NOTE — ED PROVIDER NOTES
Subjective   History of Present Illness  74-year-old male with history of paroxysmal A-fib presents for A-fib.  Around 930 this morning watch alerted him that he was in A-fib.  Heart rate was quite fast so he took 5 of his metoprolol.  Heart rate come down.  He feels lightheaded like he is going to pass out.  Has been off of his warfarin to get a lumbar epidural.  Previous note from Dr. Feng Mckenzie states that patient had flecainide called in but patient is unaware of this medication and has not been taking it.  Review of Systems  See HPI.  Past Medical History:   Diagnosis Date    Atrial fibrillation     Disease of thyroid gland     Hard of hearing     Hyperlipidemia     Hypertension     PONV (postoperative nausea and vomiting)     Vertigo        Allergies   Allergen Reactions    Codeine Paresthesia     Other reaction(s): Unknown       Past Surgical History:   Procedure Laterality Date    ABDOMINAL SURGERY      APPENDECTOMY      CATARACT EXTRACTION Right     COLON SURGERY      HERNIA REPAIR         Family History   Problem Relation Age of Onset    No Known Problems Mother     No Known Problems Father     Arrhythmia Brother        Social History     Socioeconomic History    Marital status:    Tobacco Use    Smoking status: Former     Current packs/day: 0.00     Types: Cigarettes     Start date: 6/15/1969     Quit date: 6/15/1979     Years since quittin.5     Passive exposure: Never    Smokeless tobacco: Never    Tobacco comments:     While in the [employer]   Vaping Use    Vaping status: Never Used   Substance and Sexual Activity    Alcohol use: Yes     Alcohol/week: 1.0 standard drink of alcohol     Types: 1 Glasses of wine per week     Comment: occasionally    Drug use: No    Sexual activity: Defer           Objective   Physical Exam  No acute distress, clear rotation bilaterally, no tachypnea or increased work of breathing, abdomen soft and nontender, moist oral mucosa, normal rate and irregularly irregular  "rhythm  Procedures           ED Course      /94 (BP Location: Left arm, Patient Position: Lying)   Pulse 74   Temp 98.3 °F (36.8 °C) (Oral)   Resp 12   Ht 188 cm (74\")   Wt 115 kg (253 lb 1.4 oz)   SpO2 95%   BMI 32.49 kg/m²   Labs Reviewed   COMPREHENSIVE METABOLIC PANEL - Abnormal; Notable for the following components:       Result Value    Glucose 118 (*)     All other components within normal limits    Narrative:     GFR Normal >60  Chronic Kidney Disease <60  Kidney Failure <15    The GFR formula is only valid for adults with stable renal function between ages 18 and 70.   PROTIME-INR - Abnormal; Notable for the following components:    Protime 15.0 (*)     INR 1.18 (*)     All other components within normal limits   CBC WITH AUTO DIFFERENTIAL - Abnormal; Notable for the following components:    WBC 12.26 (*)     Neutrophil % 76.9 (*)     Lymphocyte % 13.4 (*)     Eosinophil % 0.1 (*)     Neutrophils, Absolute 9.43 (*)     Monocytes, Absolute 1.09 (*)     Immature Grans, Absolute 0.06 (*)     All other components within normal limits   APTT - Normal   SINGLE HS TROPONIN T - Normal    Narrative:     High Sensitive Troponin T Reference Range:  <14.0 ng/L- Negative Female for AMI  <22.0 ng/L- Negative Male for AMI  >=14 - Abnormal Female indicating possible myocardial injury.  >=22 - Abnormal Male indicating possible myocardial injury.   Clinicians would have to utilize clinical acumen, EKG, Troponin, and serial changes to determine if it is an Acute Myocardial Infarction or myocardial injury due to an underlying chronic condition.        CBC (NO DIFF)   BASIC METABOLIC PANEL   MAGNESIUM   CBC AND DIFFERENTIAL    Narrative:     The following orders were created for panel order CBC & Differential.  Procedure                               Abnormality         Status                     ---------                               -----------         ------                     CBC Auto Differential[[id number]]  "       Abnormal            Final result                 Please view results for these tests on the individual orders.     No orders to display                                                      Medical Decision Making  EKG interpretation: 9:20 PM, rate 77, atrial fibrillation, normal axis, normal QTc, no acute ischemic changes.    Patient with reassuring exam.  Very concerned that he has A-fib that usually last for a short period of time.  Concerned about cardioverting here as patient not currently anticoagulated and discussed concerns with patient.  Discussed observation versus discharge.  Considering he is symptomatic he is concerned regarding being discharged home.  Will place in observation for cardiology consult.    Final diagnoses:   Atrial fibrillation, unspecified type   Near syncope       ED Disposition  ED Disposition       ED Disposition   Decision to Admit    Condition   --    Comment   --               No follow-up provider specified.       Medication List      No changes were made to your prescriptions during this visit.            Maco Espinosa MD  12/08/24 0146

## 2024-12-08 NOTE — CONSULTS
"Cardiology consulted for \"symptomatic A-fib\".  Attempted to see patient, RN informs me patient has signed AMA papers and is on his way out.  "

## 2024-12-09 ENCOUNTER — TELEPHONE (OUTPATIENT)
Dept: CARDIOLOGY | Facility: CLINIC | Age: 74
End: 2024-12-09
Payer: MEDICARE

## 2024-12-09 NOTE — CASE MANAGEMENT/SOCIAL WORK
Case Management Discharge Note      Final Note: Routine home         Selected Continued Care - Discharged on 12/8/2024 Admission date: 12/7/2024 - Discharge disposition: Home or Self Care       Transportation Services  Private: Car    Final Discharge Disposition Code: 01 - home or self-care

## 2024-12-10 ENCOUNTER — OFFICE VISIT (OUTPATIENT)
Dept: CARDIOLOGY | Facility: CLINIC | Age: 74
End: 2024-12-10
Payer: MEDICARE

## 2024-12-10 ENCOUNTER — ANTICOAGULATION VISIT (OUTPATIENT)
Dept: CARDIOLOGY | Facility: CLINIC | Age: 74
End: 2024-12-10
Payer: MEDICARE

## 2024-12-10 VITALS
BODY MASS INDEX: 31.83 KG/M2 | HEART RATE: 65 BPM | OXYGEN SATURATION: 98 % | DIASTOLIC BLOOD PRESSURE: 76 MMHG | HEIGHT: 74 IN | WEIGHT: 248 LBS | SYSTOLIC BLOOD PRESSURE: 135 MMHG

## 2024-12-10 DIAGNOSIS — I10 ESSENTIAL HYPERTENSION: Chronic | ICD-10-CM

## 2024-12-10 DIAGNOSIS — Z79.01 LONG TERM (CURRENT) USE OF ANTICOAGULANTS: ICD-10-CM

## 2024-12-10 DIAGNOSIS — I48.92 ATRIAL FLUTTER WITH RAPID VENTRICULAR RESPONSE: ICD-10-CM

## 2024-12-10 DIAGNOSIS — I48.0 PAROXYSMAL ATRIAL FIBRILLATION: Primary | ICD-10-CM

## 2024-12-10 LAB — INR PPP: 1.9 (ref 2–3)

## 2024-12-10 PROCEDURE — 85610 PROTHROMBIN TIME: CPT | Performed by: INTERNAL MEDICINE

## 2024-12-10 PROCEDURE — 36416 COLLJ CAPILLARY BLOOD SPEC: CPT | Performed by: INTERNAL MEDICINE

## 2024-12-10 RX ORDER — FLECAINIDE ACETATE 100 MG/1
200 TABLET ORAL ONCE
Qty: 20 TABLET | Refills: 0 | Status: SHIPPED | OUTPATIENT
Start: 2024-12-10 | End: 2024-12-10

## 2024-12-10 NOTE — PROGRESS NOTES
Progress note      Name: Filipe Altamirano ADMIT: (Not on file)   : 1950  PCP: Frantz Saleem MD    MRN: [id number] LOS: 0 days   AGE/SEX: 74 y.o. male  ROOM: Room/bed info not found     Chief Complaint   Patient presents with    Follow-up     F/u recent ER visit       Subjective       History of present illness  Filipe Altamirano is a 74-year-old male patient who has paroxysmal atrial fibrillation initially diagnosed in , here today for follow-up.  During previous visits, flecainide was called in but patient hesitated due to potential side effects.  He had another episode of A-fib and he went to the hospital but self converted to sinus rhythm.    Past Medical History:   Diagnosis Date    Atrial fibrillation     Disease of thyroid gland     Hard of hearing     Hyperlipidemia     Hypertension     PONV (postoperative nausea and vomiting)     Vertigo      Past Surgical History:   Procedure Laterality Date    ABDOMINAL SURGERY      APPENDECTOMY      CATARACT EXTRACTION Right     COLON SURGERY      HERNIA REPAIR       Family History   Problem Relation Age of Onset    No Known Problems Mother     No Known Problems Father     Arrhythmia Brother      Social History     Tobacco Use    Smoking status: Former     Current packs/day: 0.00     Types: Cigarettes     Start date: 6/15/1969     Quit date: 6/15/1979     Years since quittin.5     Passive exposure: Never    Smokeless tobacco: Never    Tobacco comments:     While in the [employer]   Vaping Use    Vaping status: Never Used   Substance Use Topics    Alcohol use: Yes     Alcohol/week: 1.0 standard drink of alcohol     Types: 1 Glasses of wine per week     Comment: occasionally    Drug use: No       Current Outpatient Medications:     Cholecalciferol (Vitamin D3) 50 MCG (2000) capsule, Take 1 capsule by mouth Daily., Disp: , Rfl:     levothyroxine (SYNTHROID, LEVOTHROID) 100 MCG tablet, Take 1 tablet by mouth Daily., Disp: , Rfl:     losartan (COZAAR) 25 MG tablet,  Take 1 tablet by mouth Daily., Disp: 90 tablet, Rfl: 2    methylPREDNISolone (MEDROL) 4 MG dose pack, Take as directed on package instructions., Disp: 21 tablet, Rfl: 0    metoprolol tartrate (LOPRESSOR) 25 MG tablet, TAKE ONE TABLET BY MOUTH THREE TIMES A DAY, Disp: 270 tablet, Rfl: 1    rosuvastatin (CRESTOR) 5 MG tablet, Take 1 tablet by mouth Daily., Disp: , Rfl:     warfarin (COUMADIN) 5 MG tablet, TAKE 1 AND 1/2 TABLET BY MOUTH DAILY EXCEPT TAKE ONE TABLET BY MOUTH ON MONDAYS, WEDNESDAYS, AND FRIDAYS OR AS DIRECTED, Disp: 115 tablet, Rfl: 0    flecainide (TAMBOCOR) 100 MG tablet, Take 2 tablets by mouth 1 (One) Time for 1 dose., Disp: 20 tablet, Rfl: 0  Allergies:  Codeine      Physical Exam  VITALS REVIEWED    General:      well developed, in no acute distress.    Head:      normocephalic and atraumatic.    Eyes:      PERRL/EOM intact, conjunctiva and sclera clear with out nystagmus.    Neck:      no masses, thyromegaly,  trachea central with normal respiratory effort and PMI displaced laterally  Lungs:      Clear to auscultation bilaterally  Heart:       Regular rate and rhythm  Msk:      no deformity or scoliosis noted of thoracic or lumbar spine.    Pulses:      pulses normal in all 4 extremities.    Extremities:       No lower extremity edema  Neurologic:      no focal deficits.   alert oriented x3  Skin:      intact without lesions or rashes.    Psych:      alert and cooperative; normal mood and affect; normal attention span and concentration.      Result Review :               Pertinent cardiac workup    Echo on 3/17/2020 ejection fraction 55% mild to moderate aortic regurgitation  Telemetry strips from 3/5/2020 (first trip) atrial flutter  EKG 12/7/2024 atrial fibrillation      Procedures        Assessment and Plan      Filipe Altamirano is a 74-year-old male patient who has paroxysmal atrial fibrillation since 2020, here today for follow-up.  Patient had another episode of A-fib a few days ago and went  to the ER but he left the ER and eventually self converted to sinus rhythm.  He is now agreeable to take flecainide for now he would like to take it on as-needed basis.  I told him to take 200 mg of flecainide when he goes into A-fib as part of pill in the pocket strategy.  But if he continues to have recurrences then the next option is either to be on antiarrhythmic on a daily basis or perform ablation.  We will discuss with his next appointment.  He is on Coumadin for stroke prevention.    Diagnoses and all orders for this visit:    1. Paroxysmal atrial fibrillation (Primary)    2. Atrial flutter with rapid ventricular response    3. Long term (current) use of anticoagulants    4. Essential hypertension    Other orders  -     flecainide (TAMBOCOR) 100 MG tablet; Take 2 tablets by mouth 1 (One) Time for 1 dose.  Dispense: 20 tablet; Refill: 0           No follow-ups on file.  Patient was given instructions and counseling regarding his condition or for health maintenance advice. Please see specific information pulled into the AVS if appropriate.         Electronically signed by Robert Brown MD, 12/10/24, 10:01 AM EST.

## 2024-12-18 DIAGNOSIS — I48.92 ATRIAL FLUTTER WITH RAPID VENTRICULAR RESPONSE: Primary | ICD-10-CM

## 2024-12-18 RX ORDER — FLECAINIDE ACETATE 100 MG/1
200 TABLET ORAL DAILY
Qty: 20 TABLET | Refills: 6 | Status: SHIPPED | OUTPATIENT
Start: 2024-12-18

## 2024-12-18 NOTE — TELEPHONE ENCOUNTER
Rx Refill Note  Requested Prescriptions     Signed Prescriptions Disp Refills    flecainide (TAMBOCOR) 100 MG tablet 20 tablet 6     Sig: TAKE 2 TABLETS BY MOUTH DAILY     Authorizing Provider: DOM CHATMAN     Ordering User: MARÍA HERNANDEZ      Last office visit with prescribing clinician: 12/10/2024   Last telemedicine visit with prescribing clinician: Visit date not found   Next office visit with prescribing clinician: 5/13/2025                         Would you like a call back once the refill request has been completed: [] Yes [] No    If the office needs to give you a call back, can they leave a voicemail: [] Yes [] No    María Hernandez MA  12/18/24, 10:25 EST   Infliximab Counseling:  I discussed with the patient the risks of infliximab including but not limited to myelosuppression, immunosuppression, autoimmune hepatitis, demyelinating diseases, lymphoma, and serious infections.  The patient understands that monitoring is required including a PPD at baseline and must alert us or the primary physician if symptoms of infection or other concerning signs are noted.

## 2025-01-03 ENCOUNTER — TELEPHONE (OUTPATIENT)
Dept: CARDIOLOGY | Facility: CLINIC | Age: 75
End: 2025-01-03
Payer: MEDICARE

## 2025-01-03 NOTE — TELEPHONE ENCOUNTER
"  Caller: Filipe Altamirano \"Arslan\"    Relationship: Self    Best call back number: [phone]    What is the best time to reach you: ANY    What was the call regarding: PATIENT WANTS CALL BACK TO DISCUSS POSSIBLE ABLATION PROCEDURE PLEASE ADVISE.      "

## 2025-01-03 NOTE — TELEPHONE ENCOUNTER
Called pt back advised that per LOV Dr Brown will discuss possible ablation at next visit. Pt wanted a sooner rappt we canceled his 05/13/25 appt and got him on to see Dr Brown on 01/22/25 same day as INR. Pt verbalized understanding

## 2025-01-22 ENCOUNTER — PREP FOR SURGERY (OUTPATIENT)
Dept: OTHER | Facility: HOSPITAL | Age: 75
End: 2025-01-22
Payer: MEDICARE

## 2025-01-22 ENCOUNTER — OFFICE VISIT (OUTPATIENT)
Dept: CARDIOLOGY | Facility: CLINIC | Age: 75
End: 2025-01-22
Payer: MEDICARE

## 2025-01-22 ENCOUNTER — ANTICOAGULATION VISIT (OUTPATIENT)
Dept: CARDIOLOGY | Facility: CLINIC | Age: 75
End: 2025-01-22
Payer: MEDICARE

## 2025-01-22 VITALS
OXYGEN SATURATION: 94 % | WEIGHT: 254 LBS | HEART RATE: 66 BPM | DIASTOLIC BLOOD PRESSURE: 69 MMHG | HEIGHT: 74 IN | BODY MASS INDEX: 32.6 KG/M2 | SYSTOLIC BLOOD PRESSURE: 140 MMHG

## 2025-01-22 VITALS
HEART RATE: 69 BPM | BODY MASS INDEX: 32.61 KG/M2 | DIASTOLIC BLOOD PRESSURE: 57 MMHG | WEIGHT: 254 LBS | SYSTOLIC BLOOD PRESSURE: 151 MMHG

## 2025-01-22 DIAGNOSIS — Z79.01 LONG TERM (CURRENT) USE OF ANTICOAGULANTS: ICD-10-CM

## 2025-01-22 DIAGNOSIS — I10 ESSENTIAL HYPERTENSION: Chronic | ICD-10-CM

## 2025-01-22 DIAGNOSIS — I48.0 PAROXYSMAL ATRIAL FIBRILLATION: ICD-10-CM

## 2025-01-22 DIAGNOSIS — I48.0 PAROXYSMAL ATRIAL FIBRILLATION: Primary | ICD-10-CM

## 2025-01-22 DIAGNOSIS — I48.92 ATRIAL FLUTTER WITH RAPID VENTRICULAR RESPONSE: Primary | ICD-10-CM

## 2025-01-22 LAB — INR PPP: 2.2 (ref 0.9–1.1)

## 2025-01-22 PROCEDURE — 1159F MED LIST DOCD IN RCRD: CPT | Performed by: INTERNAL MEDICINE

## 2025-01-22 PROCEDURE — 3077F SYST BP >= 140 MM HG: CPT | Performed by: INTERNAL MEDICINE

## 2025-01-22 PROCEDURE — 3078F DIAST BP <80 MM HG: CPT | Performed by: INTERNAL MEDICINE

## 2025-01-22 PROCEDURE — 85610 PROTHROMBIN TIME: CPT | Performed by: INTERNAL MEDICINE

## 2025-01-22 PROCEDURE — 99213 OFFICE O/P EST LOW 20 MIN: CPT | Performed by: INTERNAL MEDICINE

## 2025-01-22 PROCEDURE — 1160F RVW MEDS BY RX/DR IN RCRD: CPT | Performed by: INTERNAL MEDICINE

## 2025-01-22 PROCEDURE — 36416 COLLJ CAPILLARY BLOOD SPEC: CPT | Performed by: INTERNAL MEDICINE

## 2025-01-22 NOTE — PROGRESS NOTES
Progress note      Name: Filipe Altamirano ADMIT: (Not on file)   : 1950  PCP: Frantz Saleem MD    MRN: [id number] LOS: 0 days   AGE/SEX: 74 y.o. male  ROOM: Room/bed info not found     Chief Complaint   Patient presents with    Follow-up     Discuss possible ablation       Subjective       History of present illness  Filipe Swan a 74-year-old male patient who has paroxysmal atrial fibrillation initially diagnosed in , here today for follow-up. During previous visits, flecainide was called in but patient hesitated due to potential side effects, but then he did take 200 mg during an episode which terminated it.  Patient is here today to discuss about ablation.    Past Medical History:   Diagnosis Date    Atrial fibrillation     Disease of thyroid gland     Hard of hearing     Hyperlipidemia     Hypertension     PONV (postoperative nausea and vomiting)     Vertigo      Past Surgical History:   Procedure Laterality Date    ABDOMINAL SURGERY      APPENDECTOMY      CATARACT EXTRACTION Right     COLON SURGERY      HERNIA REPAIR       Family History   Problem Relation Age of Onset    No Known Problems Mother     No Known Problems Father     Arrhythmia Brother      Social History     Tobacco Use    Smoking status: Former     Current packs/day: 0.00     Types: Cigarettes     Start date: 6/15/1969     Quit date: 6/15/1979     Years since quittin.6     Passive exposure: Never    Smokeless tobacco: Never    Tobacco comments:     While in the [employer]   Vaping Use    Vaping status: Never Used   Substance Use Topics    Alcohol use: Yes     Alcohol/week: 1.0 standard drink of alcohol     Types: 1 Glasses of wine per week     Comment: occasionally    Drug use: No       Current Outpatient Medications:     Cholecalciferol (Vitamin D3) 50 MCG (2000) capsule, Take 1 capsule by mouth Daily., Disp: , Rfl:     flecainide (TAMBOCOR) 100 MG tablet, TAKE 2 TABLETS BY MOUTH DAILY, Disp: 20 tablet, Rfl: 6    levothyroxine  (SYNTHROID, LEVOTHROID) 100 MCG tablet, Take 1 tablet by mouth Daily., Disp: , Rfl:     losartan (COZAAR) 25 MG tablet, Take 1 tablet by mouth Daily., Disp: 90 tablet, Rfl: 2    methylPREDNISolone (MEDROL) 4 MG dose pack, Take as directed on package instructions., Disp: 21 tablet, Rfl: 0    metoprolol tartrate (LOPRESSOR) 25 MG tablet, TAKE ONE TABLET BY MOUTH THREE TIMES A DAY, Disp: 270 tablet, Rfl: 1    rosuvastatin (CRESTOR) 5 MG tablet, Take 1 tablet by mouth Daily., Disp: , Rfl:     warfarin (COUMADIN) 5 MG tablet, TAKE 1 AND 1/2 TABLET BY MOUTH DAILY EXCEPT TAKE ONE TABLET BY MOUTH ON MONDAYS, WEDNESDAYS, AND FRIDAYS OR AS DIRECTED, Disp: 115 tablet, Rfl: 0  Allergies:  Codeine      Physical Exam  VITALS REVIEWED    General:      well developed, in no acute distress.    Head:      normocephalic and atraumatic.    Eyes:      PERRL/EOM intact, conjunctiva and sclera clear with out nystagmus.    Neck:      no masses, thyromegaly,  trachea central with normal respiratory effort and PMI displaced laterally  Lungs:      Clear to auscultation bilaterally  Heart:       Regular rate and rhythm  Msk:      no deformity or scoliosis noted of thoracic or lumbar spine.    Pulses:      pulses normal in all 4 extremities.    Extremities:       No lower extremity edema  Neurologic:      no focal deficits.   alert oriented x3  Skin:      intact without lesions or rashes.    Psych:      alert and cooperative; normal mood and affect; normal attention span and concentration.      Result Review :               Pertinent cardiac workup    Echo on 3/17/2020 ejection fraction 55% mild to moderate aortic regurgitation  Telemetry strips from 3/5/2020 (first trip) atrial flutter  EKG 12/7/2024 atrial fibrillation      Procedures        Assessment and Plan      Filipe Altamirano is a 74-year-old male patient who has paroxysmal atrial fibrillation since 2020.  The patient has multiple recurrences of arrhythmia.  He was recently given  flecainide to use as needed and he took 200 mg during an episode which terminated the arrhythmia.  Due to multiple recurrences however patient would like to have an ablation done, risk indications and benefits of A-fib and in his case atrial flutter ablation were discussed at length and he is agreeable.  Continue same medications until then.    Diagnoses and all orders for this visit:    1. Atrial flutter with rapid ventricular response (Primary)    2. Paroxysmal atrial fibrillation    3. Essential hypertension           No follow-ups on file.  Patient was given instructions and counseling regarding his condition or for health maintenance advice. Please see specific information pulled into the AVS if appropriate.       Electronically signed by Robert Brown MD, 01/22/25, 4:45 PM EST.

## 2025-01-25 DIAGNOSIS — I48.0 PAROXYSMAL ATRIAL FIBRILLATION: ICD-10-CM

## 2025-01-25 DIAGNOSIS — Z79.01 LONG TERM (CURRENT) USE OF ANTICOAGULANTS: ICD-10-CM

## 2025-01-27 RX ORDER — WARFARIN SODIUM 5 MG/1
TABLET ORAL
Qty: 105 TABLET | Refills: 0 | Status: SHIPPED | OUTPATIENT
Start: 2025-01-27

## 2025-01-27 NOTE — TELEPHONE ENCOUNTER
Rx Refill Note  Requested Prescriptions     Pending Prescriptions Disp Refills    warfarin (COUMADIN) 5 MG tablet [Pharmacy Med Name: WARFARIN SODIUM 5 MG TABLET] 105 tablet 0     Sig: Take 1 tab, by mouth, daily except 1 1/2 tabs on Tues, Thurs and Sat or as directed.    Last INR 1/22/25  Last office visit with prescribing clinician: 1/22/2025   Last telemedicine visit with prescribing clinician: Visit date not found   Next office visit with prescribing clinician: Visit date not found                         Would you like a call back once the refill request has been completed: [] Yes [] No    If the office needs to give you a call back, can they leave a voicemail: [] Yes [] No    Lacy Salinas RN  01/27/25, 16:58 EST

## 2025-02-18 ENCOUNTER — TELEPHONE (OUTPATIENT)
Dept: CARDIOLOGY | Facility: CLINIC | Age: 75
End: 2025-02-18
Payer: MEDICARE

## 2025-02-20 ENCOUNTER — TELEPHONE (OUTPATIENT)
Dept: CARDIOLOGY | Facility: CLINIC | Age: 75
End: 2025-02-20
Payer: MEDICARE

## 2025-02-20 ENCOUNTER — ANTICOAGULATION VISIT (OUTPATIENT)
Dept: CARDIOLOGY | Facility: CLINIC | Age: 75
End: 2025-02-20
Payer: MEDICARE

## 2025-02-20 ENCOUNTER — LAB (OUTPATIENT)
Dept: LAB | Facility: HOSPITAL | Age: 75
End: 2025-02-20
Payer: MEDICARE

## 2025-02-20 DIAGNOSIS — Z79.01 LONG TERM (CURRENT) USE OF ANTICOAGULANTS: Primary | ICD-10-CM

## 2025-02-20 DIAGNOSIS — I48.0 PAROXYSMAL ATRIAL FIBRILLATION: ICD-10-CM

## 2025-02-20 LAB
ALBUMIN SERPL-MCNC: 4.5 G/DL (ref 3.5–5.2)
ALBUMIN/GLOB SERPL: 1.7 G/DL
ALP SERPL-CCNC: 82 U/L (ref 39–117)
ALT SERPL W P-5'-P-CCNC: 29 U/L (ref 1–41)
ANION GAP SERPL CALCULATED.3IONS-SCNC: 8.7 MMOL/L (ref 5–15)
AST SERPL-CCNC: 27 U/L (ref 1–40)
BASOPHILS # BLD AUTO: 0.09 10*3/MM3 (ref 0–0.2)
BASOPHILS NFR BLD AUTO: 1.2 % (ref 0–1.5)
BILIRUB SERPL-MCNC: 0.6 MG/DL (ref 0–1.2)
BUN SERPL-MCNC: 13 MG/DL (ref 8–23)
BUN/CREAT SERPL: 12.7 (ref 7–25)
CALCIUM SPEC-SCNC: 9.2 MG/DL (ref 8.6–10.5)
CHLORIDE SERPL-SCNC: 106 MMOL/L (ref 98–107)
CO2 SERPL-SCNC: 29.3 MMOL/L (ref 22–29)
CREAT SERPL-MCNC: 1.02 MG/DL (ref 0.76–1.27)
DEPRECATED RDW RBC AUTO: 46.5 FL (ref 37–54)
EGFRCR SERPLBLD CKD-EPI 2021: 75.7 ML/MIN/1.73
EOSINOPHIL # BLD AUTO: 0.28 10*3/MM3 (ref 0–0.4)
EOSINOPHIL NFR BLD AUTO: 3.7 % (ref 0.3–6.2)
ERYTHROCYTE [DISTWIDTH] IN BLOOD BY AUTOMATED COUNT: 13.7 % (ref 12.3–15.4)
GLOBULIN UR ELPH-MCNC: 2.6 GM/DL
GLUCOSE SERPL-MCNC: 98 MG/DL (ref 65–99)
HCT VFR BLD AUTO: 44.9 % (ref 37.5–51)
HGB BLD-MCNC: 14.7 G/DL (ref 13–17.7)
IMM GRANULOCYTES # BLD AUTO: 0.02 10*3/MM3 (ref 0–0.05)
IMM GRANULOCYTES NFR BLD AUTO: 0.3 % (ref 0–0.5)
INR PPP: 2.99 (ref 0.9–1.1)
LYMPHOCYTES # BLD AUTO: 2.05 10*3/MM3 (ref 0.7–3.1)
LYMPHOCYTES NFR BLD AUTO: 27.4 % (ref 19.6–45.3)
MAGNESIUM SERPL-MCNC: 2.1 MG/DL (ref 1.6–2.4)
MCH RBC QN AUTO: 30.6 PG (ref 26.6–33)
MCHC RBC AUTO-ENTMCNC: 32.7 G/DL (ref 31.5–35.7)
MCV RBC AUTO: 93.3 FL (ref 79–97)
MONOCYTES # BLD AUTO: 0.69 10*3/MM3 (ref 0.1–0.9)
MONOCYTES NFR BLD AUTO: 9.2 % (ref 5–12)
NEUTROPHILS NFR BLD AUTO: 4.34 10*3/MM3 (ref 1.7–7)
NEUTROPHILS NFR BLD AUTO: 58.2 % (ref 42.7–76)
NRBC BLD AUTO-RTO: 0 /100 WBC (ref 0–0.2)
PLATELET # BLD AUTO: 200 10*3/MM3 (ref 140–450)
PMV BLD AUTO: 9.8 FL (ref 6–12)
POTASSIUM SERPL-SCNC: 4 MMOL/L (ref 3.5–5.2)
PROT SERPL-MCNC: 7.1 G/DL (ref 6–8.5)
PROTHROMBIN TIME: 31.5 SECONDS (ref 11.7–14.2)
RBC # BLD AUTO: 4.81 10*6/MM3 (ref 4.14–5.8)
SODIUM SERPL-SCNC: 144 MMOL/L (ref 136–145)
WBC NRBC COR # BLD AUTO: 7.47 10*3/MM3 (ref 3.4–10.8)

## 2025-02-20 PROCEDURE — 85025 COMPLETE CBC W/AUTO DIFF WBC: CPT

## 2025-02-20 PROCEDURE — 36415 COLL VENOUS BLD VENIPUNCTURE: CPT

## 2025-02-20 PROCEDURE — 83735 ASSAY OF MAGNESIUM: CPT

## 2025-02-20 PROCEDURE — 80053 COMPREHEN METABOLIC PANEL: CPT

## 2025-02-20 PROCEDURE — 85610 PROTHROMBIN TIME: CPT

## 2025-02-20 NOTE — PROGRESS NOTES
Spoke to patient, CPM and recheck as scheduled 3/3/25. He is getting ready for dental procedure on 3/3/25. He was told to hold medications on 2/24 AM day of procedure.

## 2025-02-23 ENCOUNTER — ANESTHESIA EVENT (OUTPATIENT)
Dept: CARDIOLOGY | Facility: HOSPITAL | Age: 75
End: 2025-02-23
Payer: MEDICARE

## 2025-02-23 RX ORDER — SODIUM CHLORIDE 9 MG/ML
9 INJECTION, SOLUTION INTRAVENOUS CONTINUOUS PRN
Status: CANCELLED | OUTPATIENT
Start: 2025-02-23 | End: 2025-02-24

## 2025-02-23 RX ORDER — SODIUM CHLORIDE 0.9 % (FLUSH) 0.9 %
10 SYRINGE (ML) INJECTION AS NEEDED
Status: CANCELLED | OUTPATIENT
Start: 2025-02-23

## 2025-02-23 RX ORDER — SODIUM CHLORIDE 0.9 % (FLUSH) 0.9 %
10 SYRINGE (ML) INJECTION EVERY 12 HOURS SCHEDULED
Status: CANCELLED | OUTPATIENT
Start: 2025-02-23

## 2025-02-24 ENCOUNTER — HOSPITAL ENCOUNTER (OUTPATIENT)
Facility: HOSPITAL | Age: 75
Setting detail: HOSPITAL OUTPATIENT SURGERY
Discharge: HOME | End: 2025-02-24
Attending: INTERNAL MEDICINE | Admitting: INTERNAL MEDICINE
Payer: MEDICARE

## 2025-02-24 ENCOUNTER — ANESTHESIA (OUTPATIENT)
Dept: CARDIOLOGY | Facility: HOSPITAL | Age: 75
End: 2025-02-24
Payer: MEDICARE

## 2025-02-24 VITALS
DIASTOLIC BLOOD PRESSURE: 61 MMHG | WEIGHT: 261.91 LBS | BODY MASS INDEX: 33.61 KG/M2 | OXYGEN SATURATION: 92 % | HEIGHT: 74 IN | SYSTOLIC BLOOD PRESSURE: 118 MMHG | RESPIRATION RATE: 19 BRPM | HEART RATE: 91 BPM | TEMPERATURE: 97.8 F

## 2025-02-24 DIAGNOSIS — I48.0 PAROXYSMAL ATRIAL FIBRILLATION: ICD-10-CM

## 2025-02-24 LAB
ACT BLD: 279 SECONDS (ref 89–137)
ACT BLD: 285 SECONDS (ref 89–137)
ACT BLD: 291 SECONDS (ref 89–137)

## 2025-02-24 PROCEDURE — 25010000002 GLYCOPYRROLATE 1 MG/5ML SOLUTION: Performed by: NURSE ANESTHETIST, CERTIFIED REGISTERED

## 2025-02-24 PROCEDURE — 25810000003 SODIUM CHLORIDE 0.9 % SOLUTION 250 ML FLEX CONT: Performed by: NURSE ANESTHETIST, CERTIFIED REGISTERED

## 2025-02-24 PROCEDURE — 93656 COMPRE EP EVAL ABLTJ ATR FIB: CPT | Performed by: INTERNAL MEDICINE

## 2025-02-24 PROCEDURE — 25010000002 PHENYLEPHRINE 10 MG/ML SOLUTION 5 ML VIAL: Performed by: NURSE ANESTHETIST, CERTIFIED REGISTERED

## 2025-02-24 PROCEDURE — C1894 INTRO/SHEATH, NON-LASER: HCPCS | Performed by: INTERNAL MEDICINE

## 2025-02-24 PROCEDURE — 25010000002 DEXAMETHASONE PER 1 MG: Performed by: NURSE ANESTHETIST, CERTIFIED REGISTERED

## 2025-02-24 PROCEDURE — 25010000002 SUGAMMADEX 200 MG/2ML SOLUTION: Performed by: NURSE ANESTHETIST, CERTIFIED REGISTERED

## 2025-02-24 PROCEDURE — 93655 ICAR CATH ABLTJ DSCRT ARRHYT: CPT | Performed by: INTERNAL MEDICINE

## 2025-02-24 PROCEDURE — 25010000002 PROPOFOL 10 MG/ML EMULSION: Performed by: NURSE ANESTHETIST, CERTIFIED REGISTERED

## 2025-02-24 PROCEDURE — C1730 CATH, EP, 19 OR FEW ELECT: HCPCS | Performed by: INTERNAL MEDICINE

## 2025-02-24 PROCEDURE — C1733 CATH, EP, OTHR THAN COOL-TIP: HCPCS | Performed by: INTERNAL MEDICINE

## 2025-02-24 PROCEDURE — 25810000003 SODIUM CHLORIDE 0.9 % SOLUTION: Performed by: NURSE ANESTHETIST, CERTIFIED REGISTERED

## 2025-02-24 PROCEDURE — C1766 INTRO/SHEATH,STRBLE,NON-PEEL: HCPCS | Performed by: INTERNAL MEDICINE

## 2025-02-24 PROCEDURE — C1732 CATH, EP, DIAG/ABL, 3D/VECT: HCPCS | Performed by: INTERNAL MEDICINE

## 2025-02-24 PROCEDURE — 25010000002 PROTAMINE SULFATE PER 10 MG: Performed by: NURSE ANESTHETIST, CERTIFIED REGISTERED

## 2025-02-24 PROCEDURE — C1769 GUIDE WIRE: HCPCS | Performed by: INTERNAL MEDICINE

## 2025-02-24 PROCEDURE — 25010000002 HEPARIN (PORCINE) PER 1000 UNITS: Performed by: NURSE ANESTHETIST, CERTIFIED REGISTERED

## 2025-02-24 PROCEDURE — C1759 CATH, INTRA ECHOCARDIOGRAPHY: HCPCS | Performed by: INTERNAL MEDICINE

## 2025-02-24 PROCEDURE — 25010000002 ONDANSETRON PER 1 MG: Performed by: NURSE ANESTHETIST, CERTIFIED REGISTERED

## 2025-02-24 PROCEDURE — 85347 COAGULATION TIME ACTIVATED: CPT

## 2025-02-24 PROCEDURE — 25010000002 HEPARIN (PORCINE) 25000-0.45 UT/250ML-% SOLUTION: Performed by: NURSE ANESTHETIST, CERTIFIED REGISTERED

## 2025-02-24 PROCEDURE — S0260 H&P FOR SURGERY: HCPCS | Performed by: INTERNAL MEDICINE

## 2025-02-24 RX ORDER — DIPHENHYDRAMINE HYDROCHLORIDE 50 MG/ML
12.5 INJECTION INTRAMUSCULAR; INTRAVENOUS
Status: DISCONTINUED | OUTPATIENT
Start: 2025-02-24 | End: 2025-02-24 | Stop reason: HOSPADM

## 2025-02-24 RX ORDER — PROTAMINE SULFATE 10 MG/ML
INJECTION, SOLUTION INTRAVENOUS AS NEEDED
Status: DISCONTINUED | OUTPATIENT
Start: 2025-02-24 | End: 2025-02-24 | Stop reason: SURG

## 2025-02-24 RX ORDER — FENTANYL CITRATE 50 UG/ML
50 INJECTION, SOLUTION INTRAMUSCULAR; INTRAVENOUS
Status: DISCONTINUED | OUTPATIENT
Start: 2025-02-24 | End: 2025-02-24 | Stop reason: HOSPADM

## 2025-02-24 RX ORDER — EPHEDRINE SULFATE 5 MG/ML
5 INJECTION INTRAVENOUS ONCE AS NEEDED
Status: DISCONTINUED | OUTPATIENT
Start: 2025-02-24 | End: 2025-02-24 | Stop reason: HOSPADM

## 2025-02-24 RX ORDER — HEPARIN SODIUM 10000 [USP'U]/100ML
INJECTION, SOLUTION INTRAVENOUS CONTINUOUS PRN
Status: DISCONTINUED | OUTPATIENT
Start: 2025-02-24 | End: 2025-02-24 | Stop reason: SURG

## 2025-02-24 RX ORDER — ONDANSETRON 2 MG/ML
4 INJECTION INTRAMUSCULAR; INTRAVENOUS ONCE AS NEEDED
Status: DISCONTINUED | OUTPATIENT
Start: 2025-02-24 | End: 2025-02-24 | Stop reason: HOSPADM

## 2025-02-24 RX ORDER — OXYCODONE HYDROCHLORIDE 5 MG/1
5 TABLET ORAL ONCE AS NEEDED
Status: DISCONTINUED | OUTPATIENT
Start: 2025-02-24 | End: 2025-02-24 | Stop reason: HOSPADM

## 2025-02-24 RX ORDER — NITROGLYCERIN 0.4 MG/1
0.4 TABLET SUBLINGUAL
Status: DISCONTINUED | OUTPATIENT
Start: 2025-02-24 | End: 2025-02-24 | Stop reason: HOSPADM

## 2025-02-24 RX ORDER — ONDANSETRON 2 MG/ML
INJECTION INTRAMUSCULAR; INTRAVENOUS AS NEEDED
Status: DISCONTINUED | OUTPATIENT
Start: 2025-02-24 | End: 2025-02-24 | Stop reason: SURG

## 2025-02-24 RX ORDER — LABETALOL HYDROCHLORIDE 5 MG/ML
5 INJECTION, SOLUTION INTRAVENOUS
Status: DISCONTINUED | OUTPATIENT
Start: 2025-02-24 | End: 2025-02-24 | Stop reason: HOSPADM

## 2025-02-24 RX ORDER — METOPROLOL TARTRATE 25 MG/1
25 TABLET, FILM COATED ORAL ONCE
Status: COMPLETED | OUTPATIENT
Start: 2025-02-24 | End: 2025-02-24

## 2025-02-24 RX ORDER — FLUMAZENIL 0.1 MG/ML
0.2 INJECTION INTRAVENOUS AS NEEDED
Status: DISCONTINUED | OUTPATIENT
Start: 2025-02-24 | End: 2025-02-24 | Stop reason: HOSPADM

## 2025-02-24 RX ORDER — NALOXONE HCL 0.4 MG/ML
0.4 VIAL (ML) INJECTION AS NEEDED
Status: DISCONTINUED | OUTPATIENT
Start: 2025-02-24 | End: 2025-02-24 | Stop reason: HOSPADM

## 2025-02-24 RX ORDER — DEXAMETHASONE SODIUM PHOSPHATE 4 MG/ML
INJECTION, SOLUTION INTRA-ARTICULAR; INTRALESIONAL; INTRAMUSCULAR; INTRAVENOUS; SOFT TISSUE AS NEEDED
Status: DISCONTINUED | OUTPATIENT
Start: 2025-02-24 | End: 2025-02-24 | Stop reason: SURG

## 2025-02-24 RX ORDER — PROPOFOL 10 MG/ML
VIAL (ML) INTRAVENOUS AS NEEDED
Status: DISCONTINUED | OUTPATIENT
Start: 2025-02-24 | End: 2025-02-24 | Stop reason: SURG

## 2025-02-24 RX ORDER — HYDRALAZINE HYDROCHLORIDE 20 MG/ML
5 INJECTION INTRAMUSCULAR; INTRAVENOUS
Status: DISCONTINUED | OUTPATIENT
Start: 2025-02-24 | End: 2025-02-24 | Stop reason: HOSPADM

## 2025-02-24 RX ORDER — ROCURONIUM BROMIDE 10 MG/ML
INJECTION, SOLUTION INTRAVENOUS AS NEEDED
Status: DISCONTINUED | OUTPATIENT
Start: 2025-02-24 | End: 2025-02-24 | Stop reason: SURG

## 2025-02-24 RX ORDER — SODIUM CHLORIDE 9 MG/ML
INJECTION, SOLUTION INTRAVENOUS CONTINUOUS PRN
Status: DISCONTINUED | OUTPATIENT
Start: 2025-02-24 | End: 2025-02-24 | Stop reason: SURG

## 2025-02-24 RX ORDER — GLYCOPYRROLATE 0.2 MG/ML
INJECTION INTRAMUSCULAR; INTRAVENOUS AS NEEDED
Status: DISCONTINUED | OUTPATIENT
Start: 2025-02-24 | End: 2025-02-24 | Stop reason: SURG

## 2025-02-24 RX ORDER — PHENYLEPHRINE HCL IN 0.9% NACL 1 MG/10 ML
SYRINGE (ML) INTRAVENOUS AS NEEDED
Status: DISCONTINUED | OUTPATIENT
Start: 2025-02-24 | End: 2025-02-24 | Stop reason: SURG

## 2025-02-24 RX ORDER — DIPHENHYDRAMINE HYDROCHLORIDE 50 MG/ML
12.5 INJECTION INTRAMUSCULAR; INTRAVENOUS ONCE AS NEEDED
Status: DISCONTINUED | OUTPATIENT
Start: 2025-02-24 | End: 2025-02-24 | Stop reason: HOSPADM

## 2025-02-24 RX ORDER — OXYCODONE HYDROCHLORIDE 5 MG/1
10 TABLET ORAL EVERY 4 HOURS PRN
Status: DISCONTINUED | OUTPATIENT
Start: 2025-02-24 | End: 2025-02-24 | Stop reason: HOSPADM

## 2025-02-24 RX ORDER — IPRATROPIUM BROMIDE AND ALBUTEROL SULFATE 2.5; .5 MG/3ML; MG/3ML
3 SOLUTION RESPIRATORY (INHALATION) ONCE AS NEEDED
Status: DISCONTINUED | OUTPATIENT
Start: 2025-02-24 | End: 2025-02-24 | Stop reason: HOSPADM

## 2025-02-24 RX ORDER — HEPARIN SODIUM 1000 [USP'U]/ML
INJECTION, SOLUTION INTRAVENOUS; SUBCUTANEOUS AS NEEDED
Status: DISCONTINUED | OUTPATIENT
Start: 2025-02-24 | End: 2025-02-24 | Stop reason: SURG

## 2025-02-24 RX ADMIN — HEPARIN SODIUM 2000 UNITS: 1000 INJECTION, SOLUTION INTRAVENOUS; SUBCUTANEOUS at 12:53

## 2025-02-24 RX ADMIN — PROPOFOL 200 MG: 10 INJECTION, EMULSION INTRAVENOUS at 12:14

## 2025-02-24 RX ADMIN — ONDANSETRON 4 MG: 2 INJECTION INTRAMUSCULAR; INTRAVENOUS at 13:44

## 2025-02-24 RX ADMIN — Medication 100 MCG: at 12:53

## 2025-02-24 RX ADMIN — METOPROLOL TARTRATE 25 MG: 25 TABLET, FILM COATED ORAL at 18:54

## 2025-02-24 RX ADMIN — ROCURONIUM BROMIDE 30 MG: 10 INJECTION, SOLUTION INTRAVENOUS at 12:56

## 2025-02-24 RX ADMIN — SODIUM CHLORIDE: 9 INJECTION, SOLUTION INTRAVENOUS at 12:04

## 2025-02-24 RX ADMIN — HEPARIN SODIUM 9500 UNITS: 1000 INJECTION, SOLUTION INTRAVENOUS; SUBCUTANEOUS at 12:30

## 2025-02-24 RX ADMIN — ROCURONIUM BROMIDE 50 MG: 10 INJECTION, SOLUTION INTRAVENOUS at 12:14

## 2025-02-24 RX ADMIN — HEPARIN SODIUM 2000 UNITS: 1000 INJECTION, SOLUTION INTRAVENOUS; SUBCUTANEOUS at 13:33

## 2025-02-24 RX ADMIN — Medication 100 MCG: at 12:47

## 2025-02-24 RX ADMIN — GLYCOPYRROLATE 0.2 MCG: 0.2 INJECTION INTRAMUSCULAR; INTRAVENOUS at 12:41

## 2025-02-24 RX ADMIN — ROCURONIUM BROMIDE 10 MG: 10 INJECTION, SOLUTION INTRAVENOUS at 13:29

## 2025-02-24 RX ADMIN — Medication 100 MCG: at 12:39

## 2025-02-24 RX ADMIN — PROTAMINE SULFATE 50 MG: 10 INJECTION, SOLUTION INTRAVENOUS at 13:58

## 2025-02-24 RX ADMIN — DEXAMETHASONE SODIUM PHOSPHATE 4 MG: 4 INJECTION, SOLUTION INTRAMUSCULAR; INTRAVENOUS at 12:29

## 2025-02-24 RX ADMIN — Medication 100 MCG: at 13:11

## 2025-02-24 RX ADMIN — PHENYLEPHRINE HYDROCHLORIDE 0.5 MCG/KG/MIN: 10 INJECTION INTRAVENOUS at 13:04

## 2025-02-24 RX ADMIN — SUGAMMADEX 200 MG: 100 INJECTION, SOLUTION INTRAVENOUS at 14:01

## 2025-02-24 RX ADMIN — HEPARIN SODIUM 18 UNITS/KG/HR: 10000 INJECTION, SOLUTION INTRAVENOUS at 12:32

## 2025-02-24 NOTE — DISCHARGE INSTRUCTIONS
[facility]  Cardiology  [facility] [place] [address]  [phone]    Ablation After Care    Refer to this sheet in the next few weeks. These instructions provide you with information on caring for yourself after your procedure. Your health care provider may also give you more specific instructions. Your treatment has been planned according to current medical practices, but problems sometimes occur. Call your health care provider if you have any problems or questions after your procedure.      What to Expect After the Procedure:  After your procedure, it is typical to have the following sensations:  Minor discomfort or tenderness and a small bump at the catheter insertion site(s). The bump(s) should usually decrease in size and tenderness within 1 to 2 weeks.  You may take over the counter Tylenol (acetaminophen) for pain control. If you experience severe pain, this is not expected and should be reported to your physician immediately.  Any bruising will usually fade within 2 to 4 weeks.  Home Care Instructions:  Do not apply powder or lotion to the site.  Do not take baths, swim, or use a hot tub until your health care provider approves and the site is completely healed.  Do not bend, squat, or lift anything over 20 lb (9 kg) or as directed by your health care provider. However, we recommend lifting nothing heavier than a gallon of milk.    You may shower 24 hours after the procedure. Remove the bandage (dressing) and gently wash the site with plain soap and water. Gently pat the site dry. You may apply a band aid daily for 2 days if desired.    Inspect the site at least twice daily.  Limit your activity for the first 48 hours.   Avoid strenuous activity for 1 week or as advised by your physician.    Follow instructions about when you can drive or return to work as directed by your physician.    Hold direct pressure over the site when you cough, sneeze, laugh or change positions.  Do this for  the next 2 days.    Call Your Doctor If:  You have drainage (other than a small amount of blood on the dressing).  You have chills or a fever > 101.  You have redness, warmth, swelling (larger than a walnut), or pain at the insertion   You develop palpitations, chest pain or shortness of breath, feel faint, or pass out.  You develop pain, discoloration, coldness, numbness, tingling, or severe bruising in the leg that held the catheter.  You develop bleeding from any other place, such as the bowels.  You have heavy bleeding from the site. If this happens, lie down on a flat surface, such as a bed or couch, and hold pressure on the site for 20 minutes. If the bleeding stops, apply a fresh bandage and continue to lie down for one hour and notify your physician. If the bleeding continues, keep holding pressure at the site and call 911.

## 2025-02-24 NOTE — ANESTHESIA POSTPROCEDURE EVALUATION
Patient: Filipe Altamirano    Procedure Summary       Date: 02/24/25 Room / Location: [facility] /  [facility]    Anesthesia Start: 1204 Anesthesia Stop: 1417    Procedure: Ablation atrial fibrillation, pfa - REPS EMAILED Diagnosis:       Paroxysmal atrial fibrillation      (AFIB)    Providers: Robert Brown MD Provider: Taniya Coon CRNA    Anesthesia Type: general ASA Status: 3            Anesthesia Type: general    Vitals  Vitals Value Taken Time   /71 02/24/25 1516   Temp 97.8 °F (36.6 °C) 02/24/25 1429   Pulse 62 02/24/25 1518   Resp 19 02/24/25 1429   SpO2 99 % 02/24/25 1518   Vitals shown include unfiled device data.        Post Anesthesia Care and Evaluation    Patient location during evaluation: PACU  Patient participation: complete - patient participated  Level of consciousness: awake  Pain scale: See nurse's notes for pain score.  Pain management: adequate    Airway patency: patent  Anesthetic complications: No anesthetic complications  PONV Status: none  Cardiovascular status: acceptable  Respiratory status: acceptable and spontaneous ventilation  Hydration status: acceptable    Comments: Patient seen and examined postoperatively; vital signs stable; SpO2 greater than or equal to 90%; cardiopulmonary status stable; nausea/vomiting adequately controlled; pain adequately controlled; no apparent anesthesia complications; patient discharged from anesthesia care when discharge criteria were met

## 2025-02-24 NOTE — ANESTHESIA PROCEDURE NOTES
Airway  Urgency: elective    Date/Time: 2/24/2025 12:16 PM  Airway not difficult    General Information and Staff    Patient location during procedure: OR    Indications and Patient Condition  Indications for airway management: airway protection    Preoxygenated: yes  Mask difficulty assessment: 1 - vent by mask    Final Airway Details  Final airway type: endotracheal airway      Successful airway: ETT  Cuffed: yes   Successful intubation technique: video laryngoscopy  Endotracheal tube insertion site: oral  Blade: Traore  Blade size: 3  ETT size (mm): 7.5  Cormack-Lehane Classification: grade I - full view of glottis  Placement verified by: chest auscultation and bronchoscopy   Cuff volume (mL): 8  Measured from: gums  ETT/EBT to gums (cm): 22  Number of attempts at approach: 1  Assessment: lips, teeth, and gum same as pre-op and atraumatic intubation

## 2025-02-24 NOTE — H&P
History of present illness  Filipe Swan a 74-year-old male patient who has paroxysmal atrial fibrillation initially diagnosed in , here today for follow-up. During previous visits, flecainide was called in but patient hesitated due to potential side effects, but then he did take 200 mg during an episode which terminated it.  Patient is here today to discuss about ablation.     Medical History        Past Medical History:   Diagnosis Date    Atrial fibrillation      Disease of thyroid gland      Hard of hearing      Hyperlipidemia      Hypertension      PONV (postoperative nausea and vomiting)      Vertigo           Surgical History         Past Surgical History:   Procedure Laterality Date    ABDOMINAL SURGERY        APPENDECTOMY        CATARACT EXTRACTION Right      COLON SURGERY        HERNIA REPAIR                   Family History   Problem Relation Age of Onset    No Known Problems Mother      No Known Problems Father      Arrhythmia Brother        Social History   Social History            Tobacco Use    Smoking status: Former       Current packs/day: 0.00       Types: Cigarettes       Start date: 6/15/1969       Quit date: 6/15/1979       Years since quittin.6       Passive exposure: Never    Smokeless tobacco: Never    Tobacco comments:       While in the [employer]   Vaping Use    Vaping status: Never Used   Substance Use Topics    Alcohol use: Yes       Alcohol/week: 1.0 standard drink of alcohol       Types: 1 Glasses of wine per week       Comment: occasionally    Drug use: No           Current Medications      Current Outpatient Medications:     Cholecalciferol (Vitamin D3) 50 MCG (2000 UT) capsule, Take 1 capsule by mouth Daily., Disp: , Rfl:     flecainide (TAMBOCOR) 100 MG tablet, TAKE 2 TABLETS BY MOUTH DAILY, Disp: 20 tablet, Rfl: 6    levothyroxine (SYNTHROID, LEVOTHROID) 100 MCG tablet, Take 1 tablet by mouth Daily., Disp: , Rfl:     losartan (COZAAR) 25 MG tablet, Take 1 tablet by mouth  Daily., Disp: 90 tablet, Rfl: 2    methylPREDNISolone (MEDROL) 4 MG dose pack, Take as directed on package instructions., Disp: 21 tablet, Rfl: 0    metoprolol tartrate (LOPRESSOR) 25 MG tablet, TAKE ONE TABLET BY MOUTH THREE TIMES A DAY, Disp: 270 tablet, Rfl: 1    rosuvastatin (CRESTOR) 5 MG tablet, Take 1 tablet by mouth Daily., Disp: , Rfl:     warfarin (COUMADIN) 5 MG tablet, TAKE 1 AND 1/2 TABLET BY MOUTH DAILY EXCEPT TAKE ONE TABLET BY MOUTH ON MONDAYS, WEDNESDAYS, AND FRIDAYS OR AS DIRECTED, Disp: 115 tablet, Rfl: 0     Allergies:  Codeine        Physical Exam  VITALS REVIEWED     General:      well developed, in no acute distress.    Head:      normocephalic and atraumatic.    Eyes:      PERRL/EOM intact, conjunctiva and sclera clear with out nystagmus.    Neck:      no masses, thyromegaly,  trachea central with normal respiratory effort and PMI displaced laterally  Lungs:      Clear to auscultation bilaterally  Heart:       Regular rate and rhythm  Msk:      no deformity or scoliosis noted of thoracic or lumbar spine.    Pulses:      pulses normal in all 4 extremities.    Extremities:       No lower extremity edema  Neurologic:      no focal deficits.   alert oriented x3  Skin:      intact without lesions or rashes.    Psych:      alert and cooperative; normal mood and affect; normal attention span and concentration.          Result Review  :                    Pertinent cardiac workup     Echo on 3/17/2020 ejection fraction 55% mild to moderate aortic regurgitation  Telemetry strips from 3/5/2020 (first trip) atrial flutter  EKG 12/7/2024 atrial fibrillation        Procedures         Assessment  Assessment and Plan       Filipe Altamirano is a 74-year-old male patient who has paroxysmal atrial fibrillation since 2020.  The patient has multiple recurrences of arrhythmia.  He was recently given flecainide to use as needed and he took 200 mg during an episode which terminated the arrhythmia.  Due to multiple  recurrences however patient would like to have an ablation done, risk indications and benefits of A-fib and in his case atrial flutter ablation were discussed at length and he is agreeable.  Continue same medications until then.     Diagnoses and all orders for this visit:     1. Atrial flutter with rapid ventricular response (Primary)     2. Paroxysmal atrial fibrillation     3. Essential hypertension              No follow-ups on file.  Patient was given instructions and counseling regarding his condition or for health maintenance advice. Please see specific information pulled into the AVS if appropriate.         Electronically signed by Robert Brown MD, 01/22/25, 4:45 PM EST.

## 2025-03-03 ENCOUNTER — ANTICOAGULATION VISIT (OUTPATIENT)
Dept: CARDIOLOGY | Facility: CLINIC | Age: 75
End: 2025-03-03
Payer: MEDICARE

## 2025-03-03 VITALS
DIASTOLIC BLOOD PRESSURE: 68 MMHG | SYSTOLIC BLOOD PRESSURE: 127 MMHG | HEART RATE: 57 BPM | BODY MASS INDEX: 33.25 KG/M2 | WEIGHT: 259 LBS

## 2025-03-03 DIAGNOSIS — Z79.01 LONG TERM (CURRENT) USE OF ANTICOAGULANTS: ICD-10-CM

## 2025-03-03 DIAGNOSIS — I48.0 PAROXYSMAL ATRIAL FIBRILLATION: Primary | ICD-10-CM

## 2025-03-03 LAB — INR PPP: 2.2 (ref 0.9–1.1)

## 2025-03-03 PROCEDURE — 85610 PROTHROMBIN TIME: CPT | Performed by: INTERNAL MEDICINE

## 2025-03-03 PROCEDURE — 36416 COLLJ CAPILLARY BLOOD SPEC: CPT | Performed by: INTERNAL MEDICINE

## 2025-03-06 ENCOUNTER — TELEPHONE (OUTPATIENT)
Dept: CARDIOLOGY | Facility: CLINIC | Age: 75
End: 2025-03-06
Payer: MEDICARE

## 2025-03-06 NOTE — TELEPHONE ENCOUNTER
"Caller: Filipe Altamirano \"Arslan\"    Relationship to patient: Self    Best call back number: [phone]    Patient is needing: PT HAD ABLATION ON 02.24.2025. EVERYTHING WAS FINE BUT PT NOTICED A COUPLE DAYS AGO THAT HE HAD A KNOT OVER THE ABLATION SPOT AND SEEMS TO BE GETTING LARGER. NO FEVER, NO REDNESS, NOT WARM TO THE TOUCH. PLEASE CALL TO ADVISE.            "

## 2025-03-06 NOTE — TELEPHONE ENCOUNTER
Called and spoke to patient. Recommended that he put pressure on site for tonight and see if that helps reduce knot. If no change by the morning, may need ultrasound. Patient v/u.

## 2025-03-25 ENCOUNTER — OFFICE VISIT (OUTPATIENT)
Dept: CARDIOLOGY | Facility: CLINIC | Age: 75
End: 2025-03-25
Payer: MEDICARE

## 2025-03-25 VITALS
HEART RATE: 60 BPM | HEIGHT: 74 IN | DIASTOLIC BLOOD PRESSURE: 75 MMHG | BODY MASS INDEX: 32.6 KG/M2 | OXYGEN SATURATION: 95 % | WEIGHT: 254 LBS | SYSTOLIC BLOOD PRESSURE: 147 MMHG

## 2025-03-25 DIAGNOSIS — I48.92 ATRIAL FLUTTER WITH RAPID VENTRICULAR RESPONSE: Primary | ICD-10-CM

## 2025-03-25 DIAGNOSIS — I10 ESSENTIAL HYPERTENSION: Chronic | ICD-10-CM

## 2025-03-25 DIAGNOSIS — I48.0 PAROXYSMAL ATRIAL FIBRILLATION: ICD-10-CM

## 2025-03-25 RX ORDER — ASPIRIN 81 MG/1
81 TABLET ORAL DAILY
Start: 2025-03-25

## 2025-03-25 RX ORDER — METOPROLOL SUCCINATE 25 MG/1
25 TABLET, EXTENDED RELEASE ORAL DAILY
Qty: 90 TABLET | Refills: 3 | Status: SHIPPED | OUTPATIENT
Start: 2025-03-25

## 2025-03-25 NOTE — PROGRESS NOTES
Progress note      Name: Filipe Altamirano ADMIT: (Not on file)   : 1950  PCP: Frantz Saleem MD    MRN: [id number] LOS: 0 days   AGE/SEX: 75 y.o. male  ROOM: Room/bed info not found     Chief Complaint   Patient presents with    Follow-up     30 day post ablation       Subjective       History of present illness  Filipe Altamirano is a 75-year-old male patient who has history of paroxysmal atrial fibrillation initially diagnosed in .  His episodes had started to become more frequent and we had given him flecainide to take as needed.  Patient eventually had ablation with PVI and flutter line on 3/24/2025 and is here today for follow-up.  He has not had any A-fib since then he is feeling very well.    Past Medical History:   Diagnosis Date    Atrial fibrillation     Disease of thyroid gland     Hard of hearing     Hyperlipidemia     Hypertension     PONV (postoperative nausea and vomiting)     Vertigo      Past Surgical History:   Procedure Laterality Date    ABDOMINAL SURGERY      APPENDECTOMY      CARDIAC ELECTROPHYSIOLOGY PROCEDURE N/A 2025    Procedure: Ablation atrial fibrillation, pfa - REPS EMAILED;  Surgeon: Robert Brown MD;  Location: [facility] INVASIVE LOCATION;  Service: Cardiovascular;  Laterality: N/A;    CATARACT EXTRACTION Right     COLON SURGERY      HERNIA REPAIR       Family History   Problem Relation Age of Onset    No Known Problems Mother     No Known Problems Father     Arrhythmia Brother      Social History     Tobacco Use    Smoking status: Former     Current packs/day: 0.00     Types: Cigarettes     Start date: 6/15/1969     Quit date: 6/15/1979     Years since quittin.8     Passive exposure: Never    Smokeless tobacco: Never    Tobacco comments:     While in [employer]   Vaping Use    Vaping status: Never Used   Substance Use Topics    Alcohol use: Yes     Alcohol/week: 1.0 standard drink of alcohol     Types: 1 Glasses of wine per week     Comment: occasionally     Drug use: No       Current Outpatient Medications:     Cholecalciferol (Vitamin D3) 50 MCG (2000 UT) capsule, Take 1 capsule by mouth Daily., Disp: , Rfl:     levothyroxine (SYNTHROID, LEVOTHROID) 100 MCG tablet, Take 1 tablet by mouth Daily., Disp: , Rfl:     losartan (COZAAR) 25 MG tablet, Take 1 tablet by mouth Daily., Disp: 90 tablet, Rfl: 2    rosuvastatin (CRESTOR) 5 MG tablet, Take 1 tablet by mouth Daily., Disp: , Rfl:     aspirin 81 MG EC tablet, Take 1 tablet by mouth Daily., Disp: , Rfl:     flecainide (TAMBOCOR) 100 MG tablet, TAKE 2 TABLETS BY MOUTH DAILY (Patient taking differently: Take 2 tablets by mouth Daily. Only taking when needed), Disp: 20 tablet, Rfl: 6    methylPREDNISolone (MEDROL) 4 MG dose pack, Take as directed on package instructions. (Patient not taking: Reported on 3/25/2025), Disp: 21 tablet, Rfl: 0    metoprolol succinate XL (TOPROL-XL) 25 MG 24 hr tablet, Take 1 tablet by mouth Daily., Disp: 90 tablet, Rfl: 3  Allergies:  Codeine      Physical Exam  VITALS REVIEWED    General:      well developed, in no acute distress.    Head:      normocephalic and atraumatic.    Eyes:      PERRL/EOM intact, conjunctiva and sclera clear with out nystagmus.    Neck:      no masses, thyromegaly,  trachea central with normal respiratory effort and PMI displaced laterally  Lungs:      Clear to auscultation bilaterally  Heart:       Regular rate and rhythm  Msk:      no deformity or scoliosis noted of thoracic or lumbar spine.    Pulses:      pulses normal in all 4 extremities.    Extremities:       No lower extremity edema  Neurologic:      no focal deficits.   alert oriented x3  Skin:      intact without lesions or rashes.    Psych:      alert and cooperative; normal mood and affect; normal attention span and concentration.      Result Review :               Pertinent cardiac workup    Echo on 3/17/2020 ejection fraction 55% mild to moderate aortic regurgitation  Telemetry strips from 3/5/2020 (first  trip) atrial flutter  EKG 12/7/2024 atrial fibrillation      Procedures        Assessment and Plan      Filipe Altamirano is a 75-year-old male patient was history of paroxysmal atrial fibrillation since 2020.  He has had multiple recurrences of arrhythmia.  He was given flecainide 300 mg to take on as-needed basis but his episodes became more more frequent and he had ablation with PVI and flutter line on 3/24/2025.  Since then has been in sinus rhythm.  He has an Apple Watch and he monitors his EKGs.  Today's EKG on his watch also showed sinus rhythm.  Will switch Toprol to 25 mg once a day.  Patient would like to come off warfarin.  I think that is okay, advised him to take baby aspirin instead.  Otherwise follow-up in 6 months.      Diagnoses and all orders for this visit:    1. Atrial flutter with rapid ventricular response (Primary)    2. Paroxysmal atrial fibrillation    3. Essential hypertension    Other orders  -     metoprolol succinate XL (TOPROL-XL) 25 MG 24 hr tablet; Take 1 tablet by mouth Daily.  Dispense: 90 tablet; Refill: 3  -     aspirin 81 MG EC tablet; Take 1 tablet by mouth Daily.           Return in about 6 months (around 9/25/2025).  Patient was given instructions and counseling regarding his condition or for health maintenance advice. Please see specific information pulled into the AVS if appropriate.       Electronically signed by Robert Brown MD, 03/25/25, 9:17 AM EDT.

## 2025-06-18 DIAGNOSIS — I48.0 PAROXYSMAL ATRIAL FIBRILLATION: ICD-10-CM

## 2025-06-18 RX ORDER — METOPROLOL SUCCINATE 25 MG/1
25 TABLET, EXTENDED RELEASE ORAL DAILY
Qty: 90 TABLET | Refills: 3 | Status: SHIPPED | OUTPATIENT
Start: 2025-06-18 | End: 2025-06-19 | Stop reason: SDUPTHER

## 2025-06-18 RX ORDER — METOPROLOL TARTRATE 25 MG/1
25 TABLET, FILM COATED ORAL 3 TIMES DAILY
Qty: 270 TABLET | Refills: 1 | OUTPATIENT
Start: 2025-06-18

## 2025-06-18 NOTE — TELEPHONE ENCOUNTER
Rx Refill Note  Requested Prescriptions     Signed Prescriptions Disp Refills    metoprolol succinate XL (TOPROL-XL) 25 MG 24 hr tablet 90 tablet 3     Sig: Take 1 tablet by mouth Daily.     Authorizing Provider: DOM CHATMAN     Ordering User: MARÍA HERNANDEZ     Refused Prescriptions Disp Refills    metoprolol tartrate (LOPRESSOR) 25 MG tablet [Pharmacy Med Name: METOPROLOL TARTRATE 25 MG TAB] 270 tablet 1     Sig: TAKE 1 TABLET BY MOUTH 3 TIMES A DAY     Refused By: MARÍA HERNANDEZ     Reason for Refusal: Other      Last office visit with prescribing clinician: 3/25/2025   Last telemedicine visit with prescribing clinician: Visit date not found   Next office visit with prescribing clinician: 9/30/2025                         Would you like a call back once the refill request has been completed: [] Yes [] No    If the office needs to give you a call back, can they leave a voicemail: [] Yes [] No    María Hernandez MA  06/18/25, 10:40 EDT

## 2025-06-19 ENCOUNTER — TELEPHONE (OUTPATIENT)
Dept: CARDIOLOGY | Facility: CLINIC | Age: 75
End: 2025-06-19
Payer: MEDICARE

## 2025-06-19 DIAGNOSIS — I48.0 PAROXYSMAL ATRIAL FIBRILLATION: ICD-10-CM

## 2025-06-19 RX ORDER — METOPROLOL SUCCINATE 25 MG/1
25 TABLET, EXTENDED RELEASE ORAL DAILY
Qty: 90 TABLET | Refills: 3 | Status: SHIPPED | OUTPATIENT
Start: 2025-06-19

## 2025-06-19 NOTE — TELEPHONE ENCOUNTER
"Caller: Filipe Altamirano \"Arslan\"    Relationship to patient: Self    Best call back number: [phone]    Patient is needing: PT STATES THEY WENT TO THEIR [facility] TO GET THEIR METOPROLOL SUCCINATE AND WERE TOLD THEY COULDN'T GET IT UNTIL AUGUST. PT STATES THEIR METOPROLOL WAS CHANGED TO THE SUCCINATE KIND BY DR CHATMAN. WERE TOLD IF THEY HAD ISSUES GETTING THEIR MEDICATION TO REACH BACK OUT. PT STATES THEY HAVE TWO TABLETS LEFT. PLEASE CALL TO ADVISE.    PHONE NUMBER FOR PHARMACY IS [phone].  "

## 2025-06-19 NOTE — TELEPHONE ENCOUNTER
Rx Refill Note  Requested Prescriptions     Signed Prescriptions Disp Refills    metoprolol succinate XL (TOPROL-XL) 25 MG 24 hr tablet 90 tablet 3     Sig: Take 1 tablet by mouth Daily.     Authorizing Provider: DOM CHATMAN     Ordering User: MARÍA HERNANDEZ      Last office visit with prescribing clinician: 3/25/2025   Last telemedicine visit with prescribing clinician: Visit date not found   Next office visit with prescribing clinician: 9/30/2025                         Would you like a call back once the refill request has been completed: [] Yes [] No    If the office needs to give you a call back, can they leave a voicemail: [] Yes [] No    María Hernandez MA  06/19/25, 14:10 EDT

## (undated) DEVICE — CATHETER CONNECTION CABLE: Brand: FARASTAR™

## (undated) DEVICE — NDL HYPO PRECISIONGLIDE/REG 18G 1IN PNK

## (undated) DEVICE — 1 X VERSACROSS CONNECT TRANSSEPTAL DILATOR;  1 X VERSACROSS RF WIRE (INCLUDING 1 X CONNECTOR CABLE (SINGLE USE)): Brand: VERSACROSS CONNECT ACCESS SOLUTION FOR FARADRIVE

## (undated) DEVICE — GW XCHG AMPLTZ XSTIF PTFE CRV .035IN 3X180CM

## (undated) DEVICE — INTRO STEER AGILIS NXT MED/CURL 8.5F

## (undated) DEVICE — NDL HYPO PRECISIONGLIDE REG 25G 1 1/2

## (undated) DEVICE — SHLD EYE ALUM W/GARTER

## (undated) DEVICE — SOLUTION,WATER,IRRIGATION,1000ML,STERILE: Brand: MEDLINE

## (undated) DEVICE — PULSED FIELD ABLATION CATHETER: Brand: FARAWAVE™

## (undated) DEVICE — BLANKT WARM UNDER/BDY FUL/ACC A/ 90X206CM

## (undated) DEVICE — OCTA,PERSEID,2-2-2-2-2,D-CURVE: Brand: OCTARAY MAPPING CATHETER

## (undated) DEVICE — PROVE COVER: Brand: UNBRANDED

## (undated) DEVICE — GLV SURG SENSICARE SLT PF LF 7.5 STRL

## (undated) DEVICE — Device

## (undated) DEVICE — PAD E/S GRND SGL/FOIL 9FT/CORD DISP

## (undated) DEVICE — STEERABLE SHEATH CLEAR: Brand: FARADRIVE™

## (undated) DEVICE — TBG IV DRIP CHAMBER MACRO SGL 72IN

## (undated) DEVICE — INTRO PERFORMER CHECKFLO/LG RAD/BND NO/GW 18F .038IN 30CM

## (undated) DEVICE — INTERFACE CABLE: Brand: CARTO 3 SYSTEM ECO INTERFACE CABLE

## (undated) DEVICE — ELECTRD DEFIB M/FUNC PROPADZ RADIOL 2PK

## (undated) DEVICE — TX ECO EXT CABLE: Brand: TX ECO EXT CABLE

## (undated) DEVICE — PINNACLE INTRODUCER SHEATH: Brand: PINNACLE

## (undated) DEVICE — PK VITRECT EVA VGPC 25G

## (undated) DEVICE — PK VITRECT 50

## (undated) DEVICE — LN INJ CONTRST FLXCIL HP F/M LL 1200PSI72

## (undated) DEVICE — SYR LL TP 10ML STRL

## (undated) DEVICE — KT SURG TURNOVER 050

## (undated) DEVICE — Device: Brand: REFERENCE PATCH CARTO 3

## (undated) DEVICE — Device: Brand: SMARTABLATE

## (undated) DEVICE — SOUNDSTAR ECO 8F G CATHETER: Brand: SOUNDSTAR

## (undated) DEVICE — CVR HNDL LT SURG ACCSSRY BLU STRL

## (undated) DEVICE — PK TRY HEART CATH 50

## (undated) DEVICE — Device: Brand: WEBSTER CS

## (undated) DEVICE — BI-DIRECTIONAL NAVIGATION CATHETER, NAV, D-F: Brand: QDOT MICRO